# Patient Record
Sex: FEMALE | Race: WHITE | Employment: PART TIME | URBAN - NONMETROPOLITAN AREA
[De-identification: names, ages, dates, MRNs, and addresses within clinical notes are randomized per-mention and may not be internally consistent; named-entity substitution may affect disease eponyms.]

---

## 2022-09-20 ENCOUNTER — OFFICE VISIT (OUTPATIENT)
Dept: PODIATRY | Age: 62
End: 2022-09-20
Payer: COMMERCIAL

## 2022-09-20 VITALS
BODY MASS INDEX: 32.27 KG/M2 | HEART RATE: 62 BPM | HEIGHT: 64 IN | WEIGHT: 189 LBS | SYSTOLIC BLOOD PRESSURE: 122 MMHG | DIASTOLIC BLOOD PRESSURE: 80 MMHG

## 2022-09-20 DIAGNOSIS — L60.8 DEFORMITY OF NAIL BED: ICD-10-CM

## 2022-09-20 DIAGNOSIS — M79.671 FOOT PAIN, BILATERAL: ICD-10-CM

## 2022-09-20 DIAGNOSIS — L84 CORNS AND CALLOSITIES: Primary | ICD-10-CM

## 2022-09-20 DIAGNOSIS — M79.672 FOOT PAIN, BILATERAL: ICD-10-CM

## 2022-09-20 PROCEDURE — 99203 OFFICE O/P NEW LOW 30 MIN: CPT | Performed by: PODIATRIST

## 2022-09-20 PROCEDURE — 11055 PARING/CUTG B9 HYPRKER LES 1: CPT | Performed by: PODIATRIST

## 2022-09-20 RX ORDER — EZETIMIBE 10 MG/1
TABLET ORAL
COMMUNITY
Start: 2022-09-01

## 2022-09-20 RX ORDER — LOSARTAN POTASSIUM 50 MG/1
TABLET ORAL
COMMUNITY
Start: 2022-07-16

## 2022-09-20 RX ORDER — PRAVASTATIN SODIUM 80 MG/1
TABLET ORAL
COMMUNITY
Start: 2022-07-16

## 2022-09-20 RX ORDER — ESCITALOPRAM OXALATE 10 MG/1
TABLET ORAL
COMMUNITY
Start: 2019-11-12

## 2022-09-20 NOTE — PROGRESS NOTES
Subjective:  Morteza Tate is a 65 y.o. female who presents to the office today complaining of a painful callous on the Bilateral foot . Symptoms began  year(s) ago. Patient relates pain is Present. Pain is rated 4 out of 10 and is described as waxing and waning. Treatments prior to today's visit include: none recent. Pt also has changes to L big toe nail. Injured it awhile back and nail has always been thicker. Cut it back and felt a lot better. No other tx tried. .  Currently denies F/C/N/V. Allergies   Allergen Reactions    Amoxicillin Er Itching     Makes blood pressure drop       Past Medical History:   Diagnosis Date    Hyperlipidemia     Hypertension     Hypothyroidism        Prior to Admission medications    Medication Sig Start Date End Date Taking? Authorizing Provider   losartan (COZAAR) 50 MG tablet TAKE 1 TABLET BY MOUTH ONCE DAILY 7/16/22  Yes Historical Provider, MD   pravastatin (PRAVACHOL) 80 MG tablet TAKE 1 TABLET BY MOUTH ONCE DAILY 7/16/22  Yes Historical Provider, MD   ezetimibe (ZETIA) 10 MG tablet TAKE 1 TABLET BY MOUTH ONCE DAILY 9/1/22  Yes Historical Provider, MD   escitalopram (LEXAPRO) 10 MG tablet Take 1 tablet by mouth once daily 11/12/19  Yes Historical Provider, MD   ibuprofen (ADVIL;MOTRIN) 800 MG tablet Take 1 tablet by mouth every 8 hours as needed for Pain 6/29/16  Yes Jean-Paul Barbosa DO   docusate sodium (COLACE, DULCOLAX) 100 MG CAPS Take 100 mg by mouth 2 times daily 6/29/16  Yes Jean-Paul Barbosa DO   calcium carbonate (OSCAL) 500 MG TABS tablet Take 500 mg by mouth daily. Yes Historical Provider, MD   levothyroxine (SYNTHROID) 200 MCG tablet Take 200 mcg by mouth Daily. Yes Historical Provider, MD   Multiple Vitamin (MULTI-VITAMIN DAILY PO) Take  by mouth.    Yes Historical Provider, MD       Past Surgical History:   Procedure Laterality Date    DILATION AND CURETTAGE OF UTERUS      January, 1995 following miscarriage    HYSTEROSCOPY      ROBOTIC ASSISTED HYSTERECTOMY      THYROIDECTOMY      24years old       Family History   Problem Relation Age of Onset    Cancer Father         Esophageal    Diabetes Father     Glaucoma Neg Hx        Social History     Tobacco Use    Smoking status: Every Day     Packs/day: 1.00     Years: 35.00     Pack years: 35.00     Types: Cigarettes    Smokeless tobacco: Never    Tobacco comments:     LI Barrera RRT 6/28/16   Substance Use Topics    Alcohol use: No     Alcohol/week: 0.0 standard drinks       ROS: All 14 ROS systems reviewed and pertinent positives noted above, all others negative. Lower Extremity Physical Examination:     Vitals:   Vitals:    09/20/22 0952   BP: 122/80   Pulse: 62     General: AAO x 3 in NAD. Vascular: DP and PT pulses palpable 2/4, bilateral.  CFT <3 seconds, bilateral.  Hair growth present to the level of the digits, bilateral.  Edema absent, Bilateral.  Varicosities absent, bilateral. Erythema absent, bilateral. Distal Rubor absent bilateral.  Temperature within normal limits bilateral. Hyperpigmentation absent bilateral. Atrophic skin no. Neurological: Sensation intact to light touch to level of digits, bilateral.  Protective sensation intact 10/10 sites via 5.07/10g [place] Monofilament, bilateral.  negative Tinel's, bilateral.  negative Valleix sign, bilateral.  Vibratory intact bilateral.  Reflexes Decreased bilateral.  Paresthesias negative. Dysthesias negative. Sharp/dull intact bilateral.    Musculoskeletal: Muscle strength 5/5, bilateral.  Pain present upon palpation of the callous lesion Bilateral. within normal limits medial longitudinal arch, Bilateral.  Ankle ROM decreased,Bilateral.  1st MPJ ROM within normal limits, Bilateral.  Dorsally contracted digits absent digits none, Bilateral. Other foot deformities none. Integument: Warm, dry, supple, bilateral.  Hyperkeratotic tissue is present. Central core is present upon debridement. R side nly.   Callous is located sub 5th met head b/l. Arely Garrett Open lesion absent, Bilateral.  Interdigital maceration absent to web spaces , bilateral.  Nails left 1 and right none thickened, dystrophic and crumbly, discolored with subungual debris. Onychocryptosis R lateral hallux nail. Fissures absent, Bilateral.      Asessment: Patient is a 65 y.o. female with:    Diagnosis Orders   1. Corns and callosities        2. Foot pain, bilateral        3. Deformity of nail bed            Plan: Patient examined and evaluated. Current condition and treatment options discussed in detail. Advised pt about offloading techniques. Pt given option of prefabricated insoles with offloading pads. Paring of 2 benign hyperkeratotic lesions took place with #10 blade or tissue nippers. OTC treatments for a callous were discussed. Due to level of pain/deformity, it is in my medical opinion that patient will benefit from and is medically necessary for pre thom orthotics at this time. Pt was given the option of pre fabricated insoles. Pt was advised on appropriate use and how they can help their condition. Pt should use these in shoe gear 100% of the time WB. Urea 40% cream OTC   Patient examined and evaluated. Current condition and treatment options discussed in detail. Slant back nail cut took place of R lateral hallux nail. No need for nail procedure currently, any increase in pain of signs of infection then patient will be seen back for a nail procedure. Patient will soak qd in warm soapy water or epsom salts and will use OTC antibiotic ointment daily. Patient is low level medical decision making. Patient is acute uncomplicated condition. Patient is no new testing or prescriptions. Low risk morbidity current treatment course. Contact office with any questions/problems/concerns. RTC in 3month(s).

## 2023-03-06 SDOH — HEALTH STABILITY: PHYSICAL HEALTH: ON AVERAGE, HOW MANY MINUTES DO YOU ENGAGE IN EXERCISE AT THIS LEVEL?: 40 MIN

## 2023-03-06 SDOH — HEALTH STABILITY: PHYSICAL HEALTH: ON AVERAGE, HOW MANY DAYS PER WEEK DO YOU ENGAGE IN MODERATE TO STRENUOUS EXERCISE (LIKE A BRISK WALK)?: 7 DAYS

## 2023-03-07 ENCOUNTER — OFFICE VISIT (OUTPATIENT)
Dept: FAMILY MEDICINE CLINIC | Age: 63
End: 2023-03-07
Payer: COMMERCIAL

## 2023-03-07 VITALS
WEIGHT: 189 LBS | TEMPERATURE: 98.4 F | HEART RATE: 76 BPM | SYSTOLIC BLOOD PRESSURE: 136 MMHG | BODY MASS INDEX: 32.27 KG/M2 | DIASTOLIC BLOOD PRESSURE: 78 MMHG | OXYGEN SATURATION: 97 % | HEIGHT: 64 IN

## 2023-03-07 DIAGNOSIS — E03.9 ACQUIRED HYPOTHYROIDISM: Primary | ICD-10-CM

## 2023-03-07 DIAGNOSIS — Z12.31 ENCOUNTER FOR SCREENING MAMMOGRAM FOR BREAST CANCER: ICD-10-CM

## 2023-03-07 DIAGNOSIS — I10 ESSENTIAL HYPERTENSION: ICD-10-CM

## 2023-03-07 DIAGNOSIS — E78.5 HYPERLIPIDEMIA, UNSPECIFIED HYPERLIPIDEMIA TYPE: ICD-10-CM

## 2023-03-07 DIAGNOSIS — M25.561 LATERAL KNEE PAIN, RIGHT: ICD-10-CM

## 2023-03-07 DIAGNOSIS — Z13.1 SCREENING FOR DIABETES MELLITUS: ICD-10-CM

## 2023-03-07 DIAGNOSIS — M17.11 PRIMARY OSTEOARTHRITIS OF RIGHT KNEE: ICD-10-CM

## 2023-03-07 DIAGNOSIS — Z13.21 ENCOUNTER FOR VITAMIN DEFICIENCY SCREENING: ICD-10-CM

## 2023-03-07 PROBLEM — R19.5 POSITIVE COLORECTAL CANCER SCREENING USING COLOGUARD TEST: Status: ACTIVE | Noted: 2022-03-30

## 2023-03-07 PROBLEM — F41.9 ANXIETY: Status: ACTIVE | Noted: 2018-02-26

## 2023-03-07 PROBLEM — J44.9 CHRONIC OBSTRUCTIVE PULMONARY DISEASE (HCC): Status: ACTIVE | Noted: 2020-07-31

## 2023-03-07 PROCEDURE — 3078F DIAST BP <80 MM HG: CPT | Performed by: FAMILY MEDICINE

## 2023-03-07 PROCEDURE — 3017F COLORECTAL CA SCREEN DOC REV: CPT | Performed by: FAMILY MEDICINE

## 2023-03-07 PROCEDURE — 99214 OFFICE O/P EST MOD 30 MIN: CPT | Performed by: FAMILY MEDICINE

## 2023-03-07 PROCEDURE — G8427 DOCREV CUR MEDS BY ELIG CLIN: HCPCS | Performed by: FAMILY MEDICINE

## 2023-03-07 PROCEDURE — G8417 CALC BMI ABV UP PARAM F/U: HCPCS | Performed by: FAMILY MEDICINE

## 2023-03-07 PROCEDURE — G8484 FLU IMMUNIZE NO ADMIN: HCPCS | Performed by: FAMILY MEDICINE

## 2023-03-07 PROCEDURE — 4004F PT TOBACCO SCREEN RCVD TLK: CPT | Performed by: FAMILY MEDICINE

## 2023-03-07 PROCEDURE — 3074F SYST BP LT 130 MM HG: CPT | Performed by: FAMILY MEDICINE

## 2023-03-07 SDOH — ECONOMIC STABILITY: INCOME INSECURITY: HOW HARD IS IT FOR YOU TO PAY FOR THE VERY BASICS LIKE FOOD, HOUSING, MEDICAL CARE, AND HEATING?: NOT HARD AT ALL

## 2023-03-07 SDOH — ECONOMIC STABILITY: HOUSING INSECURITY
IN THE LAST 12 MONTHS, WAS THERE A TIME WHEN YOU DID NOT HAVE A STEADY PLACE TO SLEEP OR SLEPT IN A SHELTER (INCLUDING NOW)?: NO

## 2023-03-07 SDOH — ECONOMIC STABILITY: FOOD INSECURITY: WITHIN THE PAST 12 MONTHS, THE FOOD YOU BOUGHT JUST DIDN'T LAST AND YOU DIDN'T HAVE MONEY TO GET MORE.: NEVER TRUE

## 2023-03-07 SDOH — ECONOMIC STABILITY: FOOD INSECURITY: WITHIN THE PAST 12 MONTHS, YOU WORRIED THAT YOUR FOOD WOULD RUN OUT BEFORE YOU GOT MONEY TO BUY MORE.: NEVER TRUE

## 2023-03-07 ASSESSMENT — PATIENT HEALTH QUESTIONNAIRE - PHQ9
1. LITTLE INTEREST OR PLEASURE IN DOING THINGS: 0
SUM OF ALL RESPONSES TO PHQ9 QUESTIONS 1 & 2: 0
SUM OF ALL RESPONSES TO PHQ QUESTIONS 1-9: 0
2. FEELING DOWN, DEPRESSED OR HOPELESS: 0
SUM OF ALL RESPONSES TO PHQ QUESTIONS 1-9: 0

## 2023-03-07 ASSESSMENT — ENCOUNTER SYMPTOMS: BLOOD IN STOOL: 0

## 2023-03-07 NOTE — PROGRESS NOTES
[address]  [address], [address]  [phone]      Bekah Lozada is a 59 y.o. female who presents today for her medical conditions/complaints as noted below. Bekah Lozada is c/o of New Patient and Knee Pain (Right knee, felt it pop back in December 2022 while trying to sit on a toilet )      HPI:     Pt here today to establish - was most recently seeing Kelly Feng. Pt had a surgery with Dr. Brenda Larsen for mesh placement for pelvic organ prolapse in [id number]. She then had continued issues with pelvic organ prolapse and stress urinary incontinence, so she saw Dr. Michele Moctezuma and had hysterectomy with b/l oophorectomy in 9/2020. Does not have to follow with him anymore. Taking Synthroid 200 mcg daily for hypothyroidism - has been on the same dose of med since her total thyroidectomy at age 24 (due to abscess of thyroid). Last thyroid labs were 12/2021 - TSH 0.09. Taking Losartan 50 mg daily for HTN - stable. BP well-controlled today - 136/78. Checks at home infrequently. Taking Pravastatin 80 mg daily and Zetia 10 mg daily for HYPERLIPIDEMIA - stable. Last LP was almost at goal in 3/2022 after she added the Zetia. Tolerating meds well. Taking Lexapro 10 mg daily for mood - started this approx 4 years ago. Doesn't think that she needs to take it anymore, and would like to see about weaning off. Taking Calcium 500 mg daily with Vit D and MVI daily. Lives at home with boyfriend. Just retired from working at [employer].  + tobacco abuse - 1 ppd x past 45 years. Knows that she should quit, but unsure if she wants to right now. Has tried Chantix in the past, but it caused nausea. Has patches at home, but has not yet tried these. Denies recreational drug use or alcohol use. Pt is doing Weight Watchers online (has done this intermittently since 2004) - would like to get back down to 160 lbs.   Going to [employer] at least 5 days/week. Will be traveling to [facility] in May for 3 weeks. Past Medical History:   Diagnosis Date    Hyperlipidemia     Hypertension     Hypothyroidism     Obesity       Past Surgical History:   Procedure Laterality Date    DILATION AND CURETTAGE OF UTERUS      January, 1995 following miscarriage    PARTIAL HYSTERECTOMY (CERVIX NOT REMOVED)      THYROIDECTOMY      24years old - due to abscess     Family History   Problem Relation Age of Onset    Liver Disease Mother         was felt to be related to early gastic bypass surgery    Hypertension Mother     Cancer Father         Esophageal    Diabetes Father     Heart Attack Father         age 61    Heart Attack Brother         early 63's    Heart Attack Maternal Grandmother         due to blood clot - age 64    Glaucoma Neg Hx      Social History     Tobacco Use    Smoking status: Every Day     Packs/day: 1.00     Years: 35.00     Pack years: 35.00     Types: Cigarettes    Smokeless tobacco: Never    Tobacco comments:     LI Fuentes RRT 6/28/16   Substance Use Topics    Alcohol use: No      Current Outpatient Medications   Medication Sig Dispense Refill    losartan (COZAAR) 50 MG tablet TAKE 1 TABLET BY MOUTH ONCE DAILY      pravastatin (PRAVACHOL) 80 MG tablet TAKE 1 TABLET BY MOUTH ONCE DAILY      ezetimibe (ZETIA) 10 MG tablet TAKE 1 TABLET BY MOUTH ONCE DAILY      escitalopram (LEXAPRO) 10 MG tablet Take 1 tablet by mouth once daily      calcium carbonate (OSCAL) 500 MG TABS tablet Take 500 mg by mouth daily. levothyroxine (SYNTHROID) 200 MCG tablet Take 200 mcg by mouth Daily. Multiple Vitamin (MULTI-VITAMIN DAILY PO) Take  by mouth. No current facility-administered medications for this visit.      Allergies   Allergen Reactions    Amoxicillin Er Itching     Makes blood pressure drop       Health Maintenance   Topic Date Due    Pneumococcal 0-64 years Vaccine (1 - PCV) Never done    HIV screen  Never done    Hepatitis C screen  Never done DTaP/Tdap/Td vaccine (1 - Tdap) Never done    Cervical cancer screen  Never done    Diabetes screen  Never done    Breast cancer screen  Never done    Shingles vaccine (1 of 2) Never done    Low dose CT lung screening  Never done    COVID-19 Vaccine (4 - Booster for Pfizer series) 04/22/2022    Flu vaccine (1) Never done    Lipids  09/19/2023    Depression Screen  03/07/2024    Colorectal Cancer Screen  02/27/2025    Hepatitis A vaccine  Aged Out    Hib vaccine  Aged Out    Meningococcal (ACWY) vaccine  Aged Out       Subjective:      Review of Systems   Constitutional:  Negative for fever and unexpected weight change. Cardiovascular:  Negative for chest pain and palpitations. Gastrointestinal:  Negative for blood in stool. Genitourinary:  Negative for dysuria and hematuria. Musculoskeletal:  Positive for arthralgias (R knee - started 3 months ago when she went to sit on a lower toilet and it \"popped\" when she sat down; at first she could barely walk and had to use a walker. Has been getting better, but still has some pain over the outside of the knee. ). Negative for joint swelling. Neurological:  Negative for dizziness and light-headedness. Objective:     Vitals:    03/07/23 0921   BP: 136/78   Site: Right Upper Arm   Position: Sitting   Cuff Size: Large Adult   Pulse: 76   Temp: 98.4 °F (36.9 °C)   TempSrc: Temporal   SpO2: 97%   Weight: 189 lb (85.7 kg)   Height: 5' 4\" (1.626 m)     Physical Exam  Vitals and nursing note reviewed. Constitutional:       General: She is not in acute distress. Appearance: She is well-developed. HENT:      Head: Normocephalic and atraumatic. Right Ear: Tympanic membrane, ear canal and external ear normal.      Left Ear: Tympanic membrane, ear canal and external ear normal.      Nose: Nose normal.      Mouth/Throat:      Mouth: Mucous membranes are moist.      Pharynx: Oropharynx is clear. No posterior oropharyngeal erythema.    Eyes: Conjunctiva/sclera: Conjunctivae normal.   Cardiovascular:      Rate and Rhythm: Normal rate and regular rhythm. Heart sounds: Normal heart sounds. Pulmonary:      Effort: Pulmonary effort is normal. No respiratory distress. Breath sounds: Normal breath sounds. Abdominal:      General: Bowel sounds are normal. There is no distension. Palpations: Abdomen is soft. Tenderness: There is no abdominal tenderness. Musculoskeletal:      Cervical back: Neck supple. Right knee: Tenderness (lateral knee) present. Skin:     General: Skin is warm and dry. Neurological:      Mental Status: She is alert and oriented to person, place, and time. Assessment:      1. Acquired hypothyroidism  -     TSH with Reflex; Future  2. Essential hypertension  -     Comprehensive Metabolic Panel; Future  -     CBC with Auto Differential; Future  3. Hyperlipidemia, unspecified hyperlipidemia type  -     Comprehensive Metabolic Panel; Future  -     Lipid Panel; Future  4. Lateral knee pain, right  -     Bernardo Leslie MD, Orthopedic Surgery, [place]  5. Primary osteoarthritis of right knee  -     Bernardo Leslie MD, Orthopedic Surgery, [place]  6. Screening for diabetes mellitus  -     Hemoglobin A1C; Future  7. Encounter for vitamin deficiency screening  -     Vitamin D 25 Hydroxy; Future  8. Encounter for screening mammogram for breast cancer  -     DONALD BRITTNEE DIGITAL SCREEN BILATERAL; Future         Plan:      Pt will try to start weaning off her Lexapro by cutting down to 1/2 tab (5 mg) daily and then starting to take every other day, etc until she feels well enough to stop. Will report at next OV if she has stopped it completely. Declined flu vaccine, Shingrix, and Tdap booster. Declined lung CT for lung CA screening. Return in about 7 months (around 10/7/2023) for well woman exam with Pap.     Orders Placed This Encounter   Procedures    DONALD BRITTNEE DIGITAL SCREEN BILATERAL Standing Status:   Future     Standing Expiration Date:   5/7/2024     Order Specific Question:   Reason for exam:     Answer:   screening for breast cancer    Comprehensive Metabolic Panel     Standing Status:   Future     Standing Expiration Date:   3/7/2024    Hemoglobin A1C     Standing Status:   Future     Standing Expiration Date:   3/7/2024    Lipid Panel     Standing Status:   Future     Standing Expiration Date:   3/7/2024     Order Specific Question:   Is Patient Fasting?/# of Hours     Answer:   15     Order Specific Question:   Has the patient fasted? Answer:   Yes    Vitamin D 25 Hydroxy     Standing Status:   Future     Standing Expiration Date:   3/7/2024    TSH with Reflex     Standing Status:   Future     Standing Expiration Date:   3/7/2024    CBC with Auto Differential     Standing Status:   Future     Standing Expiration Date:   3/6/2024    Luís Friend MD, Orthopedic Surgery, [place]     Referral Priority:   Routine     Referral Type:   Eval and Treat     Referral Reason:   Specialty Services Required     Referred to Provider:   Corey Nino MD     Requested Specialty:   Orthopedic Surgery     Number of Visits Requested:   1         Patient given educational materials - see patient instructions. Discussed use, benefit, and side effects of prescribed medications. All patient questions answered. Pt voiced understanding. Reviewed health maintenance.             Electronically signed by [employer] BodyDO DO on 3/7/2023 at 10:27 AM

## 2023-03-22 ENCOUNTER — HOSPITAL ENCOUNTER (OUTPATIENT)
Age: 63
Discharge: HOME | End: 2023-03-22
Payer: COMMERCIAL

## 2023-03-22 DIAGNOSIS — Z13.1 SCREENING FOR DIABETES MELLITUS: ICD-10-CM

## 2023-03-22 DIAGNOSIS — E78.5 HYPERLIPIDEMIA, UNSPECIFIED HYPERLIPIDEMIA TYPE: ICD-10-CM

## 2023-03-22 DIAGNOSIS — I10 ESSENTIAL HYPERTENSION: ICD-10-CM

## 2023-03-22 DIAGNOSIS — E03.9 ACQUIRED HYPOTHYROIDISM: ICD-10-CM

## 2023-03-22 DIAGNOSIS — Z13.21 ENCOUNTER FOR VITAMIN DEFICIENCY SCREENING: ICD-10-CM

## 2023-03-22 LAB
25(OH)D3 SERPL-MCNC: 34.5 NG/ML
ABSOLUTE EOS #: 0.16 K/UL (ref 0–0.44)
ABSOLUTE IMMATURE GRANULOCYTE: <0.03 K/UL (ref 0–0.3)
ABSOLUTE LYMPH #: 2.11 K/UL (ref 1.1–3.7)
ABSOLUTE MONO #: 0.65 K/UL (ref 0.1–1.2)
ALBUMIN SERPL-MCNC: 4.6 G/DL (ref 3.5–5.2)
ALBUMIN/GLOBULIN RATIO: 1.3 (ref 1–2.5)
ALP SERPL-CCNC: 111 U/L (ref 35–104)
ALT SERPL-CCNC: 17 U/L (ref 5–33)
ANION GAP SERPL CALCULATED.3IONS-SCNC: 10 MMOL/L (ref 9–17)
AST SERPL-CCNC: 13 U/L
BASOPHILS # BLD: 1 % (ref 0–2)
BASOPHILS ABSOLUTE: 0.04 K/UL (ref 0–0.2)
BILIRUB SERPL-MCNC: 0.3 MG/DL (ref 0.3–1.2)
BUN SERPL-MCNC: 16 MG/DL (ref 8–23)
BUN/CREAT BLD: 28 (ref 9–20)
CALCIUM SERPL-MCNC: 9.8 MG/DL (ref 8.6–10.4)
CHLORIDE SERPL-SCNC: 104 MMOL/L (ref 98–107)
CHOLEST SERPL-MCNC: 172 MG/DL
CHOLESTEROL/HDL RATIO: 4.2
CO2 SERPL-SCNC: 28 MMOL/L (ref 20–31)
CREAT SERPL-MCNC: 0.57 MG/DL (ref 0.5–0.9)
EOSINOPHILS RELATIVE PERCENT: 3 % (ref 1–4)
EST. AVERAGE GLUCOSE BLD GHB EST-MCNC: 105 MG/DL
GFR SERPL CREATININE-BSD FRML MDRD: >60 ML/MIN/1.73M2
GLUCOSE SERPL-MCNC: 96 MG/DL (ref 70–99)
HBA1C MFR BLD: 5.3 % (ref 4–6)
HCT VFR BLD AUTO: 43.1 % (ref 36.3–47.1)
HDLC SERPL-MCNC: 41 MG/DL
HGB BLD-MCNC: 13.7 G/DL (ref 11.9–15.1)
IMMATURE GRANULOCYTES: 0 %
LDLC SERPL CALC-MCNC: 102 MG/DL (ref 0–130)
LYMPHOCYTES # BLD: 34 % (ref 24–43)
MCH RBC QN AUTO: 28.2 PG (ref 25.2–33.5)
MCHC RBC AUTO-ENTMCNC: 31.8 G/DL (ref 25.2–33.5)
MCV RBC AUTO: 88.9 FL (ref 82.6–102.9)
MONOCYTES # BLD: 11 % (ref 3–12)
NRBC AUTOMATED: 0 PER 100 WBC
PDW BLD-RTO: 12.3 % (ref 11.8–14.4)
PLATELET # BLD AUTO: 247 K/UL (ref 138–453)
PMV BLD AUTO: 10.3 FL (ref 8.1–13.5)
POTASSIUM SERPL-SCNC: 4.7 MMOL/L (ref 3.7–5.3)
PROT SERPL-MCNC: 8.1 G/DL (ref 6.4–8.3)
RBC # BLD: 4.85 M/UL (ref 3.95–5.11)
SEG NEUTROPHILS: 51 % (ref 36–65)
SEGMENTED NEUTROPHILS ABSOLUTE COUNT: 3.19 K/UL (ref 1.5–8.1)
SODIUM SERPL-SCNC: 142 MMOL/L (ref 135–144)
T4 FREE SERPL-MCNC: 1.7 NG/DL (ref 0.93–1.7)
TRIGL SERPL-MCNC: 145 MG/DL
TSH SERPL-ACNC: 0.07 UIU/ML (ref 0.3–5)
WBC # BLD AUTO: 6.2 K/UL (ref 3.5–11.3)

## 2023-03-22 PROCEDURE — 80053 COMPREHEN METABOLIC PANEL: CPT

## 2023-03-22 PROCEDURE — 82306 VITAMIN D 25 HYDROXY: CPT

## 2023-03-22 PROCEDURE — 83036 HEMOGLOBIN GLYCOSYLATED A1C: CPT

## 2023-03-22 PROCEDURE — 84443 ASSAY THYROID STIM HORMONE: CPT

## 2023-03-22 PROCEDURE — 85025 COMPLETE CBC W/AUTO DIFF WBC: CPT

## 2023-03-22 PROCEDURE — 80061 LIPID PANEL: CPT

## 2023-03-22 PROCEDURE — 84439 ASSAY OF FREE THYROXINE: CPT

## 2023-03-22 PROCEDURE — 36415 COLL VENOUS BLD VENIPUNCTURE: CPT

## 2023-04-26 DIAGNOSIS — E03.9 ACQUIRED HYPOTHYROIDISM: Primary | ICD-10-CM

## 2023-04-26 RX ORDER — LEVOTHYROXINE SODIUM 0.2 MG/1
TABLET ORAL
Qty: 90 TABLET | Refills: 0 | Status: SHIPPED | OUTPATIENT
Start: 2023-04-26

## 2023-04-26 NOTE — TELEPHONE ENCOUNTER
Pt's last TSH was too low at 0.07; she has been chronically low on other labwork in [address] [place] [address]. Please confirm that she is taking dose as directed; if so, will need to lower her dose. We will lower just slightly to 200 mcg daily, except 1/2 tab on Sat and Sunday's, and then re-check TSH again in 12 weeks. Will send new Rx now with updated dosing.

## 2023-04-26 NOTE — TELEPHONE ENCOUNTER
Arie Eason called requesting a refill of the below medication which has been pended for you:     Requested Prescriptions     Pending Prescriptions Disp Refills    levothyroxine (SYNTHROID) 200 MCG tablet 90 tablet 1     Sig: Take 1 tablet by mouth Daily       Last Appointment Date: 3/7/2023  Next Appointment Date: 10/9/2023    Allergies   Allergen Reactions    Amoxicillin Er Itching     Makes blood pressure drop

## 2023-04-26 NOTE — TELEPHONE ENCOUNTER
Patient only has 7 days worth of medication left. States she is leaving for [place] in 10 days and needs medication prior.

## 2023-06-22 DIAGNOSIS — E03.9 ACQUIRED HYPOTHYROIDISM: ICD-10-CM

## 2023-06-22 RX ORDER — LEVOTHYROXINE SODIUM 0.2 MG/1
TABLET ORAL
Qty: 90 TABLET | Refills: 0 | OUTPATIENT
Start: 2023-06-22

## 2023-07-12 ENCOUNTER — OFFICE VISIT (OUTPATIENT)
Dept: PRIMARY CARE CLINIC | Age: 63
End: 2023-07-12
Payer: COMMERCIAL

## 2023-07-12 VITALS
WEIGHT: 189.25 LBS | RESPIRATION RATE: 18 BRPM | OXYGEN SATURATION: 98 % | HEART RATE: 57 BPM | SYSTOLIC BLOOD PRESSURE: 128 MMHG | BODY MASS INDEX: 32.31 KG/M2 | DIASTOLIC BLOOD PRESSURE: 76 MMHG | HEIGHT: 64 IN | TEMPERATURE: 97.8 F

## 2023-07-12 DIAGNOSIS — B02.9 HERPES ZOSTER WITHOUT COMPLICATION: Primary | ICD-10-CM

## 2023-07-12 PROCEDURE — 3017F COLORECTAL CA SCREEN DOC REV: CPT | Performed by: NURSE PRACTITIONER

## 2023-07-12 PROCEDURE — G8427 DOCREV CUR MEDS BY ELIG CLIN: HCPCS | Performed by: NURSE PRACTITIONER

## 2023-07-12 PROCEDURE — 4004F PT TOBACCO SCREEN RCVD TLK: CPT | Performed by: NURSE PRACTITIONER

## 2023-07-12 PROCEDURE — 99213 OFFICE O/P EST LOW 20 MIN: CPT | Performed by: NURSE PRACTITIONER

## 2023-07-12 PROCEDURE — 3074F SYST BP LT 130 MM HG: CPT | Performed by: NURSE PRACTITIONER

## 2023-07-12 PROCEDURE — G8417 CALC BMI ABV UP PARAM F/U: HCPCS | Performed by: NURSE PRACTITIONER

## 2023-07-12 PROCEDURE — 3078F DIAST BP <80 MM HG: CPT | Performed by: NURSE PRACTITIONER

## 2023-07-12 RX ORDER — VALACYCLOVIR HYDROCHLORIDE 1 G/1
1000 TABLET, FILM COATED ORAL 3 TIMES DAILY
Qty: 21 TABLET | Refills: 0 | Status: SHIPPED | OUTPATIENT
Start: 2023-07-12 | End: 2023-07-19

## 2023-07-12 ASSESSMENT — ENCOUNTER SYMPTOMS
RESPIRATORY NEGATIVE: 1
SHORTNESS OF BREATH: 0
SORE THROAT: 0
COUGH: 0

## 2023-07-12 NOTE — PROGRESS NOTES
[facility]             [address], [address] [facility]                        Telephone [phone]             Fax [phone]  1960  W:[id number]   Date of visit:  7/12/2023    Subjective:    Shaq Thompson is a 59 y.o.  female who presents to [facility] today (7/12/2023) for evaluation of:    Chief Complaint   Patient presents with    Skin Problem     Patient believes she has shingles. Started a few days ago. Right side of abdomen. Itches, throbbing pains intermittently. Skin Problem  This is a new problem. The current episode started in the past 7 days (07/07/23). The affected locations include the abdomen and back. The rash is characterized by burning, itchiness and redness. Pertinent negatives include no congestion, cough, fever, shortness of breath or sore throat. Treatments tried: Caladryl. The treatment provided mild relief. She has the following problem list:  Patient Active Problem List   Diagnosis    Peroneus brevis tendonitis    Hyperlipidemia    Hypothyroidism    Cystocele    Rectocele    Anxiety    Chronic obstructive pulmonary disease (HCC)    Positive colorectal cancer screening using Cologuard test    Essential hypertension        Current medications are:  Current Outpatient Medications   Medication Sig Dispense Refill    valACYclovir (VALTREX) 1 g tablet Take 1 tablet by mouth 3 times daily for 7 days 21 tablet 0    levothyroxine (SYNTHROID) 200 MCG tablet Take 1 tab by mouth daily, except 1/2 tab on Sat/Sunday's.  90 tablet 0    losartan (COZAAR) 50 MG tablet TAKE 1 TABLET BY MOUTH ONCE DAILY      pravastatin (PRAVACHOL) 80 MG tablet TAKE 1 TABLET BY MOUTH ONCE DAILY      ezetimibe (ZETIA) 10 MG tablet TAKE 1 TABLET BY MOUTH ONCE DAILY      escitalopram (LEXAPRO) 10 MG tablet Take 1 tablet by mouth once daily      calcium carbonate (OSCAL) 500 MG TABS

## 2023-08-26 ENCOUNTER — OFFICE VISIT (OUTPATIENT)
Dept: PRIMARY CARE CLINIC | Age: 63
End: 2023-08-26
Payer: COMMERCIAL

## 2023-08-26 VITALS
HEART RATE: 62 BPM | SYSTOLIC BLOOD PRESSURE: 120 MMHG | RESPIRATION RATE: 16 BRPM | OXYGEN SATURATION: 98 % | HEIGHT: 64 IN | DIASTOLIC BLOOD PRESSURE: 72 MMHG | BODY MASS INDEX: 32.1 KG/M2 | TEMPERATURE: 98 F | WEIGHT: 188 LBS

## 2023-08-26 DIAGNOSIS — L23.5 ALLERGIC DERMATITIS DUE TO OTHER CHEMICAL PRODUCT: Primary | ICD-10-CM

## 2023-08-26 PROCEDURE — G8417 CALC BMI ABV UP PARAM F/U: HCPCS | Performed by: NURSE PRACTITIONER

## 2023-08-26 PROCEDURE — 3078F DIAST BP <80 MM HG: CPT | Performed by: NURSE PRACTITIONER

## 2023-08-26 PROCEDURE — 3074F SYST BP LT 130 MM HG: CPT | Performed by: NURSE PRACTITIONER

## 2023-08-26 PROCEDURE — 4004F PT TOBACCO SCREEN RCVD TLK: CPT | Performed by: NURSE PRACTITIONER

## 2023-08-26 PROCEDURE — G8427 DOCREV CUR MEDS BY ELIG CLIN: HCPCS | Performed by: NURSE PRACTITIONER

## 2023-08-26 PROCEDURE — 3017F COLORECTAL CA SCREEN DOC REV: CPT | Performed by: NURSE PRACTITIONER

## 2023-08-26 PROCEDURE — 99213 OFFICE O/P EST LOW 20 MIN: CPT | Performed by: NURSE PRACTITIONER

## 2023-08-26 ASSESSMENT — ENCOUNTER SYMPTOMS
ABDOMINAL PAIN: 0
COUGH: 0
VOMITING: 0
RHINORRHEA: 0
CHEST TIGHTNESS: 0
NAUSEA: 0
VOICE CHANGE: 0
SORE THROAT: 0
SINUS PRESSURE: 0
WHEEZING: 0
SHORTNESS OF BREATH: 0
DIARRHEA: 0

## 2023-08-26 NOTE — PROGRESS NOTES
[facility]             [address], [address] [facility]                        Telephone [phone]             Fax [phone]  1960  [facility]:[phone]   Date of visit:  8/26/2023    Subjective:    Jacquelin Figueroa is a 59 y.o.  female who presents to [facility] today (8/26/2023) for evaluation of:    Chief Complaint   Patient presents with    Rash     Pt c/o redness and rash around the corners of her mouth x 1 week. She recently used a new chapstick that has sunscreen in it. Pt states she is sensitive to sunscreens. Rash  This is a new problem. The current episode started in the past 7 days (X 1 week). The problem has been gradually worsening since onset. Location: began on corner of left corner of lips and now on both corners of lips. The rash is characterized by redness and itchiness (and \"tightness\"). Pertinent negatives include no congestion, cough, diarrhea, fatigue, fever, rhinorrhea, shortness of breath, sore throat or vomiting. (Began using a new chapstick with SPF about 10-14 days ago. Using it more often after rash began.) Treatments tried: neosporin last night. The treatment provided no relief. She has the following problem list:  Patient Active Problem List   Diagnosis    Peroneus brevis tendonitis    Hyperlipidemia    Hypothyroidism    Cystocele    Rectocele    Anxiety    Chronic obstructive pulmonary disease (HCC)    Positive colorectal cancer screening using Cologuard test    Essential hypertension        Current medications are:  Current Outpatient Medications   Medication Sig Dispense Refill    levothyroxine (SYNTHROID) 200 MCG tablet Take 1 tab by mouth daily, except 1/2 tab on Sat/Sunday's.  90 tablet 0    losartan (COZAAR) 50 MG tablet TAKE 1 TABLET BY MOUTH ONCE DAILY      pravastatin (PRAVACHOL) 80 MG tablet TAKE 1 TABLET BY MOUTH ONCE DAILY      ezetimibe vitamin D supplement

## 2023-09-25 DIAGNOSIS — E78.5 HYPERLIPIDEMIA, UNSPECIFIED HYPERLIPIDEMIA TYPE: Primary | ICD-10-CM

## 2023-09-25 DIAGNOSIS — F41.9 ANXIETY: ICD-10-CM

## 2023-09-25 NOTE — TELEPHONE ENCOUNTER
Michelle Molina called requesting a refill of the below medication which has been pended for you:     Requested Prescriptions     Pending Prescriptions Disp Refills    pravastatin (PRAVACHOL) 80 MG tablet 90 tablet 0     Sig: Take 1 tablet by mouth daily    escitalopram (LEXAPRO) 10 MG tablet 90 tablet 0     Sig: Take 1 tablet by mouth daily       Last Appointment Date: 3/7/2023  Next Appointment Date: 10/9/2023    Allergies   Allergen Reactions    Amoxicillin Er Itching     Makes blood pressure drop

## 2023-09-26 ENCOUNTER — HOSPITAL ENCOUNTER (OUTPATIENT)
Age: 63
Discharge: HOME | End: 2023-09-26
Payer: COMMERCIAL

## 2023-09-26 DIAGNOSIS — E03.9 ACQUIRED HYPOTHYROIDISM: ICD-10-CM

## 2023-09-26 LAB — TSH SERPL DL<=0.05 MIU/L-ACNC: 0.49 UIU/ML (ref 0.3–5)

## 2023-09-26 PROCEDURE — 36415 COLL VENOUS BLD VENIPUNCTURE: CPT

## 2023-09-26 PROCEDURE — 84443 ASSAY THYROID STIM HORMONE: CPT

## 2023-09-30 RX ORDER — PRAVASTATIN SODIUM 80 MG/1
80 TABLET ORAL DAILY
Qty: 90 TABLET | Refills: 1 | Status: SHIPPED | OUTPATIENT
Start: 2023-09-30

## 2023-09-30 RX ORDER — ESCITALOPRAM OXALATE 10 MG/1
10 TABLET ORAL DAILY
Qty: 90 TABLET | Refills: 1 | Status: SHIPPED | OUTPATIENT
Start: 2023-09-30

## 2023-10-09 ENCOUNTER — OFFICE VISIT (OUTPATIENT)
Dept: FAMILY MEDICINE CLINIC | Age: 63
End: 2023-10-09
Payer: COMMERCIAL

## 2023-10-09 ENCOUNTER — HOSPITAL ENCOUNTER (OUTPATIENT)
Age: 63
Setting detail: SPECIMEN
Discharge: HOME | End: 2023-10-09
Payer: COMMERCIAL

## 2023-10-09 ENCOUNTER — HOSPITAL ENCOUNTER (OUTPATIENT)
Dept: MAMMOGRAPHY | Age: 63
Discharge: HOME | End: 2023-10-11
Payer: COMMERCIAL

## 2023-10-09 VITALS
SYSTOLIC BLOOD PRESSURE: 150 MMHG | TEMPERATURE: 97.7 F | WEIGHT: 183.3 LBS | DIASTOLIC BLOOD PRESSURE: 58 MMHG | BODY MASS INDEX: 31.29 KG/M2 | HEIGHT: 64 IN | OXYGEN SATURATION: 97 % | HEART RATE: 66 BPM | RESPIRATION RATE: 16 BRPM

## 2023-10-09 DIAGNOSIS — E78.5 HYPERLIPIDEMIA, UNSPECIFIED HYPERLIPIDEMIA TYPE: ICD-10-CM

## 2023-10-09 DIAGNOSIS — Z12.4 CERVICAL CANCER SCREENING: ICD-10-CM

## 2023-10-09 DIAGNOSIS — E03.9 ACQUIRED HYPOTHYROIDISM: ICD-10-CM

## 2023-10-09 DIAGNOSIS — Z01.419 ENCOUNTER FOR WELL WOMAN EXAM WITH ROUTINE GYNECOLOGICAL EXAM: Primary | ICD-10-CM

## 2023-10-09 DIAGNOSIS — Z72.0 TOBACCO ABUSE: ICD-10-CM

## 2023-10-09 DIAGNOSIS — Z12.31 ENCOUNTER FOR SCREENING MAMMOGRAM FOR BREAST CANCER: ICD-10-CM

## 2023-10-09 PROCEDURE — 87624 HPV HI-RISK TYP POOLED RSLT: CPT

## 2023-10-09 PROCEDURE — G0145 SCR C/V CYTO,THINLAYER,RESCR: HCPCS

## 2023-10-09 PROCEDURE — G0123 SCREEN CERV/VAG THIN LAYER: HCPCS

## 2023-10-09 PROCEDURE — 77063 BREAST TOMOSYNTHESIS BI: CPT

## 2023-10-09 PROCEDURE — 3074F SYST BP LT 130 MM HG: CPT | Performed by: FAMILY MEDICINE

## 2023-10-09 PROCEDURE — 99406 BEHAV CHNG SMOKING 3-10 MIN: CPT | Performed by: FAMILY MEDICINE

## 2023-10-09 PROCEDURE — 3078F DIAST BP <80 MM HG: CPT | Performed by: FAMILY MEDICINE

## 2023-10-09 PROCEDURE — 99396 PREV VISIT EST AGE 40-64: CPT | Performed by: FAMILY MEDICINE

## 2023-10-09 PROCEDURE — G8484 FLU IMMUNIZE NO ADMIN: HCPCS | Performed by: FAMILY MEDICINE

## 2023-10-09 RX ORDER — SCOLOPAMINE TRANSDERMAL SYSTEM 1 MG/1
1 PATCH, EXTENDED RELEASE TRANSDERMAL
Qty: 10 PATCH | Refills: 1 | Status: SHIPPED | OUTPATIENT
Start: 2023-10-09

## 2023-10-09 RX ORDER — LEVOTHYROXINE SODIUM 0.2 MG/1
TABLET ORAL
Qty: 90 TABLET | Refills: 1 | Status: SHIPPED | OUTPATIENT
Start: 2023-10-09

## 2023-10-09 ASSESSMENT — ENCOUNTER SYMPTOMS: BLOOD IN STOOL: 0

## 2023-10-09 NOTE — PROGRESS NOTES
[address] [facility]  [address] [place] [address]  [phone]      Olga Maldonado is a 59 y.o. female who presents today for her medical conditions/complaints as noted below. Olga Maldonado is c/o of Gynecologic Exam      HPI:     Pt here today for well woman exam.    Last Pap smear  with Vivek Benson through [employer] [facility] - normal.  No h/o abnormal Pap smears. LMP around the age of 49 - went through menopause naturally; no bleeding/spotting since. Had D&C in  after miscarriage and then had supracervical hysterectomy (cervix remains) in  for pelvic organ prolapse.  - had one first trimester miscarriage and one blighted ovum. No h/o STD's. Currently sexually active, but not recently; has only one male partner. Denies dyspareunia or post-coital bleeding. Denies hot flashes (rare now) or vaginal dryness. No h/o hormone therapy after hysterectomy. Denies vaginal discharge, itching/burning, sores, or rashes. Denies family h/o ovarian, cervical, or endometrial CA. Denies breast lumps, pain, or skin changes. Last mammogram 23. Only one h/o abnormal mammogram, but f/u imaging was normal; no h/o biopsy. Sister diagnosed with breast CA around age 61. Denies family h/o colon CA. Pt has mammogram scheduled today at 11:30. Reviewed results with pt during OV today - TSH improved to 0.49. Will continue current dose of Synthroid 200 mcg daily, except 1/2 tab on Sat/'s. Will re-heck TSH again in 6 months. Had positive Cologuard on 22; had f/u colonoscopy in 2022, which showed only benign polyps. Was recommended to have another in 5 years. BP slightly elevated today - 158/60. Pt has not yet taken her Losartan 50 mg daily yet this AM.  Last BP at last OV was 136/78.         Past Medical History:   Diagnosis Date    Hyperlipidemia     Hypertension     Hypothyroidism     Obesity       Past Surgical History:   Procedure Laterality

## 2023-10-11 LAB
HPV I/H RISK 4 DNA CVX QL NAA+PROBE: NOT DETECTED
HPV SAMPLE: NORMAL
HPV, INTERPRETATION: NORMAL
HPV16 DNA CVX QL NAA+PROBE: NOT DETECTED
HPV18 DNA CVX QL NAA+PROBE: NOT DETECTED
SPECIMEN DESCRIPTION: NORMAL

## 2023-10-18 LAB — CYTOLOGY REPORT: NORMAL

## 2023-10-26 DIAGNOSIS — I10 ESSENTIAL HYPERTENSION: Primary | ICD-10-CM

## 2023-10-27 RX ORDER — LOSARTAN POTASSIUM 50 MG/1
50 TABLET ORAL DAILY
Qty: 90 TABLET | Refills: 1 | Status: SHIPPED | OUTPATIENT
Start: 2023-10-27

## 2023-10-27 NOTE — TELEPHONE ENCOUNTER
Aspen Ramirez called requesting a refill of the below medication which has been pended for you:     Requested Prescriptions     Pending Prescriptions Disp Refills    losartan (COZAAR) 50 MG tablet 90 tablet 0     Sig: Take 1 tablet by mouth daily       Last Appointment Date: 10/9/2023  Next Appointment Date: 4/17/2024    Allergies   Allergen Reactions    Amoxicillin Er Itching     Makes blood pressure drop

## 2023-11-03 DIAGNOSIS — Z12.31 SCREENING MAMMOGRAM, ENCOUNTER FOR: Primary | ICD-10-CM

## 2023-12-07 NOTE — TELEPHONE ENCOUNTER
Patient states she has not missed a dose and is away of the new dosing instructions. Lab order placed as well. Pt called and she wanted to confirm her infusion appt for Monday 12/11/23 and I did confirm that for her and she also wanted to make sure that she will be able to see doctor brandee on the day too while she is doing her infusion, because she stated that marcelino was going to coordinate that for her. I informed her that she was not on the schedule for the doctor that day and that he schedule was full and I told her that I will talk to the doctor and give her a call back. She also wanted to know if a fax came in for her for another doctor. I informed her that I will look into that for her too.

## 2023-12-14 ENCOUNTER — OFFICE VISIT (OUTPATIENT)
Dept: PRIMARY CARE CLINIC | Age: 63
End: 2023-12-14
Payer: COMMERCIAL

## 2023-12-14 ENCOUNTER — HOSPITAL ENCOUNTER (OUTPATIENT)
Dept: GENERAL RADIOLOGY | Age: 63
Discharge: HOME | End: 2023-12-16
Payer: COMMERCIAL

## 2023-12-14 VITALS
OXYGEN SATURATION: 97 % | DIASTOLIC BLOOD PRESSURE: 72 MMHG | WEIGHT: 189 LBS | RESPIRATION RATE: 16 BRPM | BODY MASS INDEX: 32.27 KG/M2 | HEIGHT: 64 IN | TEMPERATURE: 98.2 F | SYSTOLIC BLOOD PRESSURE: 146 MMHG | HEART RATE: 66 BPM

## 2023-12-14 DIAGNOSIS — M25.562 PAIN AND SWELLING OF LEFT KNEE: Primary | ICD-10-CM

## 2023-12-14 DIAGNOSIS — M25.462 PAIN AND SWELLING OF LEFT KNEE: ICD-10-CM

## 2023-12-14 DIAGNOSIS — M25.462 PAIN AND SWELLING OF LEFT KNEE: Primary | ICD-10-CM

## 2023-12-14 DIAGNOSIS — M25.562 PAIN AND SWELLING OF LEFT KNEE: ICD-10-CM

## 2023-12-14 PROCEDURE — 73562 X-RAY EXAM OF KNEE 3: CPT

## 2023-12-14 PROCEDURE — 99213 OFFICE O/P EST LOW 20 MIN: CPT | Performed by: NURSE PRACTITIONER

## 2023-12-14 PROCEDURE — 4004F PT TOBACCO SCREEN RCVD TLK: CPT | Performed by: NURSE PRACTITIONER

## 2023-12-14 PROCEDURE — 3078F DIAST BP <80 MM HG: CPT | Performed by: NURSE PRACTITIONER

## 2023-12-14 PROCEDURE — G8417 CALC BMI ABV UP PARAM F/U: HCPCS | Performed by: NURSE PRACTITIONER

## 2023-12-14 PROCEDURE — 3017F COLORECTAL CA SCREEN DOC REV: CPT | Performed by: NURSE PRACTITIONER

## 2023-12-14 PROCEDURE — 3077F SYST BP >= 140 MM HG: CPT | Performed by: NURSE PRACTITIONER

## 2023-12-14 PROCEDURE — G8427 DOCREV CUR MEDS BY ELIG CLIN: HCPCS | Performed by: NURSE PRACTITIONER

## 2023-12-14 PROCEDURE — G8484 FLU IMMUNIZE NO ADMIN: HCPCS | Performed by: NURSE PRACTITIONER

## 2024-01-09 ENCOUNTER — OFFICE VISIT (OUTPATIENT)
Dept: FAMILY MEDICINE CLINIC | Age: 64
End: 2024-01-09
Payer: COMMERCIAL

## 2024-01-09 VITALS
OXYGEN SATURATION: 96 % | DIASTOLIC BLOOD PRESSURE: 58 MMHG | WEIGHT: 190.3 LBS | BODY MASS INDEX: 32.49 KG/M2 | TEMPERATURE: 98 F | RESPIRATION RATE: 16 BRPM | SYSTOLIC BLOOD PRESSURE: 122 MMHG | HEIGHT: 64 IN | HEART RATE: 75 BPM

## 2024-01-09 DIAGNOSIS — M79.661 PAIN IN RIGHT LOWER LEG: Primary | ICD-10-CM

## 2024-01-09 PROCEDURE — G8484 FLU IMMUNIZE NO ADMIN: HCPCS | Performed by: FAMILY MEDICINE

## 2024-01-09 PROCEDURE — 99214 OFFICE O/P EST MOD 30 MIN: CPT | Performed by: FAMILY MEDICINE

## 2024-01-09 PROCEDURE — 3017F COLORECTAL CA SCREEN DOC REV: CPT | Performed by: FAMILY MEDICINE

## 2024-01-09 PROCEDURE — G8427 DOCREV CUR MEDS BY ELIG CLIN: HCPCS | Performed by: FAMILY MEDICINE

## 2024-01-09 PROCEDURE — 3078F DIAST BP <80 MM HG: CPT | Performed by: FAMILY MEDICINE

## 2024-01-09 PROCEDURE — G8417 CALC BMI ABV UP PARAM F/U: HCPCS | Performed by: FAMILY MEDICINE

## 2024-01-09 PROCEDURE — 3074F SYST BP LT 130 MM HG: CPT | Performed by: FAMILY MEDICINE

## 2024-01-09 PROCEDURE — 4004F PT TOBACCO SCREEN RCVD TLK: CPT | Performed by: FAMILY MEDICINE

## 2024-01-09 NOTE — PROGRESS NOTES
4/17/24.    Patient given educational materials - see patient instructions.  DAll patient questions answered.  Pt voiced understanding.          Electronically signed by Mary Schneider DO, DO on 1/22/2024 at 11:41 PM

## 2024-01-10 DIAGNOSIS — M79.661 PAIN IN RIGHT LOWER LEG: Primary | ICD-10-CM

## 2024-01-11 ENCOUNTER — HOSPITAL ENCOUNTER (OUTPATIENT)
Dept: INTERVENTIONAL RADIOLOGY/VASCULAR | Age: 64
Discharge: HOME | End: 2024-01-13
Attending: FAMILY MEDICINE
Payer: COMMERCIAL

## 2024-01-11 DIAGNOSIS — M79.661 PAIN IN RIGHT LOWER LEG: ICD-10-CM

## 2024-01-11 PROCEDURE — 93971 EXTREMITY STUDY: CPT

## 2024-01-26 RX ORDER — EZETIMIBE 10 MG/1
10 TABLET ORAL DAILY
Qty: 30 TABLET | OUTPATIENT
Start: 2024-01-26

## 2024-01-31 DIAGNOSIS — E78.5 HYPERLIPIDEMIA, UNSPECIFIED HYPERLIPIDEMIA TYPE: Primary | ICD-10-CM

## 2024-01-31 NOTE — TELEPHONE ENCOUNTER
Therese called requesting a refill of the below medication which has been pended for you:     Requested Prescriptions     Pending Prescriptions Disp Refills    ezetimibe (ZETIA) 10 MG tablet 30 tablet      Sig: Take 1 tablet by mouth daily       Last Appointment Date: 1/9/2024  Next Appointment Date: 4/17/2024    Allergies   Allergen Reactions    Amoxicillin Er Itching     Makes blood pressure drop

## 2024-02-01 ENCOUNTER — PATIENT MESSAGE (OUTPATIENT)
Dept: FAMILY MEDICINE CLINIC | Age: 64
End: 2024-02-01

## 2024-02-01 RX ORDER — EZETIMIBE 10 MG/1
10 TABLET ORAL DAILY
Qty: 90 TABLET | Refills: 0 | Status: SHIPPED | OUTPATIENT
Start: 2024-02-01

## 2024-02-02 NOTE — TELEPHONE ENCOUNTER
From: Therese Mckeon  To: Dr. Mary Schneider  Sent: 2024 10:06 AM EST  Subject: Ezetimibe    Hi. I need this prescription called in asap please. The one on file at [facility] shows refills but it  on . Thank you!!

## 2024-03-18 ENCOUNTER — HOSPITAL ENCOUNTER (OUTPATIENT)
Age: 64
Discharge: HOME | End: 2024-03-18
Payer: COMMERCIAL

## 2024-03-18 DIAGNOSIS — E78.5 HYPERLIPIDEMIA, UNSPECIFIED HYPERLIPIDEMIA TYPE: ICD-10-CM

## 2024-03-18 DIAGNOSIS — E03.9 ACQUIRED HYPOTHYROIDISM: ICD-10-CM

## 2024-03-18 LAB
ALBUMIN SERPL-MCNC: 4.5 G/DL (ref 3.5–5.2)
ALBUMIN/GLOB SERPL: 1.5 {RATIO} (ref 1–2.5)
ALP SERPL-CCNC: 104 U/L (ref 35–104)
ALT SERPL-CCNC: 18 U/L (ref 5–33)
ANION GAP SERPL CALCULATED.3IONS-SCNC: 13 MMOL/L (ref 9–17)
AST SERPL-CCNC: 15 U/L
BILIRUB SERPL-MCNC: 0.3 MG/DL (ref 0.3–1.2)
BUN SERPL-MCNC: 19 MG/DL (ref 8–23)
BUN/CREAT SERPL: 38 (ref 9–20)
CALCIUM SERPL-MCNC: 9.1 MG/DL (ref 8.6–10.4)
CHLORIDE SERPL-SCNC: 100 MMOL/L (ref 98–107)
CHOLEST SERPL-MCNC: 185 MG/DL (ref 0–199)
CHOLESTEROL/HDL RATIO: 4
CO2 SERPL-SCNC: 25 MMOL/L (ref 20–31)
CREAT SERPL-MCNC: 0.5 MG/DL (ref 0.5–0.9)
GFR SERPL CREATININE-BSD FRML MDRD: >60 ML/MIN/1.73M2
GLUCOSE SERPL-MCNC: 87 MG/DL (ref 70–99)
HDLC SERPL-MCNC: 47 MG/DL
LDLC SERPL CALC-MCNC: 105 MG/DL (ref 0–100)
POTASSIUM SERPL-SCNC: 4.3 MMOL/L (ref 3.7–5.3)
PROT SERPL-MCNC: 7.5 G/DL (ref 6.4–8.3)
SODIUM SERPL-SCNC: 138 MMOL/L (ref 135–144)
TRIGL SERPL-MCNC: 164 MG/DL
TSH SERPL DL<=0.05 MIU/L-ACNC: 3.18 UIU/ML (ref 0.3–5)
VLDLC SERPL CALC-MCNC: 33 MG/DL

## 2024-03-18 PROCEDURE — 36415 COLL VENOUS BLD VENIPUNCTURE: CPT

## 2024-03-18 PROCEDURE — 84443 ASSAY THYROID STIM HORMONE: CPT

## 2024-03-18 PROCEDURE — 80053 COMPREHEN METABOLIC PANEL: CPT

## 2024-03-18 PROCEDURE — 80061 LIPID PANEL: CPT

## 2024-03-22 DIAGNOSIS — E78.5 HYPERLIPIDEMIA, UNSPECIFIED HYPERLIPIDEMIA TYPE: ICD-10-CM

## 2024-03-22 DIAGNOSIS — F41.9 ANXIETY: ICD-10-CM

## 2024-03-25 NOTE — TELEPHONE ENCOUNTER
No refills remaining at pharmacy, confirmed with staff. Pt has enough for 3 weeks, OK to send when KB returns.     Therese called requesting a refill of the below medication which has been pended for you:     Requested Prescriptions     Pending Prescriptions Disp Refills    pravastatin (PRAVACHOL) 80 MG tablet [Pharmacy Med Name: Pravastatin Sodium 80 MG Oral Tablet] 90 tablet 0     Sig: Take 1 tablet by mouth once daily    escitalopram (LEXAPRO) 10 MG tablet [Pharmacy Med Name: Escitalopram Oxalate 10 MG Oral Tablet] 90 tablet 0     Sig: Take 1 tablet by mouth once daily       Last Appointment Date: 1/9/2024  Next Appointment Date: 4/17/2024    Allergies   Allergen Reactions    Amoxicillin Er Itching     Makes blood pressure drop

## 2024-03-27 RX ORDER — ESCITALOPRAM OXALATE 10 MG/1
10 TABLET ORAL DAILY
Qty: 90 TABLET | Refills: 3 | Status: SHIPPED | OUTPATIENT
Start: 2024-03-27

## 2024-03-27 RX ORDER — PRAVASTATIN SODIUM 80 MG/1
80 TABLET ORAL DAILY
Qty: 90 TABLET | Refills: 3 | Status: SHIPPED | OUTPATIENT
Start: 2024-03-27

## 2024-04-18 ENCOUNTER — OFFICE VISIT (OUTPATIENT)
Dept: PRIMARY CARE CLINIC | Age: 64
End: 2024-04-18
Payer: MEDICAID

## 2024-04-18 VITALS
RESPIRATION RATE: 16 BRPM | BODY MASS INDEX: 34.15 KG/M2 | SYSTOLIC BLOOD PRESSURE: 140 MMHG | HEART RATE: 79 BPM | OXYGEN SATURATION: 95 % | HEIGHT: 64 IN | WEIGHT: 200 LBS | DIASTOLIC BLOOD PRESSURE: 80 MMHG | TEMPERATURE: 98.1 F

## 2024-04-18 DIAGNOSIS — J40 BRONCHITIS: Primary | ICD-10-CM

## 2024-04-18 PROCEDURE — 99213 OFFICE O/P EST LOW 20 MIN: CPT

## 2024-04-18 PROCEDURE — 3079F DIAST BP 80-89 MM HG: CPT

## 2024-04-18 PROCEDURE — 3077F SYST BP >= 140 MM HG: CPT

## 2024-04-18 RX ORDER — BENZONATATE 100 MG/1
100 CAPSULE ORAL 3 TIMES DAILY PRN
Qty: 21 CAPSULE | Refills: 0 | Status: SHIPPED | OUTPATIENT
Start: 2024-04-18 | End: 2024-05-02

## 2024-04-18 RX ORDER — PREDNISONE 20 MG/1
20 TABLET ORAL 2 TIMES DAILY
Qty: 10 TABLET | Refills: 0 | Status: SHIPPED | OUTPATIENT
Start: 2024-04-18 | End: 2024-04-23

## 2024-04-18 RX ORDER — AZITHROMYCIN 250 MG/1
TABLET, FILM COATED ORAL
Qty: 6 TABLET | Refills: 0 | Status: SHIPPED | OUTPATIENT
Start: 2024-04-18 | End: 2024-04-28

## 2024-04-18 ASSESSMENT — PATIENT HEALTH QUESTIONNAIRE - PHQ9
2. FEELING DOWN, DEPRESSED OR HOPELESS: NOT AT ALL
SUM OF ALL RESPONSES TO PHQ QUESTIONS 1-9: 0
SUM OF ALL RESPONSES TO PHQ QUESTIONS 1-9: 0
1. LITTLE INTEREST OR PLEASURE IN DOING THINGS: NOT AT ALL
SUM OF ALL RESPONSES TO PHQ9 QUESTIONS 1 & 2: 0
SUM OF ALL RESPONSES TO PHQ QUESTIONS 1-9: 0
SUM OF ALL RESPONSES TO PHQ QUESTIONS 1-9: 0

## 2024-04-18 ASSESSMENT — ENCOUNTER SYMPTOMS
HOARSE VOICE: 1
RHINORRHEA: 1
DIARRHEA: 0
SORE THROAT: 0
COUGH: 1
SHORTNESS OF BREATH: 0
SINUS PRESSURE: 0
SINUS COMPLAINT: 1

## 2024-04-18 NOTE — PATIENT INSTRUCTIONS
Tessalon three times a day for cough  Azithromycin x 5 days  Prednisone x 5 days  Return or follow up with PCP for continued or worsening symptoms in 3-4 days  Go to ER for fever > 102.5 not reduced with tylenol/motrin, chest pain, shortness of breath

## 2024-04-18 NOTE — PROGRESS NOTES
[facility]  [address] [facility] [address]  Phone: [phone]  Fax: [phone]      Therese Mckeon is a 64 y.o. female who presents to the [facility] today for her medical conditions/complaints as noted below. Therese Mckeon is c/o of Sinus Problem (Sinus pressure, headache, and chest congestion. )          HPI:     Sinus Problem  This is a new problem. The current episode started 1 to 4 weeks ago (1.5 weeks). The problem has been gradually worsening since onset. There has been no fever. Her pain is at a severity of 0/10. She is experiencing no pain. Associated symptoms include congestion, coughing and a hoarse voice. Pertinent negatives include no shortness of breath, sinus pressure or sore throat. Treatments tried: OTC allergy medication, nyquil. The treatment provided mild relief.       Past Medical History:   Diagnosis Date    Hyperlipidemia     Hypertension     Hypothyroidism     Obesity         Allergies   Allergen Reactions    Amoxicillin Er Itching     Makes blood pressure drop       Wt Readings from Last 3 Encounters:   04/18/24 90.7 kg (200 lb)   01/09/24 86.3 kg (190 lb 4.8 oz)   12/19/23 84.8 kg (187 lb)     BP Readings from Last 3 Encounters:   04/18/24 (!) 140/80   01/09/24 (!) 122/58   12/19/23 (!) 160/78      Temp Readings from Last 3 Encounters:   04/18/24 98.1 °F (36.7 °C) (Tympanic)   01/09/24 98 °F (36.7 °C) (Temporal)   12/14/23 98.2 °F (36.8 °C) (Tympanic)     Pulse Readings from Last 3 Encounters:   04/18/24 79   01/09/24 75   12/19/23 71     SpO2 Readings from Last 3 Encounters:   04/18/24 95%   01/09/24 96%   12/19/23 99%       Subjective:      Review of Systems   Constitutional:  Negative for appetite change and fever.   HENT:  Positive for congestion, hoarse voice, postnasal drip and rhinorrhea. Negative for sinus pressure and sore throat.    Respiratory:  Positive for cough. Negative for

## 2024-04-27 DIAGNOSIS — Z13.21 ENCOUNTER FOR VITAMIN DEFICIENCY SCREENING: ICD-10-CM

## 2024-04-27 DIAGNOSIS — E03.9 ACQUIRED HYPOTHYROIDISM: ICD-10-CM

## 2024-04-27 DIAGNOSIS — E78.5 HYPERLIPIDEMIA, UNSPECIFIED HYPERLIPIDEMIA TYPE: ICD-10-CM

## 2024-04-27 DIAGNOSIS — I10 ESSENTIAL HYPERTENSION: Primary | ICD-10-CM

## 2024-04-29 NOTE — TELEPHONE ENCOUNTER
Therese called requesting a refill of the below medication which has been pended for you:     Requested Prescriptions     Pending Prescriptions Disp Refills    ezetimibe (ZETIA) 10 MG tablet [Pharmacy Med Name: Ezetimibe 10 MG Oral Tablet] 90 tablet 0     Sig: Take 1 tablet by mouth once daily       Last Appointment Date: 1/9/2024  Next Appointment Date: Visit date not found    Allergies   Allergen Reactions    Amoxicillin Er Itching     Makes blood pressure drop

## 2024-05-02 RX ORDER — EZETIMIBE 10 MG/1
10 TABLET ORAL DAILY
Qty: 90 TABLET | Refills: 3 | Status: SHIPPED | OUTPATIENT
Start: 2024-05-02

## 2024-05-02 NOTE — TELEPHONE ENCOUNTER
Please inform pt that her recent labs looked good - LP at goal, other than TG's slightly elevated at 164.  Recommend she improve diet to lower these.  Will continue Zetia 10 mg daily - refilled now. CMP normal, other than elevated BUN/Cr ratio at 38 - recommend she increase water intake to at least 64 oz daily.    TSH normal at 3.18 - will continue current Synthroid dosing of 200 mcg daily, except 1/2 tab on Sat/Sunday's.  Does she need refill of this now?    Pt cancelled recent appt on 4/17 - needs to reschedule appt please.

## 2024-05-05 DIAGNOSIS — I10 ESSENTIAL HYPERTENSION: ICD-10-CM

## 2024-05-06 NOTE — TELEPHONE ENCOUNTER
Therese called requesting a refill of the below medication which has been pended for you:     Requested Prescriptions     Pending Prescriptions Disp Refills    losartan (COZAAR) 50 MG tablet [Pharmacy Med Name: Losartan Potassium 50 MG Oral Tablet] 90 tablet 0     Sig: Take 1 tablet by mouth once daily       Last Appointment Date: 1/9/2024  Next Appointment Date: Visit date not found    Allergies   Allergen Reactions    Amoxicillin Er Itching     Makes blood pressure drop

## 2024-05-07 RX ORDER — LEVOTHYROXINE SODIUM 0.2 MG/1
TABLET ORAL
Qty: 90 TABLET | Refills: 1 | Status: SHIPPED | OUTPATIENT
Start: 2024-05-07

## 2024-05-07 NOTE — TELEPHONE ENCOUNTER
Pt updated, does need levothyroxine, was only given 30 day supply at last fill and has 0 refills left.  Will schedule per [employer] scheduling ticket.

## 2024-05-08 RX ORDER — LOSARTAN POTASSIUM 50 MG/1
50 TABLET ORAL DAILY
Qty: 90 TABLET | Refills: 0 | Status: SHIPPED | OUTPATIENT
Start: 2024-05-08

## 2024-08-02 DIAGNOSIS — I10 ESSENTIAL HYPERTENSION: ICD-10-CM

## 2024-08-02 NOTE — TELEPHONE ENCOUNTER
Sent message to schedule 6m f/u thyroid, meds, labs.     Therese called requesting a refill of the below medication which has been pended for you:     Requested Prescriptions     Pending Prescriptions Disp Refills    losartan (COZAAR) 50 MG tablet [Pharmacy Med Name: Losartan Potassium 50 MG Oral Tablet] 90 tablet 0     Sig: Take 1 tablet by mouth once daily       Last Appointment Date: 1/9/2024  Next Appointment Date: Visit date not found    Allergies   Allergen Reactions    Amoxicillin Er Itching     Makes blood pressure drop

## 2024-08-07 RX ORDER — LOSARTAN POTASSIUM 50 MG/1
50 TABLET ORAL DAILY
Qty: 90 TABLET | Refills: 0 | Status: SHIPPED | OUTPATIENT
Start: 2024-08-07

## 2024-09-24 ENCOUNTER — HOSPITAL ENCOUNTER (OUTPATIENT)
Age: 64
Discharge: HOME | End: 2024-09-24
Payer: MEDICAID

## 2024-09-24 DIAGNOSIS — E03.9 ACQUIRED HYPOTHYROIDISM: ICD-10-CM

## 2024-09-24 LAB
T4 FREE SERPL-MCNC: 1.1 NG/DL (ref 0.92–1.68)
TSH SERPL DL<=0.05 MIU/L-ACNC: 6.69 UIU/ML (ref 0.3–5)

## 2024-09-24 PROCEDURE — 84443 ASSAY THYROID STIM HORMONE: CPT

## 2024-09-24 PROCEDURE — 84439 ASSAY OF FREE THYROXINE: CPT

## 2024-09-24 PROCEDURE — 36415 COLL VENOUS BLD VENIPUNCTURE: CPT

## 2024-09-25 ENCOUNTER — OFFICE VISIT (OUTPATIENT)
Dept: FAMILY MEDICINE CLINIC | Age: 64
End: 2024-09-25
Payer: MEDICAID

## 2024-09-25 VITALS
OXYGEN SATURATION: 95 % | TEMPERATURE: 98 F | WEIGHT: 211.4 LBS | DIASTOLIC BLOOD PRESSURE: 62 MMHG | HEART RATE: 84 BPM | HEIGHT: 64 IN | RESPIRATION RATE: 16 BRPM | SYSTOLIC BLOOD PRESSURE: 144 MMHG | BODY MASS INDEX: 36.09 KG/M2

## 2024-09-25 DIAGNOSIS — E78.5 HYPERLIPIDEMIA, UNSPECIFIED HYPERLIPIDEMIA TYPE: ICD-10-CM

## 2024-09-25 DIAGNOSIS — F41.9 ANXIETY: ICD-10-CM

## 2024-09-25 DIAGNOSIS — Z23 NEED FOR PROPHYLACTIC VACCINATION AGAINST DIPHTHERIA-TETANUS-PERTUSSIS (DTP): ICD-10-CM

## 2024-09-25 DIAGNOSIS — E03.9 ACQUIRED HYPOTHYROIDISM: ICD-10-CM

## 2024-09-25 DIAGNOSIS — Z23 NEED FOR PROPHYLACTIC VACCINATION AND INOCULATION AGAINST VARICELLA: ICD-10-CM

## 2024-09-25 DIAGNOSIS — I10 ESSENTIAL HYPERTENSION: Primary | ICD-10-CM

## 2024-09-25 PROCEDURE — 99214 OFFICE O/P EST MOD 30 MIN: CPT | Performed by: FAMILY MEDICINE

## 2024-09-25 PROCEDURE — 90715 TDAP VACCINE 7 YRS/> IM: CPT | Performed by: FAMILY MEDICINE

## 2024-09-25 PROCEDURE — 3078F DIAST BP <80 MM HG: CPT | Performed by: FAMILY MEDICINE

## 2024-09-25 PROCEDURE — PBSHW TDAP, BOOSTRIX, (AGE 10 YRS+), IM: Performed by: FAMILY MEDICINE

## 2024-09-25 PROCEDURE — 3077F SYST BP >= 140 MM HG: CPT | Performed by: FAMILY MEDICINE

## 2024-09-25 RX ORDER — LOSARTAN POTASSIUM 50 MG/1
50 TABLET ORAL DAILY
Qty: 90 TABLET | Refills: 3 | Status: SHIPPED | OUTPATIENT
Start: 2024-09-25

## 2024-09-25 SDOH — ECONOMIC STABILITY: FOOD INSECURITY: WITHIN THE PAST 12 MONTHS, THE FOOD YOU BOUGHT JUST DIDN'T LAST AND YOU DIDN'T HAVE MONEY TO GET MORE.: NEVER TRUE

## 2024-09-25 SDOH — ECONOMIC STABILITY: INCOME INSECURITY: HOW HARD IS IT FOR YOU TO PAY FOR THE VERY BASICS LIKE FOOD, HOUSING, MEDICAL CARE, AND HEATING?: NOT HARD AT ALL

## 2024-09-25 SDOH — ECONOMIC STABILITY: FOOD INSECURITY: WITHIN THE PAST 12 MONTHS, YOU WORRIED THAT YOUR FOOD WOULD RUN OUT BEFORE YOU GOT MONEY TO BUY MORE.: NEVER TRUE

## 2024-09-25 NOTE — PROGRESS NOTES
[facility]  [address] [id number]  [phone]      Therese Mckeon is a 64 y.o. female who presents today for her medical conditions/complaints as noted below.  Therese Mckeon is c/o of Hypothyroidism and Tingling (Bilat lower legs/feet)      HPI:     Pt here today for follow-up of meds and leg aches.    Taking Synthroid 200 mcg six days/week and holds one day.  TSH elevated to 6.69.    Taking Losartan 50 mg daily for HTN - stable.  BP slightly elevated today - 162/72, but pt has not yet taken her AM dose today.      Taking Zetia 10 mg and Pravastatin 80 mg daily for HYPERLIPIDEMIA - stable.    Taking Lexapro 10 mg daily for anxiety - stable on this dose.      Pt has \"twinges\" of pain in her legs; also having more numbness/tingling in her feet intermittently, R>L.  Usually only happens when she is standing.  Leg pain happens more at night, usually when she is trying to fall asleep.  Almost feels like creepy crawly sensation.  Doesn't seem to matter how active she is.      Weight was down to 150 lbs after joining [employer] in 2004; then gained approx 20 lbs in menopause about 10 years ago.  She has then gradually gained weight in the past few years, and hasn't been able to get it back off.  Will go back to her [employer] diet.  Knows she needs to get back to [employer] to work out.    Scheduled for mammogram on 10/14.    Taking Calcium once daily and MVI daily.        Past Medical History:   Diagnosis Date    Hyperlipidemia     Hypertension     Hypothyroidism     Obesity       Past Surgical History:   Procedure Laterality Date    DILATION AND CURETTAGE OF UTERUS      January, 1995 following miscarriage    PARTIAL HYSTERECTOMY (CERVIX NOT REMOVED)  09/25/2020    [facility] - Dr. Kimball    THYROIDECTOMY      20 years old - due to abscess     Family History   Problem Relation Age of Onset    Liver Disease Mother         was felt to be

## 2024-10-14 ENCOUNTER — HOSPITAL ENCOUNTER (OUTPATIENT)
Dept: MAMMOGRAPHY | Age: 64
Discharge: HOME | End: 2024-10-16
Attending: FAMILY MEDICINE
Payer: MEDICAID

## 2024-10-14 VITALS — WEIGHT: 205 LBS | BODY MASS INDEX: 35 KG/M2 | HEIGHT: 64 IN

## 2024-10-14 DIAGNOSIS — Z12.31 SCREENING MAMMOGRAM, ENCOUNTER FOR: ICD-10-CM

## 2024-10-14 PROCEDURE — 77063 BREAST TOMOSYNTHESIS BI: CPT

## 2024-10-29 ENCOUNTER — HOSPITAL ENCOUNTER (OUTPATIENT)
Age: 64
Discharge: HOME | End: 2024-10-29
Payer: MEDICAID

## 2024-10-29 ENCOUNTER — OFFICE VISIT (OUTPATIENT)
Dept: PRIMARY CARE CLINIC | Age: 64
End: 2024-10-29
Payer: MEDICAID

## 2024-10-29 ENCOUNTER — HOSPITAL ENCOUNTER (OUTPATIENT)
Dept: INTERVENTIONAL RADIOLOGY/VASCULAR | Age: 64
Discharge: HOME | End: 2024-10-31
Payer: MEDICAID

## 2024-10-29 VITALS
BODY MASS INDEX: 35.87 KG/M2 | HEART RATE: 74 BPM | DIASTOLIC BLOOD PRESSURE: 78 MMHG | SYSTOLIC BLOOD PRESSURE: 118 MMHG | TEMPERATURE: 98 F | WEIGHT: 209 LBS | OXYGEN SATURATION: 98 %

## 2024-10-29 DIAGNOSIS — M79.604 BILATERAL LEG PAIN: Primary | ICD-10-CM

## 2024-10-29 DIAGNOSIS — M79.605 BILATERAL LEG PAIN: ICD-10-CM

## 2024-10-29 DIAGNOSIS — M79.604 BILATERAL LEG PAIN: ICD-10-CM

## 2024-10-29 DIAGNOSIS — M79.605 BILATERAL LEG PAIN: Primary | ICD-10-CM

## 2024-10-29 LAB
ALBUMIN SERPL-MCNC: 4.9 G/DL (ref 3.5–5.2)
ALBUMIN/GLOB SERPL: 1.3 {RATIO} (ref 1–2.5)
ALP SERPL-CCNC: 118 U/L (ref 35–104)
ALT SERPL-CCNC: 20 U/L (ref 5–33)
ANION GAP SERPL CALCULATED.3IONS-SCNC: 10 MMOL/L (ref 9–17)
AST SERPL-CCNC: 17 U/L
BASOPHILS # BLD: 0.06 K/UL (ref 0–0.2)
BASOPHILS NFR BLD: 1 % (ref 0–2)
BILIRUB SERPL-MCNC: 0.3 MG/DL (ref 0.3–1.2)
BUN SERPL-MCNC: 10 MG/DL (ref 8–23)
BUN/CREAT SERPL: 17 (ref 9–20)
CALCIUM SERPL-MCNC: 10 MG/DL (ref 8.6–10.4)
CHLORIDE SERPL-SCNC: 100 MMOL/L (ref 98–107)
CK SERPL-CCNC: 48 U/L (ref 26–192)
CO2 SERPL-SCNC: 28 MMOL/L (ref 20–31)
CREAT SERPL-MCNC: 0.6 MG/DL (ref 0.5–0.9)
EOSINOPHIL # BLD: 0.11 K/UL (ref 0–0.44)
EOSINOPHILS RELATIVE PERCENT: 1 % (ref 1–4)
ERYTHROCYTE [DISTWIDTH] IN BLOOD BY AUTOMATED COUNT: 13.2 % (ref 11.8–14.4)
ERYTHROCYTE [SEDIMENTATION RATE] IN BLOOD BY PHOTOMETRIC METHOD: 18 MM/HR (ref 0–30)
GFR, ESTIMATED: >90 ML/MIN/1.73M2
GLUCOSE SERPL-MCNC: 101 MG/DL (ref 70–99)
HCT VFR BLD AUTO: 43.8 % (ref 36.3–47.1)
HGB BLD-MCNC: 14.4 G/DL (ref 11.9–15.1)
IMM GRANULOCYTES # BLD AUTO: 0.03 K/UL (ref 0–0.3)
IMM GRANULOCYTES NFR BLD: 0 %
LYMPHOCYTES NFR BLD: 1.97 K/UL (ref 1.1–3.7)
LYMPHOCYTES RELATIVE PERCENT: 25 % (ref 24–43)
MCH RBC QN AUTO: 29 PG (ref 25.2–33.5)
MCHC RBC AUTO-ENTMCNC: 32.9 G/DL (ref 25.2–33.5)
MCV RBC AUTO: 88.3 FL (ref 82.6–102.9)
MONOCYTES NFR BLD: 0.45 K/UL (ref 0.1–1.2)
MONOCYTES NFR BLD: 6 % (ref 3–12)
NEUTROPHILS NFR BLD: 67 % (ref 36–65)
NEUTS SEG NFR BLD: 5.28 K/UL (ref 1.5–8.1)
NRBC BLD-RTO: 0 PER 100 WBC
PLATELET # BLD AUTO: 299 K/UL (ref 138–453)
PMV BLD AUTO: 9.7 FL (ref 8.1–13.5)
POTASSIUM SERPL-SCNC: 4.8 MMOL/L (ref 3.7–5.3)
PROT SERPL-MCNC: 8.8 G/DL (ref 6.4–8.3)
RBC # BLD AUTO: 4.96 M/UL (ref 3.95–5.11)
SODIUM SERPL-SCNC: 138 MMOL/L (ref 135–144)
WBC OTHER # BLD: 7.9 K/UL (ref 3.5–11.3)

## 2024-10-29 PROCEDURE — 85652 RBC SED RATE AUTOMATED: CPT

## 2024-10-29 PROCEDURE — 3074F SYST BP LT 130 MM HG: CPT | Performed by: FAMILY MEDICINE

## 2024-10-29 PROCEDURE — 99212 OFFICE O/P EST SF 10 MIN: CPT | Performed by: FAMILY MEDICINE

## 2024-10-29 PROCEDURE — 3078F DIAST BP <80 MM HG: CPT | Performed by: FAMILY MEDICINE

## 2024-10-29 PROCEDURE — 82550 ASSAY OF CK (CPK): CPT

## 2024-10-29 PROCEDURE — 80053 COMPREHEN METABOLIC PANEL: CPT

## 2024-10-29 PROCEDURE — 99214 OFFICE O/P EST MOD 30 MIN: CPT | Performed by: FAMILY MEDICINE

## 2024-10-29 PROCEDURE — 36415 COLL VENOUS BLD VENIPUNCTURE: CPT

## 2024-10-29 PROCEDURE — 85025 COMPLETE CBC W/AUTO DIFF WBC: CPT

## 2024-10-29 PROCEDURE — 93970 EXTREMITY STUDY: CPT

## 2024-10-29 RX ORDER — METHYLPREDNISOLONE 4 MG/1
TABLET ORAL
Qty: 1 KIT | Refills: 0 | Status: SHIPPED | OUTPATIENT
Start: 2024-10-29 | End: 2024-11-04

## 2024-10-29 RX ORDER — TRAMADOL HYDROCHLORIDE 50 MG/1
50 TABLET ORAL EVERY 6 HOURS PRN
Qty: 12 TABLET | Refills: 0 | Status: SHIPPED | OUTPATIENT
Start: 2024-10-29 | End: 2024-11-01

## 2024-10-29 ASSESSMENT — PATIENT HEALTH QUESTIONNAIRE - PHQ9
SUM OF ALL RESPONSES TO PHQ QUESTIONS 1-9: 0
2. FEELING DOWN, DEPRESSED OR HOPELESS: NOT AT ALL
SUM OF ALL RESPONSES TO PHQ9 QUESTIONS 1 & 2: 0
SUM OF ALL RESPONSES TO PHQ QUESTIONS 1-9: 0
1. LITTLE INTEREST OR PLEASURE IN DOING THINGS: NOT AT ALL

## 2024-10-29 NOTE — PROGRESS NOTES
Medicare Wellness Visit  Plan for Preventive Care    A good way for you to stay healthy is to use preventive care.  Medicare covers many services that can help you stay healthy.* The goal of these services is to find any health problems as quickly as possible. Finding problems early can help make them easier to treat.  Your personal plan below lists the services you may need and when they are due.     Health Maintenance Summary     Shingles Vaccine (1 of 2)  Overdue - never done    COVID-19 Vaccine (2 - Moderna 3-dose series)  Overdue since 4/8/2021    DTaP/Tdap/Td Vaccine (1 - Tdap)  Overdue since 6/2/2021    DM/CKD Microalbumin (Yearly)  Next due on 11/1/2022    DM/CKD GFR (Yearly)  Next due on 11/1/2022    Medicare Wellness Visit (Yearly)  Next due on 11/1/2022    Depression Screening (Yearly)  Next due on 11/1/2022    Pneumococcal Vaccine 65+   Completed    Influenza Vaccine   Completed    Hepatitis B Vaccine   Aged Out    Meningococcal Vaccine   Aged Out    HPV Vaccine   Aged Out           Preventive Care for Women and Men    Heart Screenings (Cardiovascular):  · Blood tests are used to check your cholesterol, lipid and triglyceride levels. High levels can increase your risk for heart disease and stroke. High levels can be treated with medications, diet and exercise. Lowering your levels can help keep your heart and blood vessels healthy.  Your provider will order these tests if they are needed.    · An ultrasound is done to see if you have an abdominal aortic aneurysm (AAA).  This is an enlargement of one of the main blood vessels that delivers blood to the body.   In the [place], 9,000 deaths are caused by AAA.  You may not even know you have this problem and as many as 1 in 3 people will have a serious problem if it is not treated.  Early diagnosis allows for more effective treatment and cure.  If you have a family history of AAA or are a male age 65-75 who has smoked, you are at higher risk of an 
Tanisha with vascular calls and reports prelim results of BLE doppler are negative for DVT.   
AAA.  Your provider can order this test, if needed.    Colorectal Screening:  · There are many tests that are used to check for cancer of your colon and rectum. You and your provider should discuss what test is best for you and when to have it done.  Options include:  · Screening Colonoscopy: exam of the entire colon, seen through a flexible lighted tube.  · Flexible Sigmoidoscopy: exam of the last third (sigmoid portion) of the colon and rectum, seen through a flexible lighted tube.  · Cologuard DNA stool test: a sample of your stool is used to screen for cancer and unseen blood in your stool.  · Fecal Occult Blood Test: a sample of your stool is studied to find any unseen blood    Flu Shot:  · An immunization that helps to prevent influenza (the flu). You should get this every year. The best time to get the shot is in the fall.    Pneumococcal Shot:  • Vaccines are available that can help prevent pneumococcal disease, which is any type of infection caused by Streptococcus pneumoniae bacteria.   Their use can prevent some cases of pneumonia, meningitis, and sepsis. There are two types of pneumococcal vaccines:   o Conjugate vaccines (PCV-13 or Prevnar 13®) - helps protect against the 13 types of pneumococcal bacteria that are the most common causes of serious infections in children and adults.    o Polysaccharide vaccine (PPSV23 or Nkuorvxyb42®) - helps protect against 23 types of pneumococcal bacteria for patients who are recommended to get it.  These vaccines should be given at least 12 months apart.  A booster is usually not needed.     Hepatitis B Shot:  · An immunization that helps to protect people from getting Hepatitis B. Hepatitis B is a virus that spreads through contact with infected blood or body fluids. Many people with the virus do not have symptoms.  The virus can lead to serious problems, such as liver disease. Some people are at higher risk than others. Your doctor will tell you if you need this 
shot.     Diabetes Screening:  · A test to measure sugar (glucose) in your blood is called a fasting blood sugar. Fasting means you cannot have food or drink for at least 8 hours before the test. This test can detect diabetes long before you may notice symptoms.    Glaucoma Screening:  · Glaucoma screening is performed by your eye doctor. The test measures the fluid pressure inside your eyes to determine if you have glaucoma.     Hepatitis C Screening:  · A blood test to see if you have the hepatitis C virus.  Hepatitis C attacks the liver and is a major cause of chronic liver disease.  Medicare will cover a single screening for all adults born between 1945 & 1965, or high risk patients (people who have injected illegal drugs or people who have had blood transfusions).  High risk patients who continue to inject illegal drugs can be screened for Hepatitis C every year.    Smoking and Tobacco-Use Cessation Counseling:  · Tobacco is the single greatest cause of disease and early death in our country today. Medication and counseling together can increase a person’s chance of quitting for good.   · Medicare covers two quitting attempts per year, with four counseling sessions per attempt (eight sessions in a 12 month period)    Preventive Screening tests for Women    Screening Mammograms and Breast Exams:  · An x-ray of your breasts to check for breast cancer before you or your doctor may be able to feel it.  If breast cancer is found early it can usually be treated with success.    Pelvic Exams and Pap Tests:  · An exam to check for cervical and vaginal cancer. A Pap test is a lab test in which cells are taken from your cervix and sent to the lab to look for signs of cervical cancer. If cancer of the cervix is found early, chances for a cure are good. Testing can generally end at age 65, or if a woman has a hysterectomy for a benign condition. Your provider may recommend more frequent testing if certain abnormal results 
are found.    Bone Mass Measurements:  · A painless x-ray of your bone density to see if you are at risk for a broken bone. Bone density refers to the thickness of bones or how tightly the bone tissue is packed.    Preventive Screening tests for Men    Prostate Screening:  · Should you have a prostate cancer test (PSA)?  It is up to you to decide if you want a prostate cancer test. Talk to your clinician to find out if the test is right for you.  Things for you to consider and talk about should include:  · Benefits and harms of the test  · Your family history  · How your race/ethnicity may influence the test  · If the test may impact other medical conditions you have  · Your values on screenings and treatments    *Medicare pays for many preventive services to keep you healthy. For some of these services, you might have to pay a deductible, coinsurance, and / or copayment.  The amounts vary depending on the type of services you need and the kind of Medicare health plan you have.    For further details on screenings offered by Medicare please visit: https://www.medicare.gov/coverage/preventive-screening-services   
Platelets 10/29/2024 299  138 - 453 k/uL Final    MPV 10/29/2024 9.7  8.1 - 13.5 fL Final    NRBC Automated 10/29/2024 0.0  0.0 per 100 WBC Final    Neutrophils % 10/29/2024 67 (H)  36 - 65 % Final    Lymphocytes % 10/29/2024 25  24 - 43 % Final    Monocytes % 10/29/2024 6  3 - 12 % Final    Eosinophils % 10/29/2024 1  1 - 4 % Final    Basophils % 10/29/2024 1  0 - 2 % Final    Immature Granulocytes % 10/29/2024 0  0 % Final    Neutrophils Absolute 10/29/2024 5.28  1.50 - 8.10 k/uL Final    Lymphocytes Absolute 10/29/2024 1.97  1.10 - 3.70 k/uL Final    Monocytes Absolute 10/29/2024 0.45  0.10 - 1.20 k/uL Final    Eosinophils Absolute 10/29/2024 0.11  0.00 - 0.44 k/uL Final    Basophils Absolute 10/29/2024 0.06  0.00 - 0.20 k/uL Final    Immature Granulocytes Absolute 10/29/2024 0.03  0.00 - 0.30 k/uL Final       The bilateral lower extremity Dopplers were reported as negative for DVT.           The patient (or guardian, if applicable) and other individuals in attendance with the patient were advised that Artificial Intelligence will be utilized during this visit to record, process the conversation to generate a clinical note, and support improvement of the AI technology. The patient (or guardian, if applicable) and other individuals in attendance at the appointment consented to the use of AI, including the recording.                     (Please note that portions of this note were completed with a voice-recognition program. Efforts were made to edit the dictation but occasionally words are mis-transcribed.)

## 2024-12-09 ENCOUNTER — HOSPITAL ENCOUNTER (OUTPATIENT)
Dept: GENERAL RADIOLOGY | Age: 64
Discharge: HOME | End: 2024-12-11
Payer: MEDICAID

## 2024-12-09 ENCOUNTER — OFFICE VISIT (OUTPATIENT)
Dept: FAMILY MEDICINE CLINIC | Age: 64
End: 2024-12-09
Payer: MEDICAID

## 2024-12-09 VITALS
WEIGHT: 207.6 LBS | DIASTOLIC BLOOD PRESSURE: 62 MMHG | SYSTOLIC BLOOD PRESSURE: 138 MMHG | OXYGEN SATURATION: 98 % | BODY MASS INDEX: 35.44 KG/M2 | TEMPERATURE: 98.4 F | HEIGHT: 64 IN | RESPIRATION RATE: 16 BRPM | HEART RATE: 75 BPM

## 2024-12-09 DIAGNOSIS — M79.604 BILATERAL LEG PAIN: Primary | ICD-10-CM

## 2024-12-09 DIAGNOSIS — M54.50 BILATERAL LOW BACK PAIN, UNSPECIFIED CHRONICITY, UNSPECIFIED WHETHER SCIATICA PRESENT: ICD-10-CM

## 2024-12-09 DIAGNOSIS — M79.605 BILATERAL LEG PAIN: Primary | ICD-10-CM

## 2024-12-09 DIAGNOSIS — R20.2 TINGLING IN EXTREMITIES: ICD-10-CM

## 2024-12-09 PROCEDURE — 3075F SYST BP GE 130 - 139MM HG: CPT | Performed by: FAMILY MEDICINE

## 2024-12-09 PROCEDURE — 3078F DIAST BP <80 MM HG: CPT | Performed by: FAMILY MEDICINE

## 2024-12-09 PROCEDURE — 99214 OFFICE O/P EST MOD 30 MIN: CPT

## 2024-12-09 PROCEDURE — 99214 OFFICE O/P EST MOD 30 MIN: CPT | Performed by: FAMILY MEDICINE

## 2024-12-09 PROCEDURE — 72100 X-RAY EXAM L-S SPINE 2/3 VWS: CPT

## 2024-12-09 RX ORDER — METHYLPREDNISOLONE 4 MG/1
TABLET ORAL
Qty: 1 KIT | Refills: 0 | Status: SHIPPED | OUTPATIENT
Start: 2024-12-09 | End: 2024-12-15

## 2024-12-09 NOTE — PROGRESS NOTES
[facility]  [address] [place] [address]  [phone]      Therese Mckeon is a 64 y.o. female who presents today for her medical conditions/complaints as noted below.  Therese Mckeon is c/o of Back Pain (Lower ) and Leg Pain (Bilateral, knee down)      HPI:     Pt here today for follow-up of back/leg pain.    Still having issues with the same leg/back pain  Prednisone helped for approx 3 weeks  Tramadol did not help; also heat/ice and elevation are not effective  Taking Ibuprofen 800 mg BID-TID    Pain in legs and low back  Worse than at last visit - more often  Tingling in feet is worse, still R>L; does extend up each leg to about the level of the knee - intermittent    Pain in legs is \"throbbing\", worse in lower legs but can be up in upper legs as well  Leg pain can occur without back pain, but will always have leg pain if she is also having back pain  Back pain is achy, sometimes one-sided or all the way across  Pain occurs with activity but not worsened by activity  Wakes her out of sleep  Had a bad night last night with pain - was unable to fall asleep for hours    Tried Magnesium x 3 weeks - not effective; has stopped this    Leaves in 2 days for [place] - will be gone for 2 days.        Past Medical History:   Diagnosis Date    Hyperlipidemia     Hypertension     Hypothyroidism     Obesity       Past Surgical History:   Procedure Laterality Date    DILATION AND CURETTAGE OF UTERUS      January, 1995 following miscarriage    PARTIAL HYSTERECTOMY (CERVIX NOT REMOVED)  09/25/2020    [facility] - Dr. Kimball; uterus & both ovaries/fallopian tubes removed    THYROIDECTOMY      20 years old - due to abscess     Family History   Problem Relation Age of Onset    Liver Disease Mother         was felt to be related to early gastic bypass surgery    Hypertension Mother     Cancer Father         Esophageal    Diabetes Father     Heart Attack Father         age 59

## 2024-12-19 ENCOUNTER — HOSPITAL ENCOUNTER (OUTPATIENT)
Age: 64
Discharge: HOME | End: 2024-12-19
Payer: MEDICAID

## 2024-12-19 DIAGNOSIS — M79.605 BILATERAL LEG PAIN: ICD-10-CM

## 2024-12-19 DIAGNOSIS — R20.2 TINGLING IN EXTREMITIES: ICD-10-CM

## 2024-12-19 DIAGNOSIS — M79.604 BILATERAL LEG PAIN: ICD-10-CM

## 2024-12-19 DIAGNOSIS — E03.9 ACQUIRED HYPOTHYROIDISM: ICD-10-CM

## 2024-12-19 LAB
FOLATE SERPL-MCNC: >40 NG/ML (ref 4.8–24.2)
T4 FREE SERPL-MCNC: 1.3 NG/DL (ref 0.92–1.68)
TSH SERPL DL<=0.05 MIU/L-ACNC: 5.97 UIU/ML (ref 0.3–5)
VIT B12 SERPL-MCNC: 388 PG/ML (ref 232–1245)

## 2024-12-19 PROCEDURE — 84439 ASSAY OF FREE THYROXINE: CPT

## 2024-12-19 PROCEDURE — 36415 COLL VENOUS BLD VENIPUNCTURE: CPT

## 2024-12-19 PROCEDURE — 84443 ASSAY THYROID STIM HORMONE: CPT

## 2024-12-19 PROCEDURE — 82746 ASSAY OF FOLIC ACID SERUM: CPT

## 2024-12-19 PROCEDURE — 82607 VITAMIN B-12: CPT

## 2024-12-24 ENCOUNTER — PATIENT MESSAGE (OUTPATIENT)
Dept: FAMILY MEDICINE CLINIC | Age: 64
End: 2024-12-24

## 2024-12-26 ENCOUNTER — OFFICE VISIT (OUTPATIENT)
Dept: PRIMARY CARE CLINIC | Age: 64
End: 2024-12-26
Payer: MEDICAID

## 2024-12-26 VITALS
OXYGEN SATURATION: 98 % | BODY MASS INDEX: 35.53 KG/M2 | TEMPERATURE: 98 F | WEIGHT: 207 LBS | HEART RATE: 74 BPM | SYSTOLIC BLOOD PRESSURE: 120 MMHG | DIASTOLIC BLOOD PRESSURE: 74 MMHG

## 2024-12-26 DIAGNOSIS — M79.604 BILATERAL LEG PAIN: Primary | ICD-10-CM

## 2024-12-26 DIAGNOSIS — M51.362 DEGENERATION OF INTERVERTEBRAL DISC OF LUMBAR REGION WITH DISCOGENIC BACK PAIN AND LOWER EXTREMITY PAIN: ICD-10-CM

## 2024-12-26 DIAGNOSIS — M79.605 BILATERAL LEG PAIN: Primary | ICD-10-CM

## 2024-12-26 PROCEDURE — 99214 OFFICE O/P EST MOD 30 MIN: CPT | Performed by: NURSE PRACTITIONER

## 2024-12-26 PROCEDURE — 99212 OFFICE O/P EST SF 10 MIN: CPT | Performed by: NURSE PRACTITIONER

## 2024-12-26 PROCEDURE — 3074F SYST BP LT 130 MM HG: CPT | Performed by: NURSE PRACTITIONER

## 2024-12-26 PROCEDURE — 3078F DIAST BP <80 MM HG: CPT | Performed by: NURSE PRACTITIONER

## 2024-12-26 RX ORDER — HYDROCODONE BITARTRATE AND ACETAMINOPHEN 5; 325 MG/1; MG/1
1 TABLET ORAL EVERY 6 HOURS PRN
Qty: 28 TABLET | Refills: 0 | Status: SHIPPED | OUTPATIENT
Start: 2024-12-26 | End: 2025-01-02

## 2024-12-26 ASSESSMENT — PATIENT HEALTH QUESTIONNAIRE - PHQ9
1. LITTLE INTEREST OR PLEASURE IN DOING THINGS: NOT AT ALL
SUM OF ALL RESPONSES TO PHQ QUESTIONS 1-9: 0
SUM OF ALL RESPONSES TO PHQ9 QUESTIONS 1 & 2: 0
SUM OF ALL RESPONSES TO PHQ QUESTIONS 1-9: 0
2. FEELING DOWN, DEPRESSED OR HOPELESS: NOT AT ALL
SUM OF ALL RESPONSES TO PHQ QUESTIONS 1-9: 0
SUM OF ALL RESPONSES TO PHQ QUESTIONS 1-9: 0

## 2024-12-26 NOTE — PROGRESS NOTES
lumbar region with discogenic back pain and lower extremity pain           Plan:   - xray showed DDD of lumbar. Pain seems to be nerve pain, plan to order MRI of lumbar spine   -OARRs reviewed and appropriate. Norco given for pain  -keep f/u appt with PCP    Orders Placed This Encounter   Procedures    MRI LUMBAR SPINE WO CONTRAST     Standing Status:   Future     Standing Expiration Date:   12/26/2025      Outpatient Encounter Medications as of 12/26/2024   Medication Sig Dispense Refill    HYDROcodone-acetaminophen (NORCO) 5-325 MG per tablet Take 1 tablet by mouth every 6 hours as needed for Pain for up to 7 days. Intended supply: 3 days. Take lowest dose possible to manage pain Max Daily Amount: 4 tablets 28 tablet 0    losartan (COZAAR) 50 MG tablet Take 1 tablet by mouth daily 90 tablet 3    levothyroxine (SYNTHROID) 200 MCG tablet Take 1 tab by mouth daily, except 1/2 tab on Sat/Sunday's. 90 tablet 1    ezetimibe (ZETIA) 10 MG tablet Take 1 tablet by mouth daily 90 tablet 3    pravastatin (PRAVACHOL) 80 MG tablet Take 1 tablet by mouth daily 90 tablet 3    escitalopram (LEXAPRO) 10 MG tablet Take 1 tablet by mouth daily 90 tablet 3    calcium carbonate (OSCAL) 500 MG TABS tablet Take 1 tablet by mouth daily      Multiple Vitamin (MULTI-VITAMIN DAILY PO) Take  by mouth.       Facility-Administered Encounter Medications as of 12/26/2024   Medication Dose Route Frequency Provider Last Rate Last Admin    triamcinolone acetonide (KENALOG-40) injection 80 mg  80 mg Intra-artICUlar Once Venus Fierro, APRN - CNP        BUPivacaine (MARCAINE) 0.5 % injection 25 mg  5 mL Intra-artICUlar Once Venus Fierro, APRN - CNP                Avi Alex, APRN - CNP

## 2024-12-27 ENCOUNTER — OFFICE VISIT (OUTPATIENT)
Dept: FAMILY MEDICINE CLINIC | Age: 64
End: 2024-12-27
Payer: MEDICAID

## 2024-12-27 VITALS
BODY MASS INDEX: 35.07 KG/M2 | WEIGHT: 205.4 LBS | HEIGHT: 64 IN | OXYGEN SATURATION: 97 % | HEART RATE: 74 BPM | SYSTOLIC BLOOD PRESSURE: 138 MMHG | DIASTOLIC BLOOD PRESSURE: 76 MMHG

## 2024-12-27 DIAGNOSIS — R20.2 TINGLING IN EXTREMITIES: ICD-10-CM

## 2024-12-27 DIAGNOSIS — E03.9 ACQUIRED HYPOTHYROIDISM: ICD-10-CM

## 2024-12-27 DIAGNOSIS — M79.605 BILATERAL LEG PAIN: Primary | ICD-10-CM

## 2024-12-27 DIAGNOSIS — M54.50 BILATERAL LOW BACK PAIN, UNSPECIFIED CHRONICITY, UNSPECIFIED WHETHER SCIATICA PRESENT: ICD-10-CM

## 2024-12-27 DIAGNOSIS — M79.604 BILATERAL LEG PAIN: Primary | ICD-10-CM

## 2024-12-27 PROBLEM — R19.5 POSITIVE COLORECTAL CANCER SCREENING USING COLOGUARD TEST: Status: RESOLVED | Noted: 2022-03-30 | Resolved: 2024-12-27

## 2024-12-27 PROCEDURE — 3078F DIAST BP <80 MM HG: CPT | Performed by: FAMILY MEDICINE

## 2024-12-27 PROCEDURE — 3075F SYST BP GE 130 - 139MM HG: CPT | Performed by: FAMILY MEDICINE

## 2024-12-27 PROCEDURE — 99214 OFFICE O/P EST MOD 30 MIN: CPT | Performed by: FAMILY MEDICINE

## 2024-12-27 PROCEDURE — 99214 OFFICE O/P EST MOD 30 MIN: CPT

## 2024-12-27 RX ORDER — LEVOTHYROXINE SODIUM 200 UG/1
TABLET ORAL
Qty: 90 TABLET | Refills: 0 | Status: SHIPPED | OUTPATIENT
Start: 2024-12-27

## 2024-12-27 RX ORDER — GABAPENTIN 100 MG/1
CAPSULE ORAL
Qty: 30 CAPSULE | Refills: 0 | Status: SHIPPED | OUTPATIENT
Start: 2024-12-27 | End: 2025-01-03 | Stop reason: SDUPTHER

## 2024-12-27 NOTE — PROGRESS NOTES
distress.      Breath sounds: Normal breath sounds.   Musculoskeletal:         General: Tenderness present.      Right lower leg: No edema.      Left lower leg: No edema.   Skin:     General: Skin is warm and dry.   Neurological:      General: No focal deficit present.      Mental Status: She is alert and oriented to person, place, and time.   Psychiatric:         Mood and Affect: Mood normal.         Assessment:      1. Bilateral leg pain  2. Bilateral low back pain, unspecified chronicity, unspecified whether sciatica present  3. Tingling in extremities  4. Acquired hypothyroidism  -     TSH reflex to FT4; Future  -     levothyroxine (SYNTHROID) 200 MCG tablet; Take 1 tab by mouth daily, except 1/2 tab on Sat/Sunday's., Disp-90 tablet, R-0Normal         Plan:      Await work-up, as already ordered in  for b/l leg and back pain.    Thyroid labs reviewed and Synthroid refilled today.    Return for already scheduled OV on 3/25.    Orders Placed This Encounter   Procedures    TSH reflex to FT4     Standing Status:   Future     Standing Expiration Date:   12/27/2025     Orders Placed This Encounter   Medications    levothyroxine (SYNTHROID) 200 MCG tablet     Sig: Take 1 tab by mouth daily, except 1/2 tab on Sat/Sunday's.     Dispense:  90 tablet     Refill:  0    DISCONTD: gabapentin (NEURONTIN) 100 MG capsule     Sig: Take 1 capsule by mouth BID as needed for pain; may increase to 2 capsules BID after 4-5 days, if needed.     Dispense:  30 capsule     Refill:  0           Patient given educational materials - see patient instructions.  Discussed use, benefit, and side effects of prescribed medications.  All patient questions answered.  Pt voiced understanding.  Reviewed health maintenance.            Electronically signed by Mary Schneider DO, DO on 1/13/2025 at 11:59 PM

## 2025-01-02 ENCOUNTER — HOSPITAL ENCOUNTER (OUTPATIENT)
Dept: MRI IMAGING | Age: 65
Discharge: HOME | End: 2025-01-04
Payer: MEDICAID

## 2025-01-02 ENCOUNTER — TELEPHONE (OUTPATIENT)
Dept: PRIMARY CARE CLINIC | Age: 65
End: 2025-01-02

## 2025-01-02 DIAGNOSIS — M51.362 DEGENERATION OF INTERVERTEBRAL DISC OF LUMBAR REGION WITH DISCOGENIC BACK PAIN AND LOWER EXTREMITY PAIN: ICD-10-CM

## 2025-01-02 DIAGNOSIS — M79.604 BILATERAL LEG PAIN: ICD-10-CM

## 2025-01-02 DIAGNOSIS — M79.605 BILATERAL LEG PAIN: ICD-10-CM

## 2025-01-02 PROCEDURE — 72148 MRI LUMBAR SPINE W/O DYE: CPT

## 2025-01-02 NOTE — TELEPHONE ENCOUNTER
On Call Received MRI report from [facility] regarding patients STAT MRI lumbar spine.  Does have lytic lesion with large soft tissue component extending into the epidural space.  Does have severe compression of the cauda equina nerve roots.  Concerning for metastatic disease.  Did discuss this with patient.  She is not having any symptoms of loss of bowel or bladder function or weakness in lower extremities.  Reported pain has been present for a prolonged period of time and maybe had slight improvement with gabapentin.  Did discuss that if she has any progression of symptoms she would need to go to the ER this evening, but otherwise either her PCP or Arsh Alex NP who had evaluated her and ordered MRI will be making further recommendations and referrals.  Will need to see neurosurgeon which will need to be coordinated ASAP.  Patient expressed understanding and I did let her know that this could be indicative of metastatic disease.  As it is after hours and she is stable with regards to her symptoms will defer to her PCP and/or Arsh Alex NP who had ordered MRI to make further arrangements for further evaluation and management during regular business hours tomorrow.

## 2025-01-03 ENCOUNTER — OFFICE VISIT (OUTPATIENT)
Dept: FAMILY MEDICINE CLINIC | Age: 65
End: 2025-01-03
Payer: MEDICAID

## 2025-01-03 VITALS
DIASTOLIC BLOOD PRESSURE: 84 MMHG | TEMPERATURE: 97.4 F | OXYGEN SATURATION: 99 % | HEIGHT: 64 IN | HEART RATE: 83 BPM | WEIGHT: 204 LBS | BODY MASS INDEX: 34.83 KG/M2 | RESPIRATION RATE: 18 BRPM | SYSTOLIC BLOOD PRESSURE: 146 MMHG

## 2025-01-03 DIAGNOSIS — R68.89 SUSPECTED MALIGNANT NEOPLASM: ICD-10-CM

## 2025-01-03 DIAGNOSIS — M54.50 BILATERAL LOW BACK PAIN, UNSPECIFIED CHRONICITY, UNSPECIFIED WHETHER SCIATICA PRESENT: ICD-10-CM

## 2025-01-03 DIAGNOSIS — M79.605 BILATERAL LEG PAIN: ICD-10-CM

## 2025-01-03 DIAGNOSIS — F41.9 ANXIETY DUE TO INVASIVE PROCEDURE: ICD-10-CM

## 2025-01-03 DIAGNOSIS — M79.604 BILATERAL LEG PAIN: ICD-10-CM

## 2025-01-03 DIAGNOSIS — M89.9 LESION OF VERTEBRA: Primary | ICD-10-CM

## 2025-01-03 DIAGNOSIS — M54.16 LUMBAR NERVE ROOT COMPRESSION: ICD-10-CM

## 2025-01-03 PROCEDURE — 99214 OFFICE O/P EST MOD 30 MIN: CPT

## 2025-01-03 PROCEDURE — 99214 OFFICE O/P EST MOD 30 MIN: CPT | Performed by: FAMILY MEDICINE

## 2025-01-03 PROCEDURE — 3077F SYST BP >= 140 MM HG: CPT | Performed by: FAMILY MEDICINE

## 2025-01-03 PROCEDURE — 3079F DIAST BP 80-89 MM HG: CPT | Performed by: FAMILY MEDICINE

## 2025-01-03 NOTE — PROGRESS NOTES
[facility]  [address] [place] [address]  [phone]      Therese Mckeon is a 65 y.o. female who presents today for her medical conditions/complaints as noted below.  Therese Mckeon is c/o of review MRI results      HPI:     Pt here today for follow-up of MRI results.    Pt had MRI lumbar spine yesterday, which showed \"Lytic lesion involving the L4 vertebral body with a large soft tissue  component extending into the epidural space. There is severe compression of  the cauda equina nerve roots and extension of soft tissue into the foraminal  and para foraminal region. Findings are concerning for metastatic disease.  Causing cauda equina compression.  Neuro surgical consult and further  evaluation with contrast-enhanced MRI is recommended.\"    Taking Gabapentin 200 mg BID  Not having as much throbbing; still achy but not as painful  Denies sx's of cauda equina syndrome.        Past Medical History:   Diagnosis Date    Hyperlipidemia     Hypertension     Hypothyroidism     Obesity     Smoker     Spinal cord lesion (HCC)       Past Surgical History:   Procedure Laterality Date    BONE BIOPSY N/A 01/17/2025    COLONOSCOPY      CT BIOPSY DEEP BONE PERCUTANEOUS  1/17/2025    CT BIOPSY DEEP BONE PERCUTANEOUS 1/17/2025 [facility] CT SCAN    CYSTOCELE REPAIR  09/25/2020    Midurethral sling via the retropubic route of the Clarity Advantage Fit polypropylene side. REF is [id number], LOT number is [id number]; urethrolysis and excision of suburethral band    DILATION AND CURETTAGE OF UTERUS  1995    following miscarriage    PARTIAL HYSTERECTOMY (CERVIX NOT REMOVED)  09/25/2020    [facility] - Dr. Kimball; uterus & both ovaries/fallopian tubes removed    THYROIDECTOMY  1980    due to abscess    UTERUS SURGERY  09/25/2020    Robot-assisted laparoscopic sacrocolpopexy with polypropylene Y-shaped mesh of the [employer] Clarity type (REF [id number], LOT [id number])

## 2025-01-06 ENCOUNTER — TELEPHONE (OUTPATIENT)
Dept: FAMILY MEDICINE CLINIC | Age: 65
End: 2025-01-06

## 2025-01-06 RX ORDER — GABAPENTIN 100 MG/1
200 CAPSULE ORAL 2 TIMES DAILY
Qty: 120 CAPSULE | Refills: 0 | Status: SHIPPED | OUTPATIENT
Start: 2025-01-06 | End: 2025-01-22 | Stop reason: SDUPTHER

## 2025-01-06 RX ORDER — LORAZEPAM 1 MG/1
TABLET ORAL
Qty: 2 TABLET | Refills: 1 | Status: SHIPPED | OUTPATIENT
Start: 2025-01-06 | End: 2025-01-17

## 2025-01-06 RX ORDER — DEXAMETHASONE 4 MG/1
4 TABLET ORAL 2 TIMES DAILY WITH MEALS
Qty: 40 TABLET | Refills: 0 | Status: SHIPPED | OUTPATIENT
Start: 2025-01-06 | End: 2025-01-22 | Stop reason: SDUPTHER

## 2025-01-06 NOTE — TELEPHONE ENCOUNTER
Pt calling as she was seen on Friday and meds were to be sent to pharmacy and there is nothing there, please send in new meds and Gabapentin.

## 2025-01-08 ENCOUNTER — OFFICE VISIT (OUTPATIENT)
Dept: NEUROSURGERY | Age: 65
End: 2025-01-08
Payer: MEDICAID

## 2025-01-08 ENCOUNTER — TELEPHONE (OUTPATIENT)
Dept: ONCOLOGY | Age: 65
End: 2025-01-08

## 2025-01-08 ENCOUNTER — PATIENT MESSAGE (OUTPATIENT)
Dept: FAMILY MEDICINE CLINIC | Age: 65
End: 2025-01-08

## 2025-01-08 VITALS
DIASTOLIC BLOOD PRESSURE: 74 MMHG | HEART RATE: 111 BPM | SYSTOLIC BLOOD PRESSURE: 162 MMHG | WEIGHT: 204 LBS | BODY MASS INDEX: 34.83 KG/M2 | HEIGHT: 64 IN

## 2025-01-08 DIAGNOSIS — Z01.812 PRE-PROCEDURE LAB EXAM: Primary | ICD-10-CM

## 2025-01-08 DIAGNOSIS — M48.062 LUMBAR STENOSIS WITH NEUROGENIC CLAUDICATION: ICD-10-CM

## 2025-01-08 DIAGNOSIS — C80.1 MALIGNANT NEOPLASM METASTATIC TO LUMBAR SPINE WITH UNKNOWN PRIMARY SITE (HCC): Primary | ICD-10-CM

## 2025-01-08 DIAGNOSIS — C79.51 MALIGNANT NEOPLASM METASTATIC TO LUMBAR SPINE WITH UNKNOWN PRIMARY SITE (HCC): Primary | ICD-10-CM

## 2025-01-08 PROCEDURE — 3078F DIAST BP <80 MM HG: CPT | Performed by: NEUROLOGICAL SURGERY

## 2025-01-08 PROCEDURE — 3077F SYST BP >= 140 MM HG: CPT | Performed by: NEUROLOGICAL SURGERY

## 2025-01-08 PROCEDURE — 99204 OFFICE O/P NEW MOD 45 MIN: CPT | Performed by: NEUROLOGICAL SURGERY

## 2025-01-08 NOTE — TELEPHONE ENCOUNTER
Name: Therese Mckeon  : 1960  MRN: [id number]    Oncology Navigation- Initial Note:    Intake-  Contact Type: Telephone    Notes: Dr. Dobbins alerted writer to pt's case & requested oncology navigation.  Attempted to contact pt, no answer, VM left requested contact writer @ [phone].  Will continue to follow.    Electronically signed by Tanisha Bhatia RN on 2025 at 3:18 PM

## 2025-01-08 NOTE — PROGRESS NOTES
500 MG TABS tablet Take 1 tablet by mouth daily      Multiple Vitamin (MULTI-VITAMIN DAILY PO) Take  by mouth.      LORazepam (ATIVAN) 1 MG tablet Take 1 tab by mouth 45 mins prior to procedure; may take additional 1/2 tab 15 mins prior to procedure. (Patient not taking: Reported on 1/8/2025) 2 tablet 1     Current Facility-Administered Medications on File Prior to Visit   Medication Dose Route Frequency Provider Last Rate Last Admin    triamcinolone acetonide (KENALOG-40) injection 80 mg  80 mg Intra-artICUlar Once Venus Fierro APRN - CNP        BUPivacaine (MARCAINE) 0.5 % injection 25 mg  5 mL Intra-artICUlar Once Venus Fierro APRN - CNP         Social History     Tobacco Use    Smoking status: Every Day     Current packs/day: 1.00     Average packs/day: 1 pack/day for 35.0 years (35.0 ttl pk-yrs)     Types: Cigarettes    Smokeless tobacco: Never    Tobacco comments:     LI Laboy RRT 6/28/16   Vaping Use    Vaping status: Never Used   Substance Use Topics    Alcohol use: No    Drug use: No       Allergies   Allergen Reactions    Amoxicillin Er Itching     Makes blood pressure drop       Review of Systems  ROS: Back pain.  Lower extremity ache.  Abnormal sensation.    Physical Exam:      BP (!) 162/74   Pulse (!) 111   Ht 1.626 m (5' 4\")   Wt 92.5 kg (204 lb)   LMP 09/21/2014   BMI 35.02 kg/m²   Estimated body mass index is 35.02 kg/m² as calculated from the following:    Height as of this encounter: 1.626 m (5' 4\").    Weight as of this encounter: 92.5 kg (204 lb).    General:  Therese Mckeon is a 64 y.o. year old female who appears her stated age.   HEENT: Normocephalic atraumatic. Neck supple.  Chest: regular rate; pulses equal. Equal chest rise and fall  Abdomen: Soft nondistended.   Ext: DP equal with good capillary refill  Neuro    Mentation  Appropriate affect   oriented    Cranial Nerves:   Pupils equal and reactive to light  Extraocular motion intact  Face symmetric  No

## 2025-01-09 ENCOUNTER — HOSPITAL ENCOUNTER (OUTPATIENT)
Age: 65
Discharge: HOME | End: 2025-01-09
Payer: MEDICAID

## 2025-01-09 ENCOUNTER — TELEPHONE (OUTPATIENT)
Dept: INTERVENTIONAL RADIOLOGY/VASCULAR | Age: 65
End: 2025-01-09

## 2025-01-09 DIAGNOSIS — C79.51 MALIGNANT NEOPLASM METASTATIC TO LUMBAR SPINE WITH UNKNOWN PRIMARY SITE (HCC): Primary | ICD-10-CM

## 2025-01-09 DIAGNOSIS — Z01.812 PRE-PROCEDURE LAB EXAM: ICD-10-CM

## 2025-01-09 DIAGNOSIS — C80.1 MALIGNANT NEOPLASM METASTATIC TO LUMBAR SPINE WITH UNKNOWN PRIMARY SITE (HCC): Primary | ICD-10-CM

## 2025-01-09 LAB
CREAT SERPL-MCNC: 0.7 MG/DL (ref 0.5–0.9)
GFR, ESTIMATED: >90 ML/MIN/1.73M2

## 2025-01-09 PROCEDURE — 36415 COLL VENOUS BLD VENIPUNCTURE: CPT

## 2025-01-09 PROCEDURE — 82565 ASSAY OF CREATININE: CPT

## 2025-01-09 NOTE — TELEPHONE ENCOUNTER
IR received a request for a L4 biopsy.  Dr Street reviewed and said CT chest/abd/pelvis was ordered, will reassess after testing is complete, to see if there is an alternate site to biopsy.

## 2025-01-10 ENCOUNTER — TELEPHONE (OUTPATIENT)
Dept: ONCOLOGY | Age: 65
End: 2025-01-10

## 2025-01-10 NOTE — TELEPHONE ENCOUNTER
Dr. Livingston would like to wait until bx is scheduled before bringing the patient in to see him.  I don't know if Dr. Mirza can see patient in [place] sooner as an option and follow up in [place] once rad onc appt is scheduled?

## 2025-01-10 NOTE — TELEPHONE ENCOUNTER
Covering for DUNG Miller     Referrals placed for Dr Mirza and Dr Livingston. Writer will update [facility] offices to confirm referrals were received and request pt be scheduled.

## 2025-01-10 NOTE — TELEPHONE ENCOUNTER
Name: Therese Mckeon  : 1960  MRN: [id number]    Oncology Navigation Follow-Up Note    Notes: Per Dr Mirza, pt to be seen in [place] clinic on  at 4pm. [facility] schedule would not allow pt to be seen sooner. Writer spoke with Kristy in [place] clinic, updated. She allen lcontact pt to offer her earlier times as there are multiple openings.     Dr Livingston would like pt to be scheduled after her biopsy. Daniela, , will contact pt once biopsy is scheduled. (Biopsy is pending IR review of imaging)     Pt called and updated on above. No barriers were voiced.       Electronically signed by Jojo Farrar RN on 1/10/2025 at 1:18 PM

## 2025-01-13 ENCOUNTER — HOSPITAL ENCOUNTER (OUTPATIENT)
Dept: MRI IMAGING | Age: 65
Discharge: HOME | End: 2025-01-15
Attending: FAMILY MEDICINE
Payer: MEDICAID

## 2025-01-13 ENCOUNTER — HOSPITAL ENCOUNTER (OUTPATIENT)
Dept: CT IMAGING | Age: 65
Discharge: HOME | End: 2025-01-15
Attending: FAMILY MEDICINE
Payer: MEDICAID

## 2025-01-13 ENCOUNTER — TELEPHONE (OUTPATIENT)
Dept: ONCOLOGY | Age: 65
End: 2025-01-13

## 2025-01-13 DIAGNOSIS — R68.89 SUSPECTED MALIGNANT NEOPLASM: ICD-10-CM

## 2025-01-13 DIAGNOSIS — M89.9 LESION OF VERTEBRA: ICD-10-CM

## 2025-01-13 PROCEDURE — 72157 MRI CHEST SPINE W/O & W/DYE: CPT

## 2025-01-13 PROCEDURE — 6360000004 HC RX CONTRAST MEDICATION: Performed by: FAMILY MEDICINE

## 2025-01-13 PROCEDURE — 70553 MRI BRAIN STEM W/O & W/DYE: CPT

## 2025-01-13 PROCEDURE — 72156 MRI NECK SPINE W/O & W/DYE: CPT

## 2025-01-13 PROCEDURE — 71260 CT THORAX DX C+: CPT

## 2025-01-13 PROCEDURE — A9579 GAD-BASE MR CONTRAST NOS,1ML: HCPCS | Performed by: FAMILY MEDICINE

## 2025-01-13 PROCEDURE — 74177 CT ABD & PELVIS W/CONTRAST: CPT

## 2025-01-13 RX ORDER — IOPAMIDOL 755 MG/ML
100 INJECTION, SOLUTION INTRAVASCULAR
Status: COMPLETED | OUTPATIENT
Start: 2025-01-13 | End: 2025-01-13

## 2025-01-13 RX ADMIN — GADOTERIDOL 15 ML: 279.3 INJECTION, SOLUTION INTRAVENOUS at 12:04

## 2025-01-13 RX ADMIN — IOPAMIDOL 100 ML: 755 INJECTION, SOLUTION INTRAVENOUS at 08:11

## 2025-01-13 NOTE — TELEPHONE ENCOUNTER
Name: Therese Mckeon  : 1960  MRN: [id number]    Oncology Navigation- Initial Note:    Intake-  Contact Type: Telephone    Continuum of Care: Diagnosis/Active Treatment    Smoking hx: Current 1PPD x40 years.  Discussed MH smoking cessation referral, pt declined @ this time.  Smoking cessation assistance and resources provided.     Notes: Introductory phone call made to patient.  Instructed patient writer will be navigating oncology care & may call writer w/questions/concerns @ [phone] prn.  Discussed potential barriers to care, pt denied any.  Inquired on alcohol/drug use, pt denied any.  Pt stated completed all imaging today & scheduled for Dr. Mirza consult tomorrow @ [facility].  Instructed pt writer will f/u w/ IR  in am to inquire on expediting bx.  Pt verbalized understanding & denied questions/concerns.  Encouraged to contact writer prn.  [facility] written materials along w/writer's business card mailed to patient.  Will continue to follow.     Electronically signed by Tanisha Bhatia RN on 2025 at 3:52 PM

## 2025-01-14 ENCOUNTER — OFFICE VISIT (OUTPATIENT)
Dept: ONCOLOGY | Age: 65
End: 2025-01-14
Payer: MEDICAID

## 2025-01-14 ENCOUNTER — HOSPITAL ENCOUNTER (OUTPATIENT)
Age: 65
Discharge: HOME | End: 2025-01-14
Payer: MEDICAID

## 2025-01-14 ENCOUNTER — TELEPHONE (OUTPATIENT)
Dept: ONCOLOGY | Age: 65
End: 2025-01-14

## 2025-01-14 VITALS
RESPIRATION RATE: 20 BRPM | BODY MASS INDEX: 34.89 KG/M2 | HEIGHT: 64 IN | OXYGEN SATURATION: 96 % | SYSTOLIC BLOOD PRESSURE: 168 MMHG | WEIGHT: 204.4 LBS | HEART RATE: 77 BPM | TEMPERATURE: 97.2 F | DIASTOLIC BLOOD PRESSURE: 70 MMHG

## 2025-01-14 DIAGNOSIS — M89.9 BONE LESION: Primary | ICD-10-CM

## 2025-01-14 DIAGNOSIS — M89.9 BONE LESION: ICD-10-CM

## 2025-01-14 LAB
AFP SERPL-MCNC: <1.8 UG/L
ALBUMIN SERPL-MCNC: 4.4 G/DL (ref 3.5–5.2)
ALBUMIN/GLOB SERPL: 1.5 {RATIO} (ref 1–2.5)
ALP SERPL-CCNC: 89 U/L (ref 35–104)
ALT SERPL-CCNC: 85 U/L (ref 5–33)
ANION GAP SERPL CALCULATED.3IONS-SCNC: 15 MMOL/L (ref 9–17)
AST SERPL-CCNC: 38 U/L
BASOPHILS # BLD: <0.03 K/UL (ref 0–0.2)
BASOPHILS NFR BLD: 0 % (ref 0–2)
BILIRUB SERPL-MCNC: 0.2 MG/DL (ref 0.3–1.2)
BUN SERPL-MCNC: 31 MG/DL (ref 8–23)
BUN/CREAT SERPL: 44 (ref 9–20)
CALCIUM SERPL-MCNC: 9.4 MG/DL (ref 8.6–10.4)
CANCER AG125 SERPL-ACNC: 34 U/ML (ref 0–38)
CANCER AG19-9 SERPL IA-ACNC: 8 U/ML (ref 0–35)
CEA SERPL-MCNC: 3.4 NG/ML (ref 0–3.8)
CHLORIDE SERPL-SCNC: 99 MMOL/L (ref 98–107)
CO2 SERPL-SCNC: 23 MMOL/L (ref 20–31)
CREAT SERPL-MCNC: 0.7 MG/DL (ref 0.5–0.9)
EOSINOPHIL # BLD: <0.03 K/UL (ref 0–0.44)
EOSINOPHILS RELATIVE PERCENT: 0 % (ref 1–4)
ERYTHROCYTE [DISTWIDTH] IN BLOOD BY AUTOMATED COUNT: 14 % (ref 11.8–14.4)
GFR, ESTIMATED: >90 ML/MIN/1.73M2
GLUCOSE SERPL-MCNC: 104 MG/DL (ref 70–99)
HCT VFR BLD AUTO: 44.8 % (ref 36.3–47.1)
HGB BLD-MCNC: 14.7 G/DL (ref 11.9–15.1)
IMM GRANULOCYTES # BLD AUTO: 0.14 K/UL (ref 0–0.3)
IMM GRANULOCYTES NFR BLD: 1 %
LYMPHOCYTES NFR BLD: 1.21 K/UL (ref 1.1–3.7)
LYMPHOCYTES RELATIVE PERCENT: 7 % (ref 24–43)
MCH RBC QN AUTO: 29 PG (ref 25.2–33.5)
MCHC RBC AUTO-ENTMCNC: 32.8 G/DL (ref 25.2–33.5)
MCV RBC AUTO: 88.4 FL (ref 82.6–102.9)
MONOCYTES NFR BLD: 0.87 K/UL (ref 0.1–1.2)
MONOCYTES NFR BLD: 5 % (ref 3–12)
NEUTROPHILS NFR BLD: 87 % (ref 36–65)
NEUTS SEG NFR BLD: 15.07 K/UL (ref 1.5–8.1)
NRBC BLD-RTO: 0 PER 100 WBC
PLATELET # BLD AUTO: 304 K/UL (ref 138–453)
PMV BLD AUTO: 10.2 FL (ref 8.1–13.5)
POTASSIUM SERPL-SCNC: 4.5 MMOL/L (ref 3.7–5.3)
PROT SERPL-MCNC: 7.4 G/DL (ref 6.4–8.3)
RBC # BLD AUTO: 5.07 M/UL (ref 3.95–5.11)
SODIUM SERPL-SCNC: 137 MMOL/L (ref 135–144)
WBC OTHER # BLD: 17.3 K/UL (ref 3.5–11.3)

## 2025-01-14 PROCEDURE — 86304 IMMUNOASSAY TUMOR CA 125: CPT

## 2025-01-14 PROCEDURE — 99203 OFFICE O/P NEW LOW 30 MIN: CPT | Performed by: INTERNAL MEDICINE

## 2025-01-14 PROCEDURE — 86301 IMMUNOASSAY TUMOR CA 19-9: CPT

## 2025-01-14 PROCEDURE — 3077F SYST BP >= 140 MM HG: CPT | Performed by: INTERNAL MEDICINE

## 2025-01-14 PROCEDURE — 99205 OFFICE O/P NEW HI 60 MIN: CPT | Performed by: INTERNAL MEDICINE

## 2025-01-14 PROCEDURE — 85025 COMPLETE CBC W/AUTO DIFF WBC: CPT

## 2025-01-14 PROCEDURE — 3078F DIAST BP <80 MM HG: CPT | Performed by: INTERNAL MEDICINE

## 2025-01-14 PROCEDURE — 80053 COMPREHEN METABOLIC PANEL: CPT

## 2025-01-14 PROCEDURE — 82378 CARCINOEMBRYONIC ANTIGEN: CPT

## 2025-01-14 PROCEDURE — 83521 IG LIGHT CHAINS FREE EACH: CPT

## 2025-01-14 PROCEDURE — 84155 ASSAY OF PROTEIN SERUM: CPT

## 2025-01-14 PROCEDURE — 82105 ALPHA-FETOPROTEIN SERUM: CPT

## 2025-01-14 PROCEDURE — 36415 COLL VENOUS BLD VENIPUNCTURE: CPT

## 2025-01-14 PROCEDURE — 86334 IMMUNOFIX E-PHORESIS SERUM: CPT

## 2025-01-14 PROCEDURE — 84165 PROTEIN E-PHORESIS SERUM: CPT

## 2025-01-14 NOTE — PROGRESS NOTES
Therese Mckeon                                                                                                                  1/14/2025  MRN:   [id number]  YOB: 1960  PCP:                           Mary Schneider DO  Referring Physician: Mu  Treating Physician Name: MITUL RICE MD      Reason for consultation:  Chief Complaint   Patient presents with    New Patient     Bone mets     Lytic bony lesion    Current problems:  Lytic spine lesion concerning for metastatic disease  Tobacco dependence  Back pain  Medical comorbidities: Dyslipidemia hypertension hypothyroidism    Active and recent treatments:  Biopsy  Concerning CT PET  NGS testing on biopsy specimen    Summary of Case/History:  Therese Mckeon a 65 y.o.female is a patient with lytic lesions of the spine concerning for metastatic disease presents to the clinic to establish care and for further workup and evaluation.  Patient was initially referred to neurosurgery because of significant back pain.  Patient subsequently had MRI of the lumbar spine which showed lytic process at L4 with soft tissue component extending into the spinal canal.  Patient has been started on Decadron.  Her back pain has improved.  Patient yesterday had a CT chest and pelvis.  No obvious finding suggestive of a primary tumor noted on the CT chest and pelvis.  MRI cervical and thoracic spine show addition bony lesion concerning for metastatic disease.  Patient has a biopsy scheduled for Friday.    Past Medical History:   Past Medical History:   Diagnosis Date    Hyperlipidemia     Hypertension     Hypothyroidism     Obesity      Past Surgical History:  Past Surgical History:   Procedure Laterality Date    CYSTOCELE REPAIR  09/25/2020    Midurethral sling via the retropubic route of the [employer] Advantage Fit polypropylene side. REF is [id number], LOT number is [id number]; urethrolysis and excision of suburethral band    DILATION AND

## 2025-01-14 NOTE — TELEPHONE ENCOUNTER
Name: Therese Mckeon  : 1960  MRN: [id number]    Oncology Navigation Follow-Up Note    Contact Type:  Telephone    Notes: Upon review of chart noted MRI imaging completed & resulted.  Noted CT imaging completed & preliminary results noted.  Spoke w/[facility], updated on pt, notified Dr. Mirza appt @ 1600, & requested final read on CT imaging.  Spoke w/Linda  IR , notified  imaging completed & inquired on expediting bx.  Lidna stated IR MD updated & await okay to schedule.  Will continue to follow.      Electronically signed by Tanisha Bhatia RN on 2025 at 10:45 AM

## 2025-01-15 ENCOUNTER — TELEPHONE (OUTPATIENT)
Dept: ONCOLOGY | Age: 65
End: 2025-01-15

## 2025-01-15 LAB
ALBUMIN PERCENT: 56 % (ref 45–65)
ALBUMIN SERPL-MCNC: 4.1 G/DL (ref 3.2–5.2)
ALPHA 2 PERCENT: 17 % (ref 6–13)
ALPHA1 GLOB SERPL ELPH-MCNC: 0.3 G/DL (ref 0.1–0.4)
ALPHA1 GLOB SERPL ELPH-MCNC: 5 % (ref 3–6)
ALPHA2 GLOB SERPL ELPH-MCNC: 1.2 G/DL (ref 0.5–0.9)
B-GLOBULIN SERPL ELPH-MCNC: 0.8 G/DL (ref 0.5–1.1)
B-GLOBULIN SERPL ELPH-MCNC: 11 % (ref 11–19)
FREE KAPPA/LAMBDA RATIO: 0.91 (ref 0.22–1.74)
GAMMA GLOB SERPL ELPH-MCNC: 0.8 G/DL (ref 0.5–1.5)
GAMMA GLOBULIN %: 11 % (ref 9–20)
ITYP INTERPRETATION: ABNORMAL
KAPPA LC FREE SER-MCNC: 9.2 MG/L
LAMBDA LC FREE SERPL-MCNC: 10.1 MG/L (ref 4.2–27.7)
PATH REV: ABNORMAL
PATHOLOGIST: ABNORMAL
PROT PATTERN SERPL ELPH-IMP: ABNORMAL
PROT SERPL-MCNC: 7.2 G/DL (ref 6.6–8.7)
TOTAL PROT. SUM,%: 100 % (ref 98–102)
TOTAL PROT. SUM: 7.2 G/DL (ref 6.3–8.2)

## 2025-01-15 NOTE — TELEPHONE ENCOUNTER
Name: Therese Mckeon  : 1960  MRN: [id number]    Oncology Navigation Follow-Up Note    Contact Type:  Telephone    Notes: Dr. Mirza notified bx scheduled  & inquired on f/u.  Dr. Mirza requested dual appt w/Dr. Livingston .  Daniela, [facility] RO , & Mateus LOPEZ, [facility] MO, updated on pt & requested dual appt.  Pt scheduled  for Dr. Livingston consult @ 1400 followed by Dr. Mirza f/u @ 1515.  Pt updated on  dual appt details.  Pt verbalized understanding.  Pt stated scheduled for vacation  & return .  Pt stated spoke w/Dr. Mirza regarding vacation & stated \"I really need this vacation\".  Instructed pt writer will f/u after  appt.  Will continue to follow.      Electronically signed by Tanisha Bhatia RN on 1/15/2025 at 9:51 AM

## 2025-01-15 NOTE — TELEPHONE ENCOUNTER
Name: Therese Mckeon  : 1960  MRN: [id number]    Oncology Navigation Follow-Up Note    Contact Type:  Telephone    Notes: Upon review of chart noted bx scheduled .  Will continue to follow.      Electronically signed by Tanisha Bhatia RN on 1/15/2025 at 8:24 AM

## 2025-01-17 ENCOUNTER — HOSPITAL ENCOUNTER (OUTPATIENT)
Dept: CT IMAGING | Age: 65
Discharge: HOME | End: 2025-01-19
Payer: MEDICAID

## 2025-01-17 VITALS
DIASTOLIC BLOOD PRESSURE: 44 MMHG | RESPIRATION RATE: 16 BRPM | OXYGEN SATURATION: 95 % | TEMPERATURE: 97.2 F | SYSTOLIC BLOOD PRESSURE: 141 MMHG | BODY MASS INDEX: 34.83 KG/M2 | HEIGHT: 64 IN | WEIGHT: 204 LBS | HEART RATE: 60 BPM

## 2025-01-17 DIAGNOSIS — C80.1 MALIGNANT NEOPLASM METASTATIC TO LUMBAR SPINE WITH UNKNOWN PRIMARY SITE (HCC): ICD-10-CM

## 2025-01-17 DIAGNOSIS — C79.51 MALIGNANT NEOPLASM METASTATIC TO LUMBAR SPINE WITH UNKNOWN PRIMARY SITE (HCC): ICD-10-CM

## 2025-01-17 LAB
INR PPP: 0.9
PARTIAL THROMBOPLASTIN TIME: <20 SEC (ref 23–36.5)
PLATELET # BLD AUTO: 233 K/UL (ref 138–453)
PROTHROMBIN TIME: 12.4 SEC (ref 11.7–14.9)

## 2025-01-17 PROCEDURE — 85730 THROMBOPLASTIN TIME PARTIAL: CPT

## 2025-01-17 PROCEDURE — 88307 TISSUE EXAM BY PATHOLOGIST: CPT

## 2025-01-17 PROCEDURE — 88341 IMHCHEM/IMCYTCHM EA ADD ANTB: CPT

## 2025-01-17 PROCEDURE — 88237 TISSUE CULTURE BONE MARROW: CPT

## 2025-01-17 PROCEDURE — 2709999900 CT BIOPSY DEEP BONE PERCUTANEOUS

## 2025-01-17 PROCEDURE — 88342 IMHCHEM/IMCYTCHM 1ST ANTB: CPT

## 2025-01-17 PROCEDURE — 7100000041 HC SPAR PHASE II RECOVERY - ADDTL 15 MIN: Performed by: RADIOLOGY

## 2025-01-17 PROCEDURE — 88311 DECALCIFY TISSUE: CPT

## 2025-01-17 PROCEDURE — 85049 AUTOMATED PLATELET COUNT: CPT

## 2025-01-17 PROCEDURE — 88360 TUMOR IMMUNOHISTOCHEM/MANUAL: CPT

## 2025-01-17 PROCEDURE — 7100000040 HC SPAR PHASE II RECOVERY - FIRST 15 MIN: Performed by: RADIOLOGY

## 2025-01-17 PROCEDURE — 6360000002 HC RX W HCPCS: Performed by: RADIOLOGY

## 2025-01-17 PROCEDURE — 88271 CYTOGENETICS DNA PROBE: CPT

## 2025-01-17 PROCEDURE — 88275 CYTOGENETICS 100-300: CPT

## 2025-01-17 PROCEDURE — 85610 PROTHROMBIN TIME: CPT

## 2025-01-17 RX ORDER — MIDAZOLAM HYDROCHLORIDE 2 MG/2ML
INJECTION, SOLUTION INTRAMUSCULAR; INTRAVENOUS PRN
Status: COMPLETED | OUTPATIENT
Start: 2025-01-17 | End: 2025-01-17

## 2025-01-17 RX ORDER — FENTANYL CITRATE 50 UG/ML
INJECTION, SOLUTION INTRAMUSCULAR; INTRAVENOUS PRN
Status: COMPLETED | OUTPATIENT
Start: 2025-01-17 | End: 2025-01-17

## 2025-01-17 RX ORDER — SODIUM CHLORIDE 9 MG/ML
INJECTION, SOLUTION INTRAVENOUS CONTINUOUS
Status: DISCONTINUED | OUTPATIENT
Start: 2025-01-17 | End: 2025-01-20 | Stop reason: HOSPADM

## 2025-01-17 RX ORDER — ACETAMINOPHEN 325 MG/1
650 TABLET ORAL EVERY 4 HOURS PRN
Status: DISCONTINUED | OUTPATIENT
Start: 2025-01-17 | End: 2025-01-20 | Stop reason: HOSPADM

## 2025-01-17 RX ADMIN — MIDAZOLAM HYDROCHLORIDE 0.5 MG: 1 INJECTION, SOLUTION INTRAMUSCULAR; INTRAVENOUS at 13:31

## 2025-01-17 RX ADMIN — MIDAZOLAM HYDROCHLORIDE 1 MG: 1 INJECTION, SOLUTION INTRAMUSCULAR; INTRAVENOUS at 13:22

## 2025-01-17 RX ADMIN — FENTANYL CITRATE 50 MCG: 50 INJECTION, SOLUTION INTRAMUSCULAR; INTRAVENOUS at 13:31

## 2025-01-17 ASSESSMENT — PAIN - FUNCTIONAL ASSESSMENT
PAIN_FUNCTIONAL_ASSESSMENT: NONE - DENIES PAIN
PAIN_FUNCTIONAL_ASSESSMENT: NONE - DENIES PAIN

## 2025-01-17 NOTE — H&P
History and Physical    Pt Name: Therese Mckeon  MRN: [id number]  YOB: 1960  Date of evaluation: 1/17/2025  Primary Care Physician: Mary Schneider DO    SUBJECTIVE:   History of Chief Complaint:    Therese Mckeon is a 65 y.o. female who is scheduled today for bone biopsy. Patient states she has been having bilateral leg pain and tingling, ongoing since September of last year. She denies any back pain or injuries preceding her symptoms. Patient has a history of smoking.   Allergies  is allergic to amoxicillin er.  Medications  Prior to Admission medications    Medication Sig Start Date End Date Taking? Authorizing Provider   dexAMETHasone (DECADRON) 4 MG tablet Take 1 tablet by mouth 2 times daily (with meals) 1/6/25  Yes Mary Schneider DO   gabapentin (NEURONTIN) 100 MG capsule Take 2 capsules by mouth in the morning and at bedtime for 30 days. May increase to 3 capsules BID after 7 days, if needed. 1/6/25 2/5/25 Yes Mary Schneider DO   levothyroxine (SYNTHROID) 200 MCG tablet Take 1 tab by mouth daily, except 1/2 tab on Sat/Sunday's. 12/27/24  Yes Mary Schneider DO   losartan (COZAAR) 50 MG tablet Take 1 tablet by mouth daily 9/25/24  Yes Mary Schneider DO   ezetimibe (ZETIA) 10 MG tablet Take 1 tablet by mouth daily 5/2/24  Yes Mary Schneider DO   pravastatin (PRAVACHOL) 80 MG tablet Take 1 tablet by mouth daily 3/27/24  Yes Mary Schneider DO   escitalopram (LEXAPRO) 10 MG tablet Take 1 tablet by mouth daily 3/27/24  Yes Mary Schneider DO   calcium carbonate (OSCAL) 500 MG TABS tablet Take 1 tablet by mouth daily   Yes Provider, MD Amaury   Multiple Vitamin (MULTI-VITAMIN DAILY PO) Take  by mouth.   Yes Provider, MD Amaury     Past Medical History    has a past medical history of Hyperlipidemia, Hypertension, Hypothyroidism, Obesity, Smoker, and Spinal cord lesion (HCC).  Past Surgical History   has a past surgical history that includes Thyroidectomy (1980); Dilation and curettage  of uterus (); Partial hysterectomy (2020); Uterus surgery (2020); Cystocele repair (2020); and Colonoscopy.  Social History   reports that she has been smoking cigarettes. She has a 35 pack-year smoking history. She has never used smokeless tobacco.   reports no history of alcohol use.   reports no history of drug use.  Marital Status single  Occupation retired   Family History  Family Status   Relation Name Status    Mother      Father [place]     Sister  Alive    Brother  Alive    MGM      MAunt  (Not Specified)    MAunt  (Not Specified)    PAunt  (Not Specified)    PAunt  (Not Specified)    PAunt  (Not Specified)    Neg Hx  (Not Specified)   No partnership data on file     family history includes Breast Cancer in her sister; Cancer in her father; Diabetes in her father; Heart Attack in her brother, father, and maternal grandmother; Hypertension in her mother; Liver Disease in her mother; No Known Problems in her maternal aunt, maternal aunt, paternal aunt, paternal aunt, and paternal aunt.    Review of Systems:  CONSTITUTIONAL:   negative for fevers, chills, fatigue and malaise    EYES:   negative for double vision, blurred vision and photophobia    HEENT:   negative for tinnitus, epistaxis and sore throat     RESPIRATORY:   negative for cough, shortness of breath, wheezing     CARDIOVASCULAR:   negative for chest pain, palpitations, syncope, edema     GASTROINTESTINAL:   negative for nausea, vomiting     GENITOURINARY:   negative for incontinence     MUSCULOSKELETAL:   negative for neck or back pain bilateral leg pain   NEUROLOGICAL:   Negative for weakness bilateral leg tingling   negative for headaches and dizziness     PSYCHIATRIC:   negative for anxiety       OBJECTIVE:   VITALS:  height is 1.626 m (5' 4\") and weight is 92.5 kg (204 lb). Her temporal temperature is 96.8 °F (36 °C). Her blood pressure is 159/52 (abnormal) and her pulse is 58. Her respiration is 18

## 2025-01-17 NOTE — PRE SEDATION
Sedation Pre-Procedure Note    Patient Name: Therese Mckeon   YOB: 1960  Room/Bed: Room/bed info not found  Medical Record Number: [id number]  Date: 1/17/2025   Time: 1:01 PM       Indication:  Bone bx    Consent: I have discussed with the patient and/or the patient representative the indication, alternatives, and the possible risks and/or complications of the planned procedure and the anesthesia methods. The patient and/or patient representative appear to understand and agree to proceed.    Vital Signs:   Vitals:    01/17/25 1212   BP: (!) 159/52   Pulse: 58   Resp: 18   Temp: 96.8 °F (36 °C)   SpO2: 95%       Past Medical History:   has a past medical history of Hyperlipidemia, Hypertension, Hypothyroidism, Obesity, Smoker, and Spinal cord lesion (HCC).    Past Surgical History:   has a past surgical history that includes Thyroidectomy (1980); Dilation and curettage of uterus (1995); Partial hysterectomy (09/25/2020); Uterus surgery (09/25/2020); Cystocele repair (09/25/2020); and Colonoscopy.    Medications:   Scheduled Meds:    triamcinolone acetonide  80 mg Intra-artICUlar Once    BUPivacaine  5 mL Intra-artICUlar Once     Continuous Infusions:    sodium chloride       PRN Meds:   Home Meds:   Prior to Admission medications    Medication Sig Start Date End Date Taking? Authorizing Provider   dexAMETHasone (DECADRON) 4 MG tablet Take 1 tablet by mouth 2 times daily (with meals) 1/6/25  Yes Mary Schneider DO   gabapentin (NEURONTIN) 100 MG capsule Take 2 capsules by mouth in the morning and at bedtime for 30 days. May increase to 3 capsules BID after 7 days, if needed. 1/6/25 2/5/25 Yes Mary Schneider DO   levothyroxine (SYNTHROID) 200 MCG tablet Take 1 tab by mouth daily, except 1/2 tab on Sat/Sunday's. 12/27/24  Yes Mary Schneider DO   losartan (COZAAR) 50 MG tablet Take 1 tablet by mouth daily 9/25/24  Yes Mary Schneider DO   ezetimibe (ZETIA) 10 MG tablet Take 1 tablet by mouth daily 5/2/24

## 2025-01-17 NOTE — POST SEDATION
Sedation Post Procedure Note    Patient Name: Therese Mckeon   YOB: 1960  Room/Bed: Room/bed info not found  Medical Record Number: [id number]  Date: 1/17/2025   Time: 1:48 PM         Physicians/Assistants: AIME Montero    Procedure Performed:  L4 bone bx    Post-Sedation Vital Signs:  Vitals:    01/17/25 1344   BP: (!) 171/69   Pulse: 59   Resp: 15   Temp:    SpO2:       Vital signs were reviewed and were stable after the procedure (see flow sheet for vitals)            Post-Sedation Exam: Pt remains stable           Complications: none    Electronically signed by AIME Montero on 1/17/2025 at 1:48 PM

## 2025-01-17 NOTE — BRIEF OP NOTE
Brief Postoperative Note    Therese Mckeon  YOB: 1960  [id number]    Pre-operative Diagnosis: L4 bone lesion    Post-operative Diagnosis: Same    Procedure: bone bx    Anesthesia: Local and Moderate Sedation    Surgeons/Assistants: MD Elías    Estimated Blood Loss: Minimal    Complications: None    Specimens: Was Obtained:     Findings: Successful bx of L4 vertebral body.  1 core sample obtained and placed in formalin.    Electronically signed by AIME Montero on 1/17/2025 at 1:47 PM

## 2025-01-20 ENCOUNTER — TELEPHONE (OUTPATIENT)
Dept: ONCOLOGY | Age: 65
End: 2025-01-20

## 2025-01-20 DIAGNOSIS — M79.604 BILATERAL LEG PAIN: ICD-10-CM

## 2025-01-20 DIAGNOSIS — M89.9 LESION OF VERTEBRA: ICD-10-CM

## 2025-01-20 DIAGNOSIS — M54.16 LUMBAR NERVE ROOT COMPRESSION: ICD-10-CM

## 2025-01-20 DIAGNOSIS — M79.605 BILATERAL LEG PAIN: ICD-10-CM

## 2025-01-20 DIAGNOSIS — M54.50 BILATERAL LOW BACK PAIN, UNSPECIFIED CHRONICITY, UNSPECIFIED WHETHER SCIATICA PRESENT: ICD-10-CM

## 2025-01-20 NOTE — TELEPHONE ENCOUNTER
Name: Therese Mckeon  : 1960  MRN: [id number]    Oncology Navigation Follow-Up Note    Contact Type:  Telephone    Notes: Spoke w/[facility], updated on pt's  bx & requested results expedited r/t  oncology appts.  Will continue to follow.      Electronically signed by Tanisha Bhatia RN on 2025 at 8:21 AM

## 2025-01-20 NOTE — TELEPHONE ENCOUNTER
Per [facility], last fill 1/6/25, quantity 120 for 30 days.     Therese called requesting a refill of the below medication which has been pended for you:     Requested Prescriptions     Pending Prescriptions Disp Refills    dexAMETHasone (DECADRON) 4 MG tablet 40 tablet 0     Sig: Take 1 tablet by mouth 2 times daily (with meals)    gabapentin (NEURONTIN) 100 MG capsule 120 capsule 0     Sig: Take 2 capsules by mouth in the morning and at bedtime for 30 days. May increase to 3 capsules BID after 7 days, if needed.       Last Appointment Date: 1/3/2025  Next Appointment Date: 3/25/2025    Allergies   Allergen Reactions    Amoxicillin Er Itching     Makes blood pressure drop

## 2025-01-22 ENCOUNTER — TELEPHONE (OUTPATIENT)
Dept: ONCOLOGY | Age: 65
End: 2025-01-22

## 2025-01-22 ENCOUNTER — OFFICE VISIT (OUTPATIENT)
Dept: ONCOLOGY | Age: 65
End: 2025-01-22
Payer: MEDICAID

## 2025-01-22 ENCOUNTER — HOSPITAL ENCOUNTER (OUTPATIENT)
Dept: RADIATION ONCOLOGY | Age: 65
Discharge: HOME | End: 2025-01-22
Payer: MEDICAID

## 2025-01-22 VITALS
HEART RATE: 72 BPM | BODY MASS INDEX: 35.19 KG/M2 | WEIGHT: 205 LBS | RESPIRATION RATE: 16 BRPM | DIASTOLIC BLOOD PRESSURE: 81 MMHG | TEMPERATURE: 98 F | SYSTOLIC BLOOD PRESSURE: 150 MMHG

## 2025-01-22 DIAGNOSIS — M79.605 BILATERAL LEG PAIN: ICD-10-CM

## 2025-01-22 DIAGNOSIS — C83.398 DIFFUSE LARGE B-CELL LYMPHOMA OF EXTRANODAL SITE EXCLUDING SPLEEN AND OTHER SOLID ORGANS: Primary | ICD-10-CM

## 2025-01-22 DIAGNOSIS — M89.9 LESION OF VERTEBRA: ICD-10-CM

## 2025-01-22 DIAGNOSIS — M54.16 LUMBAR NERVE ROOT COMPRESSION: ICD-10-CM

## 2025-01-22 DIAGNOSIS — C83.398 DIFFUSE LARGE B-CELL LYMPHOMA OF SOLID ORGAN EXCLUDING SPLEEN: Primary | ICD-10-CM

## 2025-01-22 DIAGNOSIS — M54.50 BILATERAL LOW BACK PAIN, UNSPECIFIED CHRONICITY, UNSPECIFIED WHETHER SCIATICA PRESENT: ICD-10-CM

## 2025-01-22 DIAGNOSIS — M79.604 BILATERAL LEG PAIN: ICD-10-CM

## 2025-01-22 DIAGNOSIS — M89.9 BONE LESION: ICD-10-CM

## 2025-01-22 LAB — SURGICAL PATHOLOGY REPORT: NORMAL

## 2025-01-22 PROCEDURE — 99213 OFFICE O/P EST LOW 20 MIN: CPT | Performed by: RADIOLOGY

## 2025-01-22 PROCEDURE — 99215 OFFICE O/P EST HI 40 MIN: CPT | Performed by: INTERNAL MEDICINE

## 2025-01-22 PROCEDURE — 99211 OFF/OP EST MAY X REQ PHY/QHP: CPT | Performed by: INTERNAL MEDICINE

## 2025-01-22 RX ORDER — HYDROCODONE BITARTRATE AND ACETAMINOPHEN 5; 325 MG/1; MG/1
1 TABLET ORAL EVERY 6 HOURS PRN
COMMUNITY
End: 2025-01-22 | Stop reason: SDUPTHER

## 2025-01-22 RX ORDER — GABAPENTIN 100 MG/1
200 CAPSULE ORAL 2 TIMES DAILY
Qty: 120 CAPSULE | Refills: 0 | Status: SHIPPED | OUTPATIENT
Start: 2025-01-22 | End: 2025-02-21

## 2025-01-22 RX ORDER — HYDROCODONE BITARTRATE AND ACETAMINOPHEN 5; 325 MG/1; MG/1
1 TABLET ORAL EVERY 6 HOURS PRN
Qty: 120 TABLET | Refills: 0 | Status: SHIPPED | OUTPATIENT
Start: 2025-01-22 | End: 2025-02-21

## 2025-01-22 RX ORDER — DEXAMETHASONE 4 MG/1
4 TABLET ORAL 2 TIMES DAILY WITH MEALS
Qty: 60 TABLET | Refills: 0 | Status: SHIPPED | OUTPATIENT
Start: 2025-01-22

## 2025-01-22 ASSESSMENT — PAIN SCALES - GENERAL: PAINLEVEL_OUTOF10: 0

## 2025-01-22 NOTE — ACP (ADVANCE CARE PLANNING)
Advance Care Planning     Hospice Services: Patient is not currently receiving hospice services/has not received hospice care within the performance year.    Advance Care Planning was discussed with patient, and she does not currently have ACP documents.  Boyfriend, Manuel, is listed as Decision Maker.

## 2025-01-22 NOTE — PROGRESS NOTES
Referring Physician:  Dr. Hermes Mathews:    01/22/25 1400   BP: (!) 150/81   Pulse: 72   Resp: 16   Temp: 98 °F (36.7 °C)    :     Pain Assessment: 0-10  Pain Level: 0       Wt Readings from Last 1 Encounters:   01/22/25 93 kg (205 lb)        Body mass index is 35.19 kg/m².              Smoking Status:    [x] Smoker - PPD: 1 pack per day   [] Nonsmoker - Quit Date:               [] Never a smoker      No chief complaint on file.       Cancer Staging   No matching staging information was found for the patient.      Prior Radiation Therapy? No   If yes, site treated:   Facility:                             Date:    Concurrent Chemo/radiation? No   If yes, start date:    Pacemaker/Defibrillator/ICD:  No    Do you have any musculoskeletal diseases, Lupus or arthritic conditions?  No   If yes, are you currently taking Methotrexate?  []  Yes  [x]  No                     Current Outpatient Medications   Medication Sig Dispense Refill    HYDROcodone-acetaminophen (NORCO) 5-325 MG per tablet Take 1 tablet by mouth every 6 hours as needed for Pain. Max Daily Amount: 4 tablets      dexAMETHasone (DECADRON) 4 MG tablet Take 1 tablet by mouth 2 times daily (with meals) 40 tablet 0    gabapentin (NEURONTIN) 100 MG capsule Take 2 capsules by mouth in the morning and at bedtime for 30 days. May increase to 3 capsules BID after 7 days, if needed. 120 capsule 0    levothyroxine (SYNTHROID) 200 MCG tablet Take 1 tab by mouth daily, except 1/2 tab on Sat/Sunday's. 90 tablet 0    losartan (COZAAR) 50 MG tablet Take 1 tablet by mouth daily 90 tablet 3    ezetimibe (ZETIA) 10 MG tablet Take 1 tablet by mouth daily 90 tablet 3    pravastatin (PRAVACHOL) 80 MG tablet Take 1 tablet by mouth daily 90 tablet 3    escitalopram (LEXAPRO) 10 MG tablet Take 1 tablet by mouth daily 90 tablet 3    calcium carbonate (OSCAL) 500 MG TABS tablet Take 1 tablet by mouth daily      Multiple Vitamin (MULTI-VITAMIN DAILY PO) Take  by mouth.

## 2025-01-22 NOTE — PROGRESS NOTES
Therese Mckeon                                                                                                                  1/22/2025  MRN:   [id number]  YOB: 1960  PCP:                           Mary Schneider DO  Referring Physician: No ref. provider found  Treating Physician Name: MITUL RICE MD      Reason for visit:  Chief Complaint   Patient presents with    Follow-up     Review status of disease     Results     bx   Reviewed path report    Current problems:  Diffuse large B-cell lymphoma, germinal center type, stage IV  Tobacco dependence  Back pain  Medical comorbidities: Dyslipidemia hypertension hypothyroidism    Active and recent treatments:  CT.  Follow-up on patient testing to rule out double hit diffuse large B-cell lymphoma    Interval history:  Patient presents to the clinic accompanied by her  to discuss results of biopsy report.  The biopsy confirms diffuse large B-cell lymphoma however FISH testing pending to rule out double hit/triple hit lymphoma.  According the patient back pain is controlled.  Neuropathy is manageable.  Patient planning to go on vacation this week denies fever or chills    During this visit patient's allergy, social, medical, surgical history and medications were reviewed and updated.    Summary of Case/History:  Therese Mckeon a 65 y.o.female is a patient with lytic lesions of the spine concerning for metastatic disease presents to the clinic to establish care and for further workup and evaluation.  Patient was initially referred to neurosurgery because of significant back pain.  Patient subsequently had MRI of the lumbar spine which showed lytic process at L4 with soft tissue component extending into the spinal canal.  Patient has been started on Decadron.  Her back pain has improved.  Patient yesterday had a CT chest and pelvis.  No obvious finding suggestive of a primary tumor noted on the CT chest and pelvis.  MRI cervical and

## 2025-01-22 NOTE — TELEPHONE ENCOUNTER
Instructions   from Dr. Lm Mirza MD    Port   Echo   Will set up f/u in deficance     Pt claims they would call Melonie [facility]ED[facility] tomorrow(1/23/2025) to schedule their port/echo  Faxed over pts face sheet to [facility] to have them schedule their follow up there  Faxed to: [phone]    Faxed over referral to General Surgery, to [facility].  Fax # [phone]

## 2025-01-22 NOTE — TELEPHONE ENCOUNTER
Name: Therese Mckeon  : 1960  MRN: [id number]    Oncology Navigation Follow-Up Note    Contact Type:  Medical Oncology    Notes: Pt @ [facility] for dual RO/MO appts.  Met w/pt prior to Dr. Mirza f/u.  Pt denied questions/concerns.  Encouraged to contact writer prn.  Will continue to follow.      Electronically signed by Tanisha Bhatia RN on 2025 at 4:09 PM

## 2025-01-23 ENCOUNTER — TELEPHONE (OUTPATIENT)
Dept: ONCOLOGY | Age: 65
End: 2025-01-23

## 2025-01-23 LAB — MOLECULAR CYTOGENIC FISH: NORMAL

## 2025-01-23 RX ORDER — GABAPENTIN 100 MG/1
200 CAPSULE ORAL 2 TIMES DAILY
Qty: 120 CAPSULE | Refills: 0 | OUTPATIENT
Start: 2025-01-23 | End: 2025-02-22

## 2025-01-23 RX ORDER — DEXAMETHASONE 4 MG/1
4 TABLET ORAL 2 TIMES DAILY WITH MEALS
Qty: 40 TABLET | Refills: 0 | OUTPATIENT
Start: 2025-01-23

## 2025-01-23 NOTE — CONSULTS
[facility]            Radiation Oncology          [address] [place] [address]        O: [phone]        F: [phone]       [facility]             2025    Patient: Therese Mckeon   YOB: 1960       Dear Dr Dobbins:    Thank you for referring Therese Mckeon to me for evaluation. Below are the relevant portions of my assessment and plan of care. If you have questions, please do not hesitate to call me. I look forward to following this patient along with you.     Sincerely,    Electronically signed by Saud Livingston MD on 2025 at 9:23 AM    CC: Patient Care Team:  Mary Schneider DO as PCP - General (Family Medicine)  Mary Schneider DO as PCP - Empaneled Provider  Tanisha Bhatia RN as Nurse Navigator (Oncology)  ------------------------------------------------------------------------------------------------------------------------------------------------------------------------------------------      RADIATION ONCOLOGY NEW PATIENT VISIT:    Date of Service: 2025    Location:  [facility] Radiation Oncology,   [address] [place] [address], [place] [id number] [phone]     Patient ID:   Therese Mckeon  : 1960   MRN: [id number]    CHIEF COMPLAINT: \"***\"    DIAGNOSIS:  Cancer Staging   No matching staging information was found for the patient.      HISTORY OF PRESENT ILLNESS:   Therese Mckeon is a 65 y.o. female with ***      OBGYN HISTORY: (Breast/GYN patients only)   OB History    Para Term  AB Living   4 2 2   2 4   SAB IAB Ectopic Molar Multiple Live Births   2         2      # Outcome Date GA Lbr [place]/2nd Weight Sex Type Anes PTL Lv   4 Term 00 40w0d  3.997 kg (8 lb 13 oz) M Vag-Spont   SOPHIA   3 SAB 95 12w0d          2 SAB 94 11w0d          1 Term 83 40w0d  3.714 kg (8 lb 3 oz) M Vag-Spont   SOPHIA     Patient's last menstrual period was 2014.    menarche

## 2025-01-23 NOTE — TELEPHONE ENCOUNTER
Name: Therese Mckeon  : 1960  MRN: [id number]    Oncology Navigation Follow-Up Note    Contact Type:  Telephone    Notes: Spoke w/Dr. Mirza to inquire on coordinating dual MO/RO appts @ [facility] , Dr. Mirza stated okay & requested MO f/u scheduled prior to RO.  Spoke w/Anna, [facility] , updated on pt & requested appt.  Pt scheduled  @ 1000.  Pt updated, verbalized understanding & denied questions/concerns.  Encouraged to contact writer prn.  Will continue to follow.      Electronically signed by Tanisha Bhatia RN on 2025 at 11:51 AM

## 2025-02-03 ASSESSMENT — ENCOUNTER SYMPTOMS: BACK PAIN: 1

## 2025-02-10 ENCOUNTER — OFFICE VISIT (OUTPATIENT)
Dept: SURGERY | Age: 65
End: 2025-02-10
Payer: MEDICAID

## 2025-02-10 ENCOUNTER — PREP FOR PROCEDURE (OUTPATIENT)
Dept: SURGERY | Age: 65
End: 2025-02-10

## 2025-02-10 VITALS
WEIGHT: 216 LBS | SYSTOLIC BLOOD PRESSURE: 122 MMHG | HEART RATE: 81 BPM | TEMPERATURE: 98.5 F | HEIGHT: 64 IN | BODY MASS INDEX: 36.88 KG/M2 | DIASTOLIC BLOOD PRESSURE: 80 MMHG | OXYGEN SATURATION: 95 %

## 2025-02-10 DIAGNOSIS — C83.398 DIFFUSE LARGE B-CELL LYMPHOMA OF EXTRANODAL SITE: ICD-10-CM

## 2025-02-10 DIAGNOSIS — C85.90 LYMPHOMA, UNSPECIFIED BODY REGION, UNSPECIFIED LYMPHOMA TYPE (HCC): Primary | ICD-10-CM

## 2025-02-10 PROBLEM — C83.30 DIFFUSE LARGE B CELL LYMPHOMA (HCC): Status: ACTIVE | Noted: 2025-02-10

## 2025-02-10 PROCEDURE — 3079F DIAST BP 80-89 MM HG: CPT | Performed by: SURGERY

## 2025-02-10 PROCEDURE — 3074F SYST BP LT 130 MM HG: CPT | Performed by: SURGERY

## 2025-02-10 PROCEDURE — 99205 OFFICE O/P NEW HI 60 MIN: CPT | Performed by: SURGERY

## 2025-02-10 PROCEDURE — 99203 OFFICE O/P NEW LOW 30 MIN: CPT | Performed by: SURGERY

## 2025-02-10 NOTE — PROGRESS NOTES
Patient had  symptoms of ongoing lower back pain radiating down both legs presented to PCP and eventually had an MRI of the L-spine on 2025.  MRI showed a lytic lesion in the L4 vertebral body extending into the epidural space causing cauda equina nerve root compression and was concerning for metastasis.  Patient ended up having a CT chest abdomen pelvis on 2025 which showed the L4 lesion though no other areas of metastases were seen however there was some subtle areas of sclerosis in the bone.  Patient had an MRI of the brain cervical and thoracic spine which showed another metastatic foci at C3 and T7.  Patient had a CT-guided biopsy in 2025 which revealed a diffuse large B-cell lymphoma.  Patient has been referred to neurosurgery who did not recommend any surgery interventions.     Patient is seeing Radiation/ Oncology by Dr Villavicencio and Oncologist, Dr Mirza. FISH testing is still pending. PET scan is scheduled for 25. Follow up with Dr Mirza on 25 and Radiation on 25. Patient states that she will found out on Wednesday when she will starts chemo. States that she may have to do radiation after chemo.    IMAGIN25 CT of ABD/Pelvis/Chest  Impression:  1. Again noted is an ill-defined permeative lesion centered within L4 with a soft tissue component measuring approximately 5.2 x 4.4 cm and extending into the central canal. There is also mild pathologic compression deformity on today's exam. Findings are suspicious for malignancy until proven otherwise. Consider tissue sampling. 2. Subtle 0.9 cm ovoid lucency in the left lower aspect of L5 could reflect a new lesion as well. 3. Subtle area of sclerosis in the medial left clavicle with subtle heterogeneous appearance of the manubrium, some of which may be related to prior trauma. Metastatic disease cannot be excluded given findings within the lumbar spine. 4. Trace left pleural effusion. Small 
May increase to 3 capsules BID after 7 days, if needed. 120 capsule 0    dexAMETHasone (DECADRON) 4 MG tablet Take 1 tablet by mouth 2 times daily (with meals) 60 tablet 0    HYDROcodone-acetaminophen (NORCO) 5-325 MG per tablet Take 1 tablet by mouth every 6 hours as needed for Pain for up to 30 days. Max Daily Amount: 4 tablets 120 tablet 0    levothyroxine (SYNTHROID) 200 MCG tablet Take 1 tab by mouth daily, except 1/2 tab on Sat/Sunday's. 90 tablet 0    losartan (COZAAR) 50 MG tablet Take 1 tablet by mouth daily 90 tablet 3    ezetimibe (ZETIA) 10 MG tablet Take 1 tablet by mouth daily 90 tablet 3    pravastatin (PRAVACHOL) 80 MG tablet Take 1 tablet by mouth daily 90 tablet 3    escitalopram (LEXAPRO) 10 MG tablet Take 1 tablet by mouth daily 90 tablet 3    calcium carbonate (OSCAL) 500 MG TABS tablet Take 1 tablet by mouth daily      Multiple Vitamin (MULTI-VITAMIN DAILY PO) Take  by mouth.       Current Facility-Administered Medications on File Prior to Visit   Medication Dose Route Frequency Provider Last Rate Last Admin    triamcinolone acetonide (KENALOG-40) injection 80 mg  80 mg Intra-artICUlar Once Venus Fierro APRN - CNP        BUPivacaine (MARCAINE) 0.5 % injection 25 mg  5 mL Intra-artICUlar Once Venus Fierro APRN - CNP         Allergies as of 02/10/2025 - Fully Reviewed 01/22/2025   Allergen Reaction Noted    Amoxicillin er Itching 02/20/2014         PHYSICAL EXAM:    Blood pressure 122/80, pulse 81, temperature 98.5 °F (36.9 °C), temperature source Tympanic, height 1.626 m (5' 4\"), weight 98 kg (216 lb), last menstrual period 09/21/2014, SpO2 95%, not currently breastfeeding.    Gen:  A and O x 3, NAD, well nourished  Eyes:  Sclera non icterus, PERRL  Head:  Normocephalic, non-tender  Neck:  Supple, no adenopathy, thyroid non tender and no masses,no carotid bruits  Lungs:  CTA, symmetrical  Chest:  RRR, no murmurs  Abd:  Soft, NT, ND, no HSM, no hernias, no bruits  Ext:  No

## 2025-02-11 ENCOUNTER — HOSPITAL ENCOUNTER (OUTPATIENT)
Age: 65
Discharge: HOME | End: 2025-02-13
Attending: INTERNAL MEDICINE
Payer: MEDICAID

## 2025-02-11 ENCOUNTER — HOSPITAL ENCOUNTER (OUTPATIENT)
Dept: NUCLEAR MEDICINE | Age: 65
Discharge: HOME | End: 2025-02-13
Attending: RADIOLOGY
Payer: MEDICAID

## 2025-02-11 VITALS
HEART RATE: 70 BPM | HEIGHT: 64 IN | SYSTOLIC BLOOD PRESSURE: 155 MMHG | DIASTOLIC BLOOD PRESSURE: 75 MMHG | BODY MASS INDEX: 36.88 KG/M2 | WEIGHT: 216 LBS

## 2025-02-11 DIAGNOSIS — C83.398 DIFFUSE LARGE B-CELL LYMPHOMA OF SOLID ORGAN EXCLUDING SPLEEN: ICD-10-CM

## 2025-02-11 DIAGNOSIS — C83.398 DIFFUSE LARGE B-CELL LYMPHOMA OF EXTRANODAL SITE EXCLUDING SPLEEN AND OTHER SOLID ORGANS: ICD-10-CM

## 2025-02-11 LAB
ECHO AO ROOT DIAM: 2.5 CM
ECHO AO ROOT INDEX: 1.24 CM/M2
ECHO AV AREA PEAK VELOCITY: 1.8 CM2
ECHO AV AREA VTI: 2 CM2
ECHO AV AREA/BSA PEAK VELOCITY: 0.9 CM2/M2
ECHO AV AREA/BSA VTI: 1 CM2/M2
ECHO AV MEAN GRADIENT: 6 MMHG
ECHO AV MEAN VELOCITY: 1.2 M/S
ECHO AV PEAK GRADIENT: 13 MMHG
ECHO AV PEAK VELOCITY: 1.8 M/S
ECHO AV VELOCITY RATIO: 0.72
ECHO AV VTI: 38.5 CM
ECHO BSA: 2.1 M2
ECHO EST RA PRESSURE: 3 MMHG
ECHO LA AREA 4C: 19.8 CM2
ECHO LA DIAMETER INDEX: 1.93 CM/M2
ECHO LA DIAMETER: 3.9 CM
ECHO LA MAJOR AXIS: 5.7 CM
ECHO LA TO AORTIC ROOT RATIO: 1.56
ECHO LA VOL MOD A4C: 56 ML (ref 22–52)
ECHO LA VOLUME INDEX MOD A4C: 28 ML/M2 (ref 16–34)
ECHO LV E' LATERAL VELOCITY: 7.4 CM/S
ECHO LV E' SEPTAL VELOCITY: 7.62 CM/S
ECHO LV EDV A2C: 66 ML
ECHO LV EDV A4C: 61 ML
ECHO LV EDV INDEX A4C: 30 ML/M2
ECHO LV EDV NDEX A2C: 33 ML/M2
ECHO LV EJECTION FRACTION A2C: 67 %
ECHO LV EJECTION FRACTION A4C: 64 %
ECHO LV EJECTION FRACTION BIPLANE: 65 % (ref 55–100)
ECHO LV ESV A2C: 21 ML
ECHO LV ESV A4C: 22 ML
ECHO LV ESV INDEX A2C: 10 ML/M2
ECHO LV ESV INDEX A4C: 11 ML/M2
ECHO LV FRACTIONAL SHORTENING: 43 % (ref 28–44)
ECHO LV GLOBAL LONGITUDINAL STRAIN (GLS): -21 %
ECHO LV INTERNAL DIMENSION DIASTOLE INDEX: 2.52 CM/M2
ECHO LV INTERNAL DIMENSION DIASTOLIC: 5.1 CM (ref 3.9–5.3)
ECHO LV INTERNAL DIMENSION SYSTOLIC INDEX: 1.44 CM/M2
ECHO LV INTERNAL DIMENSION SYSTOLIC: 2.9 CM
ECHO LV ISOVOLUMETRIC RELAXATION TIME (IVRT): 87 MS
ECHO LV IVSD: 1.1 CM (ref 0.6–0.9)
ECHO LV MASS 2D: 241.2 G (ref 67–162)
ECHO LV MASS INDEX 2D: 119.4 G/M2 (ref 43–95)
ECHO LV POSTERIOR WALL DIASTOLIC: 1.3 CM (ref 0.6–0.9)
ECHO LV RELATIVE WALL THICKNESS RATIO: 0.51
ECHO LVOT AREA: 2.5 CM2
ECHO LVOT AV VTI INDEX: 0.77
ECHO LVOT DIAM: 1.8 CM
ECHO LVOT MEAN GRADIENT: 4 MMHG
ECHO LVOT PEAK GRADIENT: 7 MMHG
ECHO LVOT PEAK VELOCITY: 1.3 M/S
ECHO LVOT STROKE VOLUME INDEX: 37.4 ML/M2
ECHO LVOT SV: 75.5 ML
ECHO LVOT VTI: 29.7 CM
ECHO MV A VELOCITY: 1.18 M/S
ECHO MV AREA VTI: 1.9 CM2
ECHO MV E DECELERATION TIME (DT): 177 MS
ECHO MV E VELOCITY: 1.22 M/S
ECHO MV E/A RATIO: 1.03
ECHO MV E/E' LATERAL: 16.49
ECHO MV E/E' RATIO (AVERAGED): 16.25
ECHO MV E/E' SEPTAL: 16.01
ECHO MV LVOT VTI INDEX: 1.34
ECHO MV MAX VELOCITY: 1.2 M/S
ECHO MV MEAN GRADIENT: 3 MMHG
ECHO MV MEAN VELOCITY: 0.8 M/S
ECHO MV PEAK GRADIENT: 6 MMHG
ECHO MV VTI: 39.8 CM
ECHO PV MAX VELOCITY: 1.1 M/S
ECHO PV PEAK GRADIENT: 4 MMHG
ECHO RIGHT VENTRICULAR SYSTOLIC PRESSURE (RVSP): 30 MMHG
ECHO RV FREE WALL PEAK S': 12.3 CM/S
ECHO RV INTERNAL DIMENSION: 3.3 CM
ECHO RV TAPSE: 2.2 CM (ref 1.7–?)
ECHO TV PEAK GRADIENT: 2 MMHG
ECHO TV REGURGITANT MAX VELOCITY: 2.59 M/S
ECHO TV REGURGITANT PEAK GRADIENT: 27 MMHG
ECHO TV REGURGITANT PEAK GRADIENT: 27 MMHG
GLUCOSE BLD-MCNC: 94 MG/DL (ref 65–105)

## 2025-02-11 PROCEDURE — A9552 F18 FDG: HCPCS | Performed by: RADIOLOGY

## 2025-02-11 PROCEDURE — A9609 HC RX DIAGNOSTIC RADIOPHARMACEUTICAL: HCPCS | Performed by: RADIOLOGY

## 2025-02-11 PROCEDURE — 2500000003 HC RX 250 WO HCPCS: Performed by: RADIOLOGY

## 2025-02-11 PROCEDURE — 82947 ASSAY GLUCOSE BLOOD QUANT: CPT

## 2025-02-11 PROCEDURE — 3430000000 HC RX DIAGNOSTIC RADIOPHARMACEUTICAL: Performed by: RADIOLOGY

## 2025-02-11 PROCEDURE — 78815 PET IMAGE W/CT SKULL-THIGH: CPT

## 2025-02-11 PROCEDURE — 93306 TTE W/DOPPLER COMPLETE: CPT

## 2025-02-11 RX ORDER — SODIUM CHLORIDE 0.9 % (FLUSH) 0.9 %
10 SYRINGE (ML) INJECTION PRN
Status: DISCONTINUED | OUTPATIENT
Start: 2025-02-11 | End: 2025-02-14 | Stop reason: HOSPADM

## 2025-02-11 RX ORDER — FLUDEOXYGLUCOSE F 18 200 MCI/ML
10 INJECTION, SOLUTION INTRAVENOUS
Status: COMPLETED | OUTPATIENT
Start: 2025-02-11 | End: 2025-02-11

## 2025-02-11 RX ADMIN — SODIUM CHLORIDE, PRESERVATIVE FREE 10 ML: 5 INJECTION INTRAVENOUS at 12:24

## 2025-02-11 RX ADMIN — FLUDEOXYGLUCOSE F 18 12.45 MILLICURIE: 200 INJECTION, SOLUTION INTRAVENOUS at 12:21

## 2025-02-11 RX ADMIN — SODIUM CHLORIDE, PRESERVATIVE FREE 10 ML: 5 INJECTION INTRAVENOUS at 12:21

## 2025-02-12 ENCOUNTER — HOSPITAL ENCOUNTER (OUTPATIENT)
Dept: RADIATION ONCOLOGY | Age: 65
Discharge: HOME | End: 2025-02-12
Payer: MEDICAID

## 2025-02-12 ENCOUNTER — OFFICE VISIT (OUTPATIENT)
Dept: ONCOLOGY | Age: 65
End: 2025-02-12
Payer: MEDICAID

## 2025-02-12 ENCOUNTER — TELEPHONE (OUTPATIENT)
Dept: ONCOLOGY | Age: 65
End: 2025-02-12

## 2025-02-12 VITALS
WEIGHT: 220.3 LBS | HEART RATE: 96 BPM | SYSTOLIC BLOOD PRESSURE: 134 MMHG | BODY MASS INDEX: 37.61 KG/M2 | HEIGHT: 64 IN | TEMPERATURE: 96 F | DIASTOLIC BLOOD PRESSURE: 76 MMHG | RESPIRATION RATE: 16 BRPM

## 2025-02-12 VITALS
DIASTOLIC BLOOD PRESSURE: 76 MMHG | BODY MASS INDEX: 37.81 KG/M2 | WEIGHT: 220.3 LBS | TEMPERATURE: 96 F | SYSTOLIC BLOOD PRESSURE: 134 MMHG | RESPIRATION RATE: 16 BRPM | HEART RATE: 96 BPM

## 2025-02-12 DIAGNOSIS — M79.2 NEUROPATHIC PAIN: ICD-10-CM

## 2025-02-12 DIAGNOSIS — C83.30 DIFFUSE LARGE B-CELL LYMPHOMA, UNSPECIFIED BODY REGION (HCC): Primary | ICD-10-CM

## 2025-02-12 PROCEDURE — 99212 OFFICE O/P EST SF 10 MIN: CPT | Performed by: RADIOLOGY

## 2025-02-12 PROCEDURE — 99211 OFF/OP EST MAY X REQ PHY/QHP: CPT | Performed by: INTERNAL MEDICINE

## 2025-02-12 RX ORDER — FUROSEMIDE 20 MG/1
20 TABLET ORAL DAILY
Qty: 60 TABLET | Refills: 3 | Status: SHIPPED | OUTPATIENT
Start: 2025-02-12

## 2025-02-12 ASSESSMENT — PAIN SCALES - GENERAL: PAINLEVEL_OUTOF10: 0

## 2025-02-12 NOTE — TELEPHONE ENCOUNTER
Name: Therese Mckeon  : 1960  MRN: [id number]    Oncology Navigation Follow-Up Note    Contact Type:  Telephone    Notes: Upon review of chart noted pt completed Dr. Mirza & Dr. Livingston f/u's.  Noted Dr. Mirza placed referral to  IR for bone marrow bx for molecular testing.  Spoke w/Linda  IR , updated on new order & requested appt expedited r/t need molecular testing.  Linda stated will contact pt to schedule.  Will continue to follow.      Electronically signed by Tanisha Bhatia RN on 2025 at 2:08 PM

## 2025-02-13 ENCOUNTER — TELEPHONE (OUTPATIENT)
Dept: ONCOLOGY | Age: 65
End: 2025-02-13

## 2025-02-13 NOTE — TELEPHONE ENCOUNTER
Name: Therese Mckeon  : 1960  MRN: [id number]    Oncology Navigation Follow-Up Note    Notes:   Writer receives hand off from Tanisha HEREDIA RN navigator. Chart reviewed.  Writer will follow, monitor for IR bone marrow bx appt and schedule MO follow up in [place] after biopsy.  ONN updated in Care Team.        Electronically signed by Yelena Galvan RN on 2025 at 9:47 AM

## 2025-02-13 NOTE — TELEPHONE ENCOUNTER
Name: Therese Mckeon  : 1960  MRN: [id number]    Oncology Navigation Follow-Up Note    Contact Type:  Telephone    Notes: Spoke w/pt for f/u call.  Notified writer spoke w/LUIS Mckeon IR , yesterday & requested BMB expedited.  Notified pt writer will transfer oncology navigation to Yelena [facility] navigator.  Notified pt Yelena will f/u w/[facility] appt & coordinating Dr. Mirza f/u @ [facility] to discuss results/tx plan.  Yelena's contact # given to pt.  Pt verbalized understanding & denied questions/concerns.        Electronically signed by Tanisha Bhatia RN on 2025 at 9:17 AM

## 2025-02-13 NOTE — PROGRESS NOTES
Higher High Risk 1.  Place patient in easily observable treatment room  2.  Patient attended at all times by family member or staff  3.  Provide assistance as indicated for ambulation activities  4.  Reorient confused/cognitively impaired patient  5.  Chair/bed in low position, stretcher/bed with siderails up except when performing patient care activities  6.  Educate patient/family/caregiver on falls prevention         PLAN: Patient is seen today in follow up.  She reports significant weight gain and swelling and states she was told to cut her Dexamethasone dose in half and has new script for Lasix. She denies back pain or numbness/tingling/neurological symptoms.    Dr. Livingston reviewing results of recent PET scan with patient.  Pt states she is getting bone marrow biopsy and eventually chemotherapy.  Per Dr. Livingston, pt can follow up as needed here.          Kristy Donnelly RN           
on  today's exam.  Findings are suspicious for malignancy until proven otherwise.  Consider tissue sampling.  2. Subtle 0.9 cm ovoid lucency in the left lower aspect of L5 could reflect a  new lesion as well.  3. Subtle area of sclerosis in the medial left clavicle with subtle  heterogeneous appearance of the manubrium, some of which may be related to  prior trauma.  Metastatic disease cannot be excluded given findings within  the lumbar spine.  4. Trace left pleural effusion.  Small subpleural/pleural based nodules  within the left lower lobe possibly areas of dependent subsegmental  atelectasis.  Attention on follow-up exams is recommended.  A malignant  pleural effusion is not entirely excluded in the appropriate clinical setting.  5. Otherwise, no other acute findings in the chest, abdomen or pelvis.  No  obvious primary neoplasm is identified.     1/2/25 MRI L spine  IMPRESSION:  1. Lytic lesion involving the L4 vertebral body with a large soft tissue  component extending into the epidural space. There is severe compression of  the cauda equina nerve roots and extension of soft tissue into the foraminal  and para foraminal region. Findings are concerning for metastatic disease.  Causing cauda equina compression.  Neuro surgical consult and further  evaluation with contrast-enhanced MRI is recommended.  2. Degenerative changes at L2-L3, L3-L4 and L5-S1 as described above.     PATHOLOGY:  1/17/25 L4 Biopsy  Final Diagnosis  L4 BONE, BIOPSY:  -AGGRESSIVE B-CELL LYMPHOMA.  SEE COMMENT    Diagnosis Comment  Overall, the features are most consistent with germinal center  phenotype diffuse large B-cell lymphoma (per Mark algorithm).  However, double hit lymphoma-FISH panel is pending to rule out the  possibility of a double- hit lymphoma.  Case is seen in consultation with RDD and CRUZ SYED, who concur with the  diagnosis.   Results          Assessment & Plan    Therese Mckeon is a 65 y.o. female with a newly diagnosed

## 2025-02-18 NOTE — H&P
Therese Mckeon is a 65 y.o. female        CC:     Lymphoma newly dx  Needs port     HISTORY OF PRESENT ILLNESS:     Pt is a 66 yo F with recent dx of lymphoma L spine.  Here for evaluation for port placement.     Patient had  symptoms of ongoing lower back pain radiating down both legs presented to PCP and eventually had an MRI of the L-spine on 2025.  MRI showed a lytic lesion in the L4 vertebral body extending into the epidural space causing cauda equina nerve root compression and was concerning for metastasis.  Patient ended up having a CT chest abdomen pelvis on 2025 which showed the L4 lesion though no other areas of metastases were seen however there was some subtle areas of sclerosis in the bone.  Patient had an MRI of the brain cervical and thoracic spine which showed another metastatic foci at C3 and T7.  Patient had a CT-guided biopsy in 2025 which revealed a diffuse large B-cell lymphoma.  Patient has been referred to neurosurgery who did not recommend any surgery interventions.      Patient is seeing Radiation/ Oncology by Dr Villavicencio and Oncologist, Dr Mirza. FISH testing is still pending. PET scan is scheduled for 25. Follow up with Dr Mirza on 25 and Radiation on 25. Patient states that she will found out on Wednesday when she will starts chemo. States that she may have to do radiation after chemo.     IMAGIN25 CT of ABD/Pelvis/Chest  Impression:  1. Again noted is an ill-defined permeative lesion centered within L4 with a soft tissue component measuring approximately 5.2 x 4.4 cm and extending into the central canal. There is also mild pathologic compression deformity on today's exam. Findings are suspicious for malignancy until proven otherwise. Consider tissue sampling. 2. Subtle 0.9 cm ovoid lucency in the left lower aspect of L5 could reflect a new lesion as well. 3. Subtle area of sclerosis in the medial left clavicle with subtle

## 2025-02-19 ENCOUNTER — ANESTHESIA EVENT (OUTPATIENT)
Dept: OPERATING ROOM | Age: 65
End: 2025-02-19
Payer: MEDICAID

## 2025-02-19 ENCOUNTER — APPOINTMENT (OUTPATIENT)
Dept: GENERAL RADIOLOGY | Age: 65
End: 2025-02-19
Attending: SURGERY
Payer: MEDICAID

## 2025-02-19 ENCOUNTER — ANESTHESIA (OUTPATIENT)
Dept: OPERATING ROOM | Age: 65
End: 2025-02-19
Payer: MEDICAID

## 2025-02-19 ENCOUNTER — HOSPITAL ENCOUNTER (OUTPATIENT)
Age: 65
Setting detail: OUTPATIENT SURGERY
Discharge: HOME | End: 2025-02-19
Attending: SURGERY | Admitting: SURGERY
Payer: MEDICAID

## 2025-02-19 VITALS
HEART RATE: 77 BPM | RESPIRATION RATE: 14 BRPM | WEIGHT: 212.4 LBS | DIASTOLIC BLOOD PRESSURE: 83 MMHG | BODY MASS INDEX: 36.26 KG/M2 | TEMPERATURE: 97 F | HEIGHT: 64 IN | OXYGEN SATURATION: 98 % | SYSTOLIC BLOOD PRESSURE: 141 MMHG

## 2025-02-19 PROBLEM — C85.90 LYMPHOMA (HCC): Status: ACTIVE | Noted: 2025-02-19

## 2025-02-19 PROCEDURE — 7100000011 HC PHASE II RECOVERY - ADDTL 15 MIN: Performed by: SURGERY

## 2025-02-19 PROCEDURE — 3700000001 HC ADD 15 MINUTES (ANESTHESIA): Performed by: SURGERY

## 2025-02-19 PROCEDURE — 2709999900 HC NON-CHARGEABLE SUPPLY: Performed by: SURGERY

## 2025-02-19 PROCEDURE — 2580000003 HC RX 258: Performed by: PHYSICIAN ASSISTANT

## 2025-02-19 PROCEDURE — 36561 INSERT TUNNELED CV CATH: CPT | Performed by: SURGERY

## 2025-02-19 PROCEDURE — 3600000012 HC SURGERY LEVEL 2 ADDTL 15MIN: Performed by: SURGERY

## 2025-02-19 PROCEDURE — 3700000000 HC ANESTHESIA ATTENDED CARE: Performed by: SURGERY

## 2025-02-19 PROCEDURE — C1778 LEAD, NEUROSTIMULATOR: HCPCS | Performed by: SURGERY

## 2025-02-19 PROCEDURE — 71045 X-RAY EXAM CHEST 1 VIEW: CPT

## 2025-02-19 PROCEDURE — 2500000003 HC RX 250 WO HCPCS: Performed by: NURSE ANESTHETIST, CERTIFIED REGISTERED

## 2025-02-19 PROCEDURE — 6360000002 HC RX W HCPCS: Performed by: PHYSICIAN ASSISTANT

## 2025-02-19 PROCEDURE — 6360000004 HC RX CONTRAST MEDICATION: Performed by: SURGERY

## 2025-02-19 PROCEDURE — C1894 INTRO/SHEATH, NON-LASER: HCPCS | Performed by: SURGERY

## 2025-02-19 PROCEDURE — 7100000010 HC PHASE II RECOVERY - FIRST 15 MIN: Performed by: SURGERY

## 2025-02-19 PROCEDURE — 6360000002 HC RX W HCPCS: Performed by: NURSE ANESTHETIST, CERTIFIED REGISTERED

## 2025-02-19 PROCEDURE — 6360000002 HC RX W HCPCS: Performed by: SURGERY

## 2025-02-19 PROCEDURE — 3600000002 HC SURGERY LEVEL 2 BASE: Performed by: SURGERY

## 2025-02-19 DEVICE — POWERPORT ISP M.R.I. IMPLANTABLE PORT WITH ATTACHABLE 9.6F OPEN-ENDED SINGLE-LUMEN VENOUS CATHETER INTERMEDIATE KIT (WITH SUTURE PLUGS)
Type: IMPLANTABLE DEVICE | Site: CHEST | Status: FUNCTIONAL
Brand: POWERPORT M.R.I.

## 2025-02-19 RX ORDER — ONDANSETRON 2 MG/ML
INJECTION INTRAMUSCULAR; INTRAVENOUS
Status: DISCONTINUED | OUTPATIENT
Start: 2025-02-19 | End: 2025-02-19 | Stop reason: SDUPTHER

## 2025-02-19 RX ORDER — SODIUM CHLORIDE 0.9 % (FLUSH) 0.9 %
5-40 SYRINGE (ML) INJECTION EVERY 12 HOURS SCHEDULED
Status: DISCONTINUED | OUTPATIENT
Start: 2025-02-19 | End: 2025-02-19 | Stop reason: HOSPADM

## 2025-02-19 RX ORDER — HEPARIN SODIUM 5000 [USP'U]/ML
INJECTION, SOLUTION INTRAVENOUS; SUBCUTANEOUS PRN
Status: DISCONTINUED | OUTPATIENT
Start: 2025-02-19 | End: 2025-02-19 | Stop reason: ALTCHOICE

## 2025-02-19 RX ORDER — SODIUM CHLORIDE, SODIUM LACTATE, POTASSIUM CHLORIDE, CALCIUM CHLORIDE 600; 310; 30; 20 MG/100ML; MG/100ML; MG/100ML; MG/100ML
INJECTION, SOLUTION INTRAVENOUS CONTINUOUS
Status: DISCONTINUED | OUTPATIENT
Start: 2025-02-19 | End: 2025-02-19 | Stop reason: HOSPADM

## 2025-02-19 RX ORDER — PHENYLEPHRINE HYDROCHLORIDE 10 MG/ML
INJECTION INTRAVENOUS
Status: DISCONTINUED | OUTPATIENT
Start: 2025-02-19 | End: 2025-02-19 | Stop reason: SDUPTHER

## 2025-02-19 RX ORDER — PROPOFOL 10 MG/ML
INJECTION, EMULSION INTRAVENOUS
Status: DISCONTINUED | OUTPATIENT
Start: 2025-02-19 | End: 2025-02-19 | Stop reason: SDUPTHER

## 2025-02-19 RX ORDER — SODIUM CHLORIDE 0.9 % (FLUSH) 0.9 %
5-40 SYRINGE (ML) INJECTION PRN
Status: DISCONTINUED | OUTPATIENT
Start: 2025-02-19 | End: 2025-02-19 | Stop reason: HOSPADM

## 2025-02-19 RX ORDER — SODIUM CHLORIDE 9 MG/ML
INJECTION, SOLUTION INTRAVENOUS PRN
Status: DISCONTINUED | OUTPATIENT
Start: 2025-02-19 | End: 2025-02-19 | Stop reason: HOSPADM

## 2025-02-19 RX ORDER — DEXMEDETOMIDINE HYDROCHLORIDE 100 UG/ML
INJECTION, SOLUTION INTRAVENOUS
Status: DISCONTINUED | OUTPATIENT
Start: 2025-02-19 | End: 2025-02-19 | Stop reason: SDUPTHER

## 2025-02-19 RX ORDER — LIDOCAINE HYDROCHLORIDE 10 MG/ML
INJECTION, SOLUTION EPIDURAL; INFILTRATION; INTRACAUDAL; PERINEURAL
Status: DISCONTINUED | OUTPATIENT
Start: 2025-02-19 | End: 2025-02-19 | Stop reason: SDUPTHER

## 2025-02-19 RX ADMIN — PHENYLEPHRINE HYDROCHLORIDE 100 MCG: 10 INJECTION INTRAVENOUS at 07:59

## 2025-02-19 RX ADMIN — PHENYLEPHRINE HYDROCHLORIDE 100 MCG: 10 INJECTION INTRAVENOUS at 07:50

## 2025-02-19 RX ADMIN — LIDOCAINE HYDROCHLORIDE 50 MG: 10 INJECTION, SOLUTION EPIDURAL; INFILTRATION; INTRACAUDAL; PERINEURAL at 07:36

## 2025-02-19 RX ADMIN — DEXMEDETOMIDINE HYDROCHLORIDE 20 MCG: 100 INJECTION, SOLUTION INTRAVENOUS at 07:36

## 2025-02-19 RX ADMIN — ONDANSETRON 4 MG: 2 INJECTION INTRAMUSCULAR; INTRAVENOUS at 08:10

## 2025-02-19 RX ADMIN — Medication 2000 MG: at 07:38

## 2025-02-19 RX ADMIN — PROPOFOL 150 MCG/KG/MIN: 10 INJECTION, EMULSION INTRAVENOUS at 07:38

## 2025-02-19 RX ADMIN — PHENYLEPHRINE HYDROCHLORIDE 100 MCG: 10 INJECTION INTRAVENOUS at 07:41

## 2025-02-19 RX ADMIN — PROPOFOL 200 MG: 10 INJECTION, EMULSION INTRAVENOUS at 07:36

## 2025-02-19 RX ADMIN — SODIUM CHLORIDE, POTASSIUM CHLORIDE, SODIUM LACTATE AND CALCIUM CHLORIDE: 600; 310; 30; 20 INJECTION, SOLUTION INTRAVENOUS at 07:08

## 2025-02-19 ASSESSMENT — PAIN DESCRIPTION - DESCRIPTORS: DESCRIPTORS: ACHING

## 2025-02-19 ASSESSMENT — PAIN - FUNCTIONAL ASSESSMENT: PAIN_FUNCTIONAL_ASSESSMENT: 0-10

## 2025-02-19 NOTE — ANESTHESIA POSTPROCEDURE EVALUATION
Department of Anesthesiology  Postprocedure Note    Patient: Therese Mckeon  MRN: [id number]  YOB: 1960  Date of evaluation: 2/19/2025    Procedure Summary       Date: 02/19/25 Room / Location: [address]    Anesthesia Start: 0730 Anesthesia Stop: 0838    Procedure: Right Port insertion (Right: Chest) Diagnosis:       Diffuse large B-cell lymphoma of extranodal site      Diffuse large B cell lymphoma (HCC)      (Diffuse large B-cell lymphoma of extranodal site [C83.398])      (Diffuse large B cell lymphoma (HCC) [C83.30])    Surgeons: Momo Urias MD Responsible Provider: Leonard Ramos APRN - CRNA    Anesthesia Type: General, TIVA ASA Status: 3            Anesthesia Type: General, TIVA    Roberta Phase I: Roberta Score: 10    Roberta Phase II:      Anesthesia Post Evaluation    Patient location during evaluation: bedside  Level of consciousness: sleepy but conscious  Airway patency: patent  Nausea & Vomiting: no nausea and no vomiting  Cardiovascular status: hemodynamically stable  Respiratory status: spontaneous ventilation  Hydration status: stable  Pain management: satisfactory to patient    No notable events documented.

## 2025-02-19 NOTE — OP NOTE
OP NOTE:  PLACEMENT OF MEDIPORT      Pre op diagnosis:  1.  lymphoma    Post op diagnosis:  Same    Operation:  Insertion of a Power \"Smart\" Port into the right subclavian                     Vein.    Surgeon:  Dr. Ashton    Anesthesia:  IV sedation with local using 1%lido with epi and .25% marcaine    EBL:  Minimal        Procedure:    Patient was taken to the OR and placed in a slight reverse trendelenburg position.  The right infraclavicular area was prepped and draped in the usual sterile fashion.  The subclavian Vein was entered and a guide wire was inserted into the subclavian vein via the sterile Seldinger technique.  The wire was verified to be in correct position via fluoroscopy.  Then a pocket was made inferior to the wire insertion site.  Then the port was measured to the \"angle of Krunal\".  Then the dilator and sheath were threaded over the guide wire.  The guide wire and the dilator were removed.  The catheter was then inserted through the sheath without difficulty.  The port had good blood return when accessed.  Then we visualized the tip of the catheter under fluoroscopy with contrast.  This showed good position at the junction of the SVC and the right atrium.  The port was then sutured to the pectoralis fascia with 2 3-0 vicryl sutures.  Then the port was flushed with a heparinized solution of 1000 units per 1 cc, with a total of 4 cc.  The incision was closed in layered fashion with 3-0 and 4-0 vicryl.  Steri strips were applied.  A sterile dressing was applied and patient was taken back to recovery in stable condition.  A STAT PCXR was ordered in post op, to rule out pneumothorax and verify good placement.            TONE ASHTON MD

## 2025-02-19 NOTE — ANESTHESIA PRE PROCEDURE
Department of Anesthesiology  Preprocedure Note       Name:  Therese Mckeon   Age:  65 y.o.  :  1960                                          MRN:  [id number]         Date:  2025      Surgeon: Surgeon(s):  Momo Urias MD    Procedure: Procedure(s):  Right Port insertion    Medications prior to admission:   Prior to Admission medications    Medication Sig Start Date End Date Taking? Authorizing Provider   furosemide (LASIX) 20 MG tablet Take 1 tablet by mouth daily 25  Yes Lm Mirza MD   gabapentin (NEURONTIN) 100 MG capsule Take 2 capsules by mouth in the morning and at bedtime for 30 days. May increase to 3 capsules BID after 7 days, if needed. 25 Yes Lm Mirza MD   dexAMETHasone (DECADRON) 4 MG tablet Take 1 tablet by mouth 2 times daily (with meals)  Patient taking differently: Take 1 tablet by mouth 2 times daily (with meals) 25-Taking 2 mg twice per day 25  Yes Lm Mirza MD   HYDROcodone-acetaminophen (NORCO) 5-325 MG per tablet Take 1 tablet by mouth every 6 hours as needed for Pain for up to 30 days. Max Daily Amount: 4 tablets 25 Yes Lm Mirza MD   levothyroxine (SYNTHROID) 200 MCG tablet Take 1 tab by mouth daily, except 1/2 tab on Sat/'s. 24  Yes Mary Schneider DO   losartan (COZAAR) 50 MG tablet Take 1 tablet by mouth daily 24  Yes aMry Schneider DO   ezetimibe (ZETIA) 10 MG tablet Take 1 tablet by mouth daily 24  Yes Mary Schneider DO   pravastatin (PRAVACHOL) 80 MG tablet Take 1 tablet by mouth daily 3/27/24  Yes Mary Schneider DO   escitalopram (LEXAPRO) 10 MG tablet Take 1 tablet by mouth daily 3/27/24  Yes Mary Schneider DO   calcium carbonate (OSCAL) 500 MG TABS tablet Take 1 tablet by mouth daily   Yes Amaury Armas MD   Multiple Vitamin (MULTI-VITAMIN DAILY PO) Take  by mouth.   Yes Amaury Armas MD       Current medications:    Current Facility-Administered Medications

## 2025-02-19 NOTE — DISCHARGE INSTRUCTIONS
May remove the dressings after 24 hours  May shower after 24 hours, no tub bath or soaking for 2 weeks  May use port after 24 hours  Follow up as needed with general surgery   Use norco sparingly as needed for pain and reduce how much you are using as pain improves. Post Operative Instructions for INFUSION PORT INSERTION    Activity - In general, you should plan to rest today.  You have received sedation - DO NOT DRIVE A CAR, OPERATE MACHINERY OR PARTICIPATE IN ANY ACTIVITY WHICH HAS A SIGNIFICANT RISK FOR INJURY for at least 24 hours following surgery, or while taking narcotic medication.    Diet - You may resume your usual diet (including any previous restrictions) unless otherwise instructed.  AVOID ALCOHOL WHILE TAKING PAIN MEDICATIONS.    Medication and Pain Management - Unless otherwise instructed, resume all of your usual medications.  In addition, you will be given a prescription for pain medication which should be taken only as labeled.  Keep in mind that narcotic medications can cause constipation - you may wish to take an OTC stool softener.  For mild discomfort you may take any OTC acetaminophen (Tylenol) or ibuprofen (Advil/Motrin) product, instead of the prescription pain medication.    Wound Care -   Leave your bandage intact for 24 hours.  After that time it may be removed, and left off if desired.  After 24 hours you may shower or sponge bathe, do not soak in a tub.  Do not scrub your incisions or apply ointments or lotions, unless otherwise instructed.  If you have paper tapes on your incision (steri-strips, butterfly bandages,etc.,) you may get them wet.  Typically they will loosen and fall off in 5-7 days.  Some bruising and discoloration near the site is common and will gradually fade over a few weeks.  You may use an ice pack to the site for 20 minutes, 3-4 times daily for 2-3 days to help with discomfort, swelling and bruising.    Follow-up - As scheduled.    Notify your physician if you have

## 2025-02-20 ENCOUNTER — HOSPITAL ENCOUNTER (OUTPATIENT)
Dept: CT IMAGING | Age: 65
Discharge: HOME | End: 2025-02-22
Payer: MEDICAID

## 2025-02-20 VITALS
DIASTOLIC BLOOD PRESSURE: 60 MMHG | HEART RATE: 69 BPM | BODY MASS INDEX: 36.19 KG/M2 | HEIGHT: 64 IN | SYSTOLIC BLOOD PRESSURE: 168 MMHG | TEMPERATURE: 96.8 F | WEIGHT: 212 LBS | RESPIRATION RATE: 18 BRPM | OXYGEN SATURATION: 97 %

## 2025-02-20 DIAGNOSIS — M79.2 PAROXYSMAL NERVE PAIN: ICD-10-CM

## 2025-02-20 DIAGNOSIS — C83.30 RETICULOSARCOMA (HCC): ICD-10-CM

## 2025-02-20 LAB
INR PPP: 0.9
PARTIAL THROMBOPLASTIN TIME: 20.2 SEC (ref 23–36.5)
PLATELET # BLD AUTO: 191 K/UL (ref 138–453)
PROTHROMBIN TIME: 12.4 SEC (ref 11.7–14.9)

## 2025-02-20 PROCEDURE — 88264 CHROMOSOME ANALYSIS 20-25: CPT

## 2025-02-20 PROCEDURE — 6360000002 HC RX W HCPCS: Performed by: RADIOLOGY

## 2025-02-20 PROCEDURE — 88237 TISSUE CULTURE BONE MARROW: CPT

## 2025-02-20 PROCEDURE — 85730 THROMBOPLASTIN TIME PARTIAL: CPT

## 2025-02-20 PROCEDURE — 2709999900 CT BIOPSY BONE MARROW

## 2025-02-20 PROCEDURE — 85049 AUTOMATED PLATELET COUNT: CPT

## 2025-02-20 PROCEDURE — 85610 PROTHROMBIN TIME: CPT

## 2025-02-20 PROCEDURE — 88280 CHROMOSOME KARYOTYPE STUDY: CPT

## 2025-02-20 RX ORDER — FENTANYL CITRATE 50 UG/ML
INJECTION, SOLUTION INTRAMUSCULAR; INTRAVENOUS PRN
Status: COMPLETED | OUTPATIENT
Start: 2025-02-20 | End: 2025-02-20

## 2025-02-20 RX ORDER — SODIUM CHLORIDE 9 MG/ML
INJECTION, SOLUTION INTRAVENOUS CONTINUOUS
Status: DISCONTINUED | OUTPATIENT
Start: 2025-02-20 | End: 2025-02-23 | Stop reason: HOSPADM

## 2025-02-20 RX ADMIN — FENTANYL CITRATE 50 MCG: 50 INJECTION, SOLUTION INTRAMUSCULAR; INTRAVENOUS at 12:27

## 2025-02-20 ASSESSMENT — PAIN - FUNCTIONAL ASSESSMENT
PAIN_FUNCTIONAL_ASSESSMENT: NONE - DENIES PAIN
PAIN_FUNCTIONAL_ASSESSMENT: NONE - DENIES PAIN

## 2025-02-20 NOTE — BRIEF OP NOTE
Brief Postoperative Note for Bone Marrow Biopsy    Therese Mckeon  YOB: 1960  [id number]    Pre-operative Diagnosis: Lymphoma     Post-operative Diagnosis: Same     Procedure: Right posterior iliac bone marrow aspiration with and without heparin & biopsy     Anesthesia: 1% lidocaine and fentanyl     Surgeons/Assistants: MD Kai; AIME Montero     Estimated Blood Loss: Minimal       Complications: None     Specimens Were Obtained: from the Right posterior iliac spine using an Arrow Oncontrol Device 11 g x 4 in. Aspirate was collected with and without heparin. A bone biopsy was also obtained. Specimens were received by hem/onc at the time of collection. Vital signs were reviewed and were stable after the procedure.     Electronically signed by AIME Montero on 2/20/2025 at 12:42 PM

## 2025-02-20 NOTE — H&P
History and Physical    Pt Name: Therese Mckeon  MRN: [id number]  YOB: 1960  Date of evaluation: 2/20/2025  Primary Care Physician: Mary Schneider DO    SUBJECTIVE:   History of Chief Complaint:    Therese Mckeon is a 65 y.o. female who is scheduled today for bone marrow biopsy. Patient reports she has been having bilateral leg pain and swelling for months. She denies any other symptoms. Patient had bone biopsy last month which she states required more information so she is back for bone marrow biopsy. Patient states she had a port placed for chemotherapy initiation yesterday.   Allergies  is allergic to amoxicillin er.  Medications  Prior to Admission medications    Medication Sig Start Date End Date Taking? Authorizing Provider   furosemide (LASIX) 20 MG tablet Take 1 tablet by mouth daily 2/12/25  Yes Lm Mirza MD   gabapentin (NEURONTIN) 100 MG capsule Take 2 capsules by mouth in the morning and at bedtime for 30 days. May increase to 3 capsules BID after 7 days, if needed. 1/22/25 2/21/25 Yes Lm Mirza MD   dexAMETHasone (DECADRON) 4 MG tablet Take 1 tablet by mouth 2 times daily (with meals)  Patient taking differently: Take 1 tablet by mouth 2 times daily (with meals) 2/12/25-Taking 2 mg twice per day 1/22/25  Yes Lm Mirza MD   HYDROcodone-acetaminophen (NORCO) 5-325 MG per tablet Take 1 tablet by mouth every 6 hours as needed for Pain for up to 30 days. Max Daily Amount: 4 tablets 1/22/25 2/21/25 Yes Lm Mirza MD   levothyroxine (SYNTHROID) 200 MCG tablet Take 1 tab by mouth daily, except 1/2 tab on Sat/Sunday's. 12/27/24  Yes Mary Schneider DO   losartan (COZAAR) 50 MG tablet Take 1 tablet by mouth daily 9/25/24  Yes Mary Schneider DO   ezetimibe (ZETIA) 10 MG tablet Take 1 tablet by mouth daily 5/2/24  Yes Mary Schneider DO   pravastatin (PRAVACHOL) 80 MG tablet Take 1 tablet by mouth daily 3/27/24  Yes Mary Schneider DO   escitalopram (LEXAPRO) 10 MG tablet Take

## 2025-02-24 ENCOUNTER — TELEPHONE (OUTPATIENT)
Dept: ONCOLOGY | Age: 65
End: 2025-02-24

## 2025-02-24 NOTE — TELEPHONE ENCOUNTER
Name: Therese Mckeon  : 1960  MRN: [id number]    Oncology Navigation Follow-Up Note    Contact Type:  Telephone    Notes:   Reviewing chart. Patient had bone marrow biopsy completed 25. Not yet resulted. Port placed 25. Treatment orders have been entered. Discussed with Luis in infusion.   Spoke with patient by phone to review role as navigator and contact information/availability. She states she had some discomfort after the biopsy but is starting to feel better today. She denies any barriers to care. Informed her the infusion team will call her to set up teaching appt. She verbalized understanding. Encouraged her to call if she has questions/concerns. Understanding verbalized.   Navigator following for continuity of care.    Electronically signed by Yelena Galvan RN on 2025 at 11:43 AM

## 2025-02-25 LAB — BONE MARROW REPORT: NORMAL

## 2025-02-26 LAB
CHROMOSOME STUDY: NORMAL
FLOW CYTOMETRY, BM: NORMAL

## 2025-03-04 ENCOUNTER — TELEMEDICINE (OUTPATIENT)
Dept: ONCOLOGY | Age: 65
End: 2025-03-04
Payer: MEDICAID

## 2025-03-04 DIAGNOSIS — C83.398 DIFFUSE LARGE B-CELL LYMPHOMA OF SOLID ORGAN EXCLUDING SPLEEN: ICD-10-CM

## 2025-03-04 DIAGNOSIS — R49.0 HOARSENESS OF VOICE: ICD-10-CM

## 2025-03-04 DIAGNOSIS — C83.30 DIFFUSE LARGE B-CELL LYMPHOMA, UNSPECIFIED BODY REGION (HCC): Primary | ICD-10-CM

## 2025-03-04 PROCEDURE — 99214 OFFICE O/P EST MOD 30 MIN: CPT | Performed by: INTERNAL MEDICINE

## 2025-03-04 RX ORDER — MEPERIDINE HYDROCHLORIDE 50 MG/ML
12.5 INJECTION INTRAMUSCULAR; INTRAVENOUS; SUBCUTANEOUS PRN
OUTPATIENT
Start: 2025-03-04

## 2025-03-04 RX ORDER — ONDANSETRON 2 MG/ML
8 INJECTION INTRAMUSCULAR; INTRAVENOUS
OUTPATIENT
Start: 2025-03-04

## 2025-03-04 RX ORDER — DIPHENHYDRAMINE HYDROCHLORIDE 50 MG/ML
50 INJECTION INTRAMUSCULAR; INTRAVENOUS
OUTPATIENT
Start: 2025-03-04

## 2025-03-04 RX ORDER — DOXORUBICIN HYDROCHLORIDE 2 MG/ML
50 INJECTION, SOLUTION INTRAVENOUS ONCE
OUTPATIENT
Start: 2025-03-04 | End: 2025-03-04

## 2025-03-04 RX ORDER — PROCHLORPERAZINE EDISYLATE 5 MG/ML
5 INJECTION INTRAMUSCULAR; INTRAVENOUS
OUTPATIENT
Start: 2025-03-04

## 2025-03-04 RX ORDER — ALBUTEROL SULFATE 90 UG/1
4 INHALANT RESPIRATORY (INHALATION) PRN
OUTPATIENT
Start: 2025-03-04

## 2025-03-04 RX ORDER — HEPARIN SODIUM (PORCINE) LOCK FLUSH IV SOLN 100 UNIT/ML 100 UNIT/ML
500 SOLUTION INTRAVENOUS PRN
OUTPATIENT
Start: 2025-03-04

## 2025-03-04 RX ORDER — HYDROCODONE BITARTRATE AND ACETAMINOPHEN 10; 325 MG/1; MG/1
1 TABLET ORAL EVERY 8 HOURS PRN
Qty: 63 TABLET | Refills: 0 | Status: SHIPPED | OUTPATIENT
Start: 2025-03-04 | End: 2025-03-25

## 2025-03-04 RX ORDER — SODIUM CHLORIDE 9 MG/ML
5-250 INJECTION, SOLUTION INTRAVENOUS PRN
OUTPATIENT
Start: 2025-03-04

## 2025-03-04 RX ORDER — ACETAMINOPHEN 325 MG/1
650 TABLET ORAL ONCE
OUTPATIENT
Start: 2025-03-04 | End: 2025-03-04

## 2025-03-04 RX ORDER — HYDROCORTISONE SODIUM SUCCINATE 100 MG/2ML
100 INJECTION INTRAMUSCULAR; INTRAVENOUS
OUTPATIENT
Start: 2025-03-04

## 2025-03-04 RX ORDER — SODIUM CHLORIDE 0.9 % (FLUSH) 0.9 %
5-40 SYRINGE (ML) INJECTION PRN
OUTPATIENT
Start: 2025-03-04

## 2025-03-04 RX ORDER — PREDNISONE 50 MG/1
50 TABLET ORAL 2 TIMES DAILY
Qty: 10 TABLET | Refills: 0 | Status: SHIPPED | OUTPATIENT
Start: 2025-03-04 | End: 2025-03-19

## 2025-03-04 RX ORDER — EPINEPHRINE 1 MG/ML
0.3 INJECTION, SOLUTION, CONCENTRATE INTRAVENOUS PRN
OUTPATIENT
Start: 2025-03-04

## 2025-03-04 RX ORDER — DIPHENHYDRAMINE HYDROCHLORIDE 50 MG/ML
50 INJECTION INTRAMUSCULAR; INTRAVENOUS ONCE
OUTPATIENT
Start: 2025-03-04 | End: 2025-03-04

## 2025-03-04 RX ORDER — FAMOTIDINE 10 MG/ML
20 INJECTION, SOLUTION INTRAVENOUS
OUTPATIENT
Start: 2025-03-04

## 2025-03-04 RX ORDER — ACETAMINOPHEN 325 MG/1
650 TABLET ORAL
OUTPATIENT
Start: 2025-03-04

## 2025-03-04 RX ORDER — SODIUM CHLORIDE 9 MG/ML
INJECTION, SOLUTION INTRAVENOUS CONTINUOUS
OUTPATIENT
Start: 2025-03-04

## 2025-03-04 RX ORDER — PALONOSETRON 0.05 MG/ML
0.25 INJECTION, SOLUTION INTRAVENOUS ONCE
OUTPATIENT
Start: 2025-03-04 | End: 2025-03-04

## 2025-03-04 NOTE — PATIENT INSTRUCTIONS
Start chemo this week.  Referred to ENT, Dr. Tatum for hoarseness of voice chemo teach  Return to clinic in 4 weeks

## 2025-03-04 NOTE — PROGRESS NOTES
Writer notified Luis scott to start tx this week, writer also sent referral to ENT  
Grandmother         due to blood clot - age 61    No Known Problems Maternal Aunt     No Known Problems Maternal Aunt     No Known Problems Paternal Aunt     No Known Problems Paternal Aunt     No Known Problems Paternal Aunt     Glaucoma Neg Hx      Social History     Socioeconomic History    Marital status:    Occupational History     Employer: NONE   Tobacco Use    Smoking status: Every Day     Current packs/day: 1.00     Average packs/day: 1 pack/day for 35.0 years (35.0 ttl pk-yrs)     Types: Cigarettes    Smokeless tobacco: Never   Vaping Use    Vaping status: Never Used   Substance and Sexual Activity    Alcohol use: No    Drug use: No    Sexual activity: Yes     Partners: Male     Social Determinants of Health     Financial Resource Strain: Low Risk  (9/25/2024)    Overall Financial Resource Strain (CARDIA)     Difficulty of Paying Living Expenses: Not hard at all   Food Insecurity: No Food Insecurity (9/25/2024)    Hunger Vital Sign     Worried About Running Out of Food in the Last Year: Never true     Ran Out of Food in the Last Year: Never true   Transportation Needs: Unknown (9/25/2024)    PRAPARE - Transportation     Lack of Transportation (Non-Medical): No   Physical Activity: Sufficiently Active (3/6/2023)    Exercise Vital Sign     Days of Exercise per Week: 7 days     Minutes of Exercise per Session: 40 min   Intimate Partner Violence: Not At Risk (3/6/2023)    Humiliation, Afraid, Rape, and Kick questionnaire     Fear of Current or Ex-Partner: No     Emotionally Abused: No     Physically Abused: No     Sexually Abused: No   Housing Stability: Unknown (9/25/2024)    Housing Stability Vital Sign     Homeless in the Last Year: No     Current Medications:  Current Outpatient Medications   Medication Sig Dispense Refill    HYDROcodone-acetaminophen (NORCO)  MG per tablet Take 1 tablet by mouth every 8 hours as needed for Pain for up to 21 days. Intended supply: 30 days Max Daily Amount: 3

## 2025-03-05 ENCOUNTER — TELEPHONE (OUTPATIENT)
Dept: ADMINISTRATIVE | Age: 65
End: 2025-03-05

## 2025-03-05 ENCOUNTER — HOSPITAL ENCOUNTER (OUTPATIENT)
Dept: INFUSION THERAPY | Age: 65
Discharge: HOME | End: 2025-03-05
Payer: MEDICAID

## 2025-03-05 ENCOUNTER — TELEMEDICINE (OUTPATIENT)
Dept: NEUROSURGERY | Age: 65
End: 2025-03-05
Payer: MEDICAID

## 2025-03-05 DIAGNOSIS — M79.2 NEUROPATHIC PAIN: ICD-10-CM

## 2025-03-05 DIAGNOSIS — C80.1 MALIGNANT NEOPLASM METASTATIC TO LUMBAR SPINE WITH UNKNOWN PRIMARY SITE (HCC): Primary | ICD-10-CM

## 2025-03-05 DIAGNOSIS — C83.398 DIFFUSE LARGE B-CELL LYMPHOMA OF SOLID ORGAN EXCLUDING SPLEEN: ICD-10-CM

## 2025-03-05 DIAGNOSIS — M48.062 LUMBAR STENOSIS WITH NEUROGENIC CLAUDICATION: ICD-10-CM

## 2025-03-05 DIAGNOSIS — C79.51 MALIGNANT NEOPLASM METASTATIC TO LUMBAR SPINE WITH UNKNOWN PRIMARY SITE (HCC): Primary | ICD-10-CM

## 2025-03-05 DIAGNOSIS — C83.30 DIFFUSE LARGE B-CELL LYMPHOMA, UNSPECIFIED BODY REGION (HCC): Primary | ICD-10-CM

## 2025-03-05 DIAGNOSIS — C83.30 DIFFUSE LARGE B-CELL LYMPHOMA, UNSPECIFIED BODY REGION (HCC): ICD-10-CM

## 2025-03-05 PROCEDURE — 36591 DRAW BLOOD OFF VENOUS DEVICE: CPT

## 2025-03-05 PROCEDURE — 80074 ACUTE HEPATITIS PANEL: CPT

## 2025-03-05 PROCEDURE — 99213 OFFICE O/P EST LOW 20 MIN: CPT | Performed by: NEUROLOGICAL SURGERY

## 2025-03-05 PROCEDURE — 86704 HEP B CORE ANTIBODY TOTAL: CPT

## 2025-03-05 PROCEDURE — 86317 IMMUNOASSAY INFECTIOUS AGENT: CPT

## 2025-03-05 PROCEDURE — 99213 OFFICE O/P EST LOW 20 MIN: CPT

## 2025-03-05 PROCEDURE — 2500000003 HC RX 250 WO HCPCS: Performed by: INTERNAL MEDICINE

## 2025-03-05 PROCEDURE — 6360000002 HC RX W HCPCS: Performed by: INTERNAL MEDICINE

## 2025-03-05 RX ORDER — SODIUM CHLORIDE 0.9 % (FLUSH) 0.9 %
5-40 SYRINGE (ML) INJECTION PRN
Status: CANCELLED | OUTPATIENT
Start: 2025-03-05

## 2025-03-05 RX ORDER — SODIUM CHLORIDE 0.9 % (FLUSH) 0.9 %
5-40 SYRINGE (ML) INJECTION PRN
Status: DISCONTINUED | OUTPATIENT
Start: 2025-03-05 | End: 2025-03-06 | Stop reason: HOSPADM

## 2025-03-05 RX ORDER — HEPARIN 100 UNIT/ML
500 SYRINGE INTRAVENOUS PRN
Status: DISCONTINUED | OUTPATIENT
Start: 2025-03-05 | End: 2025-03-06 | Stop reason: HOSPADM

## 2025-03-05 RX ORDER — SODIUM CHLORIDE 9 MG/ML
25 INJECTION, SOLUTION INTRAVENOUS PRN
OUTPATIENT
Start: 2025-03-05

## 2025-03-05 RX ORDER — SODIUM CHLORIDE 9 MG/ML
25 INJECTION, SOLUTION INTRAVENOUS PRN
Status: CANCELLED | OUTPATIENT
Start: 2025-03-05

## 2025-03-05 RX ORDER — HEPARIN 100 UNIT/ML
500 SYRINGE INTRAVENOUS PRN
Status: CANCELLED | OUTPATIENT
Start: 2025-03-05

## 2025-03-05 RX ADMIN — HEPARIN 500 UNITS: 100 SYRINGE at 16:00

## 2025-03-05 RX ADMIN — SODIUM CHLORIDE, PRESERVATIVE FREE 10 ML: 5 INJECTION INTRAVENOUS at 16:00

## 2025-03-05 NOTE — PROGRESS NOTES
daily  Provider, MD Amaury   Multiple Vitamin (MULTI-VITAMIN DAILY PO) Take  by mouth.  Provider, MD Amaury       Social History     Tobacco Use    Smoking status: Every Day     Current packs/day: 1.00     Average packs/day: 1 pack/day for 35.0 years (35.0 ttl pk-yrs)     Types: Cigarettes    Smokeless tobacco: Never   Vaping Use    Vaping status: Never Used   Substance Use Topics    Alcohol use: No    Drug use: No        Allergies   Allergen Reactions    Amoxicillin Er Itching     Makes blood pressure drop   ,   Past Medical History:   Diagnosis Date    Hyperlipidemia     Hypertension     Hypothyroidism     Large B-cell lymphoma (HCC)     Obesity     Reticulosarcoma (HCC)     Smoker     Spinal cord lesion (HCC)    ,   Past Surgical History:   Procedure Laterality Date    BONE BIOPSY N/A 01/17/2025    BONE MARROW BIOPSY N/A 02/20/2025    CT BIOPSY BONE MARROW    COLONOSCOPY      CT BIOPSY BONE MARROW  2/20/2025    CT BIOPSY BONE MARROW 2/20/2025 [facility] CT SCAN    CT BIOPSY DEEP BONE PERCUTANEOUS  01/17/2025    CT BIOPSY DEEP BONE PERCUTANEOUS 1/17/2025 [facility] CT SCAN    CYSTOCELE REPAIR  09/25/2020    Midurethral sling via the retropubic route of the [employer] Advantage Fit polypropylene side. REF is [id number], LOT number is [id number]; urethrolysis and excision of suburethral band    DILATION AND CURETTAGE OF UTERUS  1995    following miscarriage    PARTIAL HYSTERECTOMY (CERVIX NOT REMOVED)  09/25/2020    [facility] - Dr. Kimball; uterus & both ovaries/fallopian tubes removed    PORT SURGERY Right 02/19/2025    Right Port insertion performed by Momo Urias MD at [facility] OR    THYROIDECTOMY  1980    due to abscess    UTERUS SURGERY  09/25/2020    Robot-assisted laparoscopic sacrocolpopexy with polypropylene Y-shaped mesh of the [employer] type (REF [id number], LOT [id number])   ,   Social History     Tobacco Use    Smoking status: Every Day     Current packs/day: 1.00     Average

## 2025-03-05 NOTE — PROGRESS NOTES
VAD accessed per protocol with 3/4 inch 20 gauge terrell needle. Flushes easy with good blood return.  Labs Drawn. Flushed with 10 ml of normal saline and 5 ml of heparin flush. D/C'd 3/4 inch terrell needle and band aide applied.    Pt here for chemotherapy teaching. Spent 60 minutes with them.  Teaching sheets from chemoexperts and verbal information given on chemotherapy agents, action, administration and side effects.   Provided books chemotherapy and you and Eating Hints: Before During, and After Cancer treatment.  Handout about unit with phone numbers for Infusion Center at [facility],  [facility], and [facility] provided.  Drugs discussed: rituximab, cyclophosphamide, doxocubicin, vincrisitine,  Discussed implanted port and demonstrated     Reviewed anti emetic medications,prescription for zofran 8mg odt, one every 8 hours as needed for nausea, Q30 with 2 refills called to [facility] pharmacy in [place], [place].  Questions answered, support given

## 2025-03-05 NOTE — TELEPHONE ENCOUNTER
New adult patient referral received for DX of   Diffuse large B-cell lymphoma, unspecified body region  Hoarseness of voice    Please review and call to Schedule

## 2025-03-06 ENCOUNTER — CLINICAL DOCUMENTATION (OUTPATIENT)
Facility: HOSPITAL | Age: 65
End: 2025-03-06

## 2025-03-06 LAB
HAV IGM SERPL QL IA: NONREACTIVE
HBV CORE AB SER QL: NONREACTIVE
HBV CORE IGM SERPL QL IA: NONREACTIVE
HBV SURFACE AB SERPL IA-ACNC: <3.5 MIU/ML
HBV SURFACE AG SERPL QL IA: NONREACTIVE
HCV AB SERPL QL IA: NONREACTIVE

## 2025-03-06 NOTE — PROGRESS NOTES
Patient Assistance    Treatment and insurance reviewed; no assistance needed.

## 2025-03-10 ENCOUNTER — HOSPITAL ENCOUNTER (OUTPATIENT)
Dept: INFUSION THERAPY | Age: 65
Discharge: HOME | End: 2025-03-10

## 2025-03-10 NOTE — PROGRESS NOTES
Pt ambulated into infusion center and notified that we are unable to do treatment today.  Per pharmacy not all of her chemo came in on Friday.  Will call pt and notify when we can start.

## 2025-03-11 ENCOUNTER — HOSPITAL ENCOUNTER (OUTPATIENT)
Dept: INFUSION THERAPY | Age: 65
End: 2025-03-11
Payer: MEDICAID

## 2025-03-11 ENCOUNTER — HOSPITAL ENCOUNTER (OUTPATIENT)
Dept: INFUSION THERAPY | Age: 65
Discharge: HOME | End: 2025-03-11
Payer: MEDICAID

## 2025-03-11 ENCOUNTER — TELEPHONE (OUTPATIENT)
Dept: ONCOLOGY | Age: 65
End: 2025-03-11

## 2025-03-11 DIAGNOSIS — C83.30 DIFFUSE LARGE B-CELL LYMPHOMA, UNSPECIFIED BODY REGION (HCC): Primary | ICD-10-CM

## 2025-03-11 LAB
ALBUMIN SERPL-MCNC: 3.8 G/DL (ref 3.5–5.2)
ALBUMIN/GLOB SERPL: 1.4 {RATIO} (ref 1–2.5)
ALP SERPL-CCNC: 71 U/L (ref 35–104)
ALT SERPL-CCNC: 64 U/L (ref 5–33)
ANION GAP SERPL CALCULATED.3IONS-SCNC: 10 MMOL/L (ref 9–17)
AST SERPL-CCNC: 26 U/L
BASOPHILS # BLD: 0.04 K/UL (ref 0–0.2)
BASOPHILS NFR BLD: 0 % (ref 0–2)
BILIRUB SERPL-MCNC: 0.2 MG/DL (ref 0.3–1.2)
BUN SERPL-MCNC: 24 MG/DL (ref 8–23)
BUN/CREAT SERPL: 48 (ref 9–20)
CALCIUM SERPL-MCNC: 9.4 MG/DL (ref 8.6–10.4)
CHLORIDE SERPL-SCNC: 101 MMOL/L (ref 98–107)
CO2 SERPL-SCNC: 28 MMOL/L (ref 20–31)
CREAT SERPL-MCNC: 0.5 MG/DL (ref 0.5–0.9)
EOSINOPHIL # BLD: 0.08 K/UL (ref 0–0.44)
EOSINOPHILS RELATIVE PERCENT: 1 % (ref 1–4)
ERYTHROCYTE [DISTWIDTH] IN BLOOD BY AUTOMATED COUNT: 15.9 % (ref 11.8–14.4)
GFR, ESTIMATED: >90 ML/MIN/1.73M2
GLUCOSE SERPL-MCNC: 94 MG/DL (ref 70–99)
HCT VFR BLD AUTO: 39.5 % (ref 36.3–47.1)
HGB BLD-MCNC: 12.7 G/DL (ref 11.9–15.1)
IMM GRANULOCYTES # BLD AUTO: 0.11 K/UL (ref 0–0.3)
IMM GRANULOCYTES NFR BLD: 1 %
LYMPHOCYTES NFR BLD: 3.12 K/UL (ref 1.1–3.7)
LYMPHOCYTES RELATIVE PERCENT: 34 % (ref 24–43)
MCH RBC QN AUTO: 30.3 PG (ref 25.2–33.5)
MCHC RBC AUTO-ENTMCNC: 32.2 G/DL (ref 25.2–33.5)
MCV RBC AUTO: 94.3 FL (ref 82.6–102.9)
MONOCYTES NFR BLD: 0.68 K/UL (ref 0.1–1.2)
MONOCYTES NFR BLD: 7 % (ref 3–12)
NEUTROPHILS NFR BLD: 57 % (ref 36–65)
NEUTS SEG NFR BLD: 5.19 K/UL (ref 1.5–8.1)
NRBC BLD-RTO: 0.4 PER 100 WBC
PLATELET # BLD AUTO: 224 K/UL (ref 138–453)
PMV BLD AUTO: 8.9 FL (ref 8.1–13.5)
POTASSIUM SERPL-SCNC: 4 MMOL/L (ref 3.7–5.3)
PROT SERPL-MCNC: 6.5 G/DL (ref 6.4–8.3)
RBC # BLD AUTO: 4.19 M/UL (ref 3.95–5.11)
RBC # BLD: ABNORMAL 10*6/UL
SODIUM SERPL-SCNC: 139 MMOL/L (ref 135–144)
WBC OTHER # BLD: 9.2 K/UL (ref 3.5–11.3)

## 2025-03-11 PROCEDURE — 80053 COMPREHEN METABOLIC PANEL: CPT

## 2025-03-11 PROCEDURE — 2500000003 HC RX 250 WO HCPCS: Performed by: INTERNAL MEDICINE

## 2025-03-11 PROCEDURE — 36591 DRAW BLOOD OFF VENOUS DEVICE: CPT

## 2025-03-11 PROCEDURE — 6360000002 HC RX W HCPCS: Performed by: INTERNAL MEDICINE

## 2025-03-11 PROCEDURE — 85025 COMPLETE CBC W/AUTO DIFF WBC: CPT

## 2025-03-11 RX ORDER — SODIUM CHLORIDE 0.9 % (FLUSH) 0.9 %
5-40 SYRINGE (ML) INJECTION PRN
OUTPATIENT
Start: 2025-03-11

## 2025-03-11 RX ORDER — HEPARIN 100 UNIT/ML
500 SYRINGE INTRAVENOUS PRN
OUTPATIENT
Start: 2025-03-11

## 2025-03-11 RX ORDER — SODIUM CHLORIDE 0.9 % (FLUSH) 0.9 %
5-40 SYRINGE (ML) INJECTION PRN
Status: DISCONTINUED | OUTPATIENT
Start: 2025-03-11 | End: 2025-03-12 | Stop reason: HOSPADM

## 2025-03-11 RX ORDER — HEPARIN 100 UNIT/ML
500 SYRINGE INTRAVENOUS PRN
Status: DISCONTINUED | OUTPATIENT
Start: 2025-03-11 | End: 2025-03-12 | Stop reason: HOSPADM

## 2025-03-11 RX ORDER — SODIUM CHLORIDE 9 MG/ML
25 INJECTION, SOLUTION INTRAVENOUS PRN
OUTPATIENT
Start: 2025-03-11

## 2025-03-11 RX ADMIN — SODIUM CHLORIDE, PRESERVATIVE FREE 10 ML: 5 INJECTION INTRAVENOUS at 14:50

## 2025-03-11 RX ADMIN — HEPARIN 500 UNITS: 100 SYRINGE at 14:50

## 2025-03-11 NOTE — TELEPHONE ENCOUNTER
Name: Therese Mckeon  : 1960  MRN: [id number]    Oncology Navigation Follow-Up Note    Contact Type:  Telephone    Notes:   Reviewing chart. Patient had labs drawn today and plans to start treatment tomorrow. Phone call to assess for barriers to care. She denies needs at this time. Is anxious to start treatment.   Informed her writer will leave a navigation resources folder at [facility] for her to receive tomorrow. She verbalized understanding.  Navigator following for continuity of care.      Electronically signed by Yelena Galvan RN on 3/11/2025 at 3:26 PM

## 2025-03-11 NOTE — PROGRESS NOTES
VAD accessed per protocol with 3/4 inch 20 gauge terrell needle. Flushes easy with good blood return.  Labs Drawn. Flushed with 10 ml of normal saline and 5 ml of heparin flush. D/C'd 3/4 inch terrell needle and band aide applied.

## 2025-03-12 ENCOUNTER — HOSPITAL ENCOUNTER (OUTPATIENT)
Dept: INFUSION THERAPY | Age: 65
Discharge: HOME | End: 2025-03-12
Payer: MEDICAID

## 2025-03-12 VITALS
HEART RATE: 90 BPM | TEMPERATURE: 97 F | WEIGHT: 223 LBS | HEIGHT: 64 IN | BODY MASS INDEX: 38.07 KG/M2 | OXYGEN SATURATION: 93 % | SYSTOLIC BLOOD PRESSURE: 116 MMHG | DIASTOLIC BLOOD PRESSURE: 66 MMHG | RESPIRATION RATE: 18 BRPM

## 2025-03-12 DIAGNOSIS — E78.5 HYPERLIPIDEMIA, UNSPECIFIED HYPERLIPIDEMIA TYPE: ICD-10-CM

## 2025-03-12 DIAGNOSIS — M79.604 BILATERAL LEG PAIN: ICD-10-CM

## 2025-03-12 DIAGNOSIS — M48.062 LUMBAR STENOSIS WITH NEUROGENIC CLAUDICATION: ICD-10-CM

## 2025-03-12 DIAGNOSIS — C80.1 MALIGNANT NEOPLASM METASTATIC TO LUMBAR SPINE WITH UNKNOWN PRIMARY SITE (HCC): ICD-10-CM

## 2025-03-12 DIAGNOSIS — C79.51 MALIGNANT NEOPLASM METASTATIC TO LUMBAR SPINE WITH UNKNOWN PRIMARY SITE (HCC): ICD-10-CM

## 2025-03-12 DIAGNOSIS — M79.605 BILATERAL LEG PAIN: ICD-10-CM

## 2025-03-12 DIAGNOSIS — C83.398 DIFFUSE LARGE B-CELL LYMPHOMA OF SOLID ORGAN EXCLUDING SPLEEN: Primary | ICD-10-CM

## 2025-03-12 DIAGNOSIS — C83.398 DIFFUSE LARGE B-CELL LYMPHOMA OF SOLID ORGAN EXCLUDING SPLEEN: ICD-10-CM

## 2025-03-12 DIAGNOSIS — M54.50 BILATERAL LOW BACK PAIN, UNSPECIFIED CHRONICITY, UNSPECIFIED WHETHER SCIATICA PRESENT: ICD-10-CM

## 2025-03-12 DIAGNOSIS — Z13.21 ENCOUNTER FOR VITAMIN DEFICIENCY SCREENING: ICD-10-CM

## 2025-03-12 DIAGNOSIS — E03.9 ACQUIRED HYPOTHYROIDISM: ICD-10-CM

## 2025-03-12 LAB
25(OH)D3 SERPL-MCNC: 21.9 NG/ML (ref 30–100)
CHOLEST SERPL-MCNC: 184 MG/DL (ref 0–199)
CHOLESTEROL/HDL RATIO: 3
HDLC SERPL-MCNC: 61 MG/DL
LDLC SERPL CALC-MCNC: 79 MG/DL (ref 0–100)
T4 FREE SERPL-MCNC: 1.2 NG/DL (ref 0.92–1.68)
TRIGL SERPL-MCNC: 218 MG/DL
TSH SERPL DL<=0.05 MIU/L-ACNC: 8.24 UIU/ML (ref 0.3–5)
VLDLC SERPL CALC-MCNC: 44 MG/DL (ref 1–30)

## 2025-03-12 PROCEDURE — 6360000002 HC RX W HCPCS: Performed by: INTERNAL MEDICINE

## 2025-03-12 PROCEDURE — 84443 ASSAY THYROID STIM HORMONE: CPT

## 2025-03-12 PROCEDURE — 80061 LIPID PANEL: CPT

## 2025-03-12 PROCEDURE — 96417 CHEMO IV INFUS EACH ADDL SEQ: CPT

## 2025-03-12 PROCEDURE — 96413 CHEMO IV INFUSION 1 HR: CPT

## 2025-03-12 PROCEDURE — 96375 TX/PRO/DX INJ NEW DRUG ADDON: CPT

## 2025-03-12 PROCEDURE — 2580000003 HC RX 258: Performed by: INTERNAL MEDICINE

## 2025-03-12 PROCEDURE — 84439 ASSAY OF FREE THYROXINE: CPT

## 2025-03-12 PROCEDURE — 2500000003 HC RX 250 WO HCPCS: Performed by: INTERNAL MEDICINE

## 2025-03-12 PROCEDURE — 6370000000 HC RX 637 (ALT 250 FOR IP): Performed by: INTERNAL MEDICINE

## 2025-03-12 PROCEDURE — 82306 VITAMIN D 25 HYDROXY: CPT

## 2025-03-12 PROCEDURE — 96367 TX/PROPH/DG ADDL SEQ IV INF: CPT

## 2025-03-12 PROCEDURE — 96415 CHEMO IV INFUSION ADDL HR: CPT

## 2025-03-12 PROCEDURE — 96411 CHEMO IV PUSH ADDL DRUG: CPT

## 2025-03-12 RX ORDER — DOXORUBICIN HYDROCHLORIDE 2 MG/ML
50 INJECTION, SOLUTION INTRAVENOUS ONCE
Status: COMPLETED | OUTPATIENT
Start: 2025-03-12 | End: 2025-03-12

## 2025-03-12 RX ORDER — GABAPENTIN 100 MG/1
200 CAPSULE ORAL 2 TIMES DAILY
Qty: 120 CAPSULE | Refills: 0 | Status: SHIPPED | OUTPATIENT
Start: 2025-03-12 | End: 2025-04-11

## 2025-03-12 RX ORDER — SODIUM CHLORIDE 9 MG/ML
5-250 INJECTION, SOLUTION INTRAVENOUS PRN
Status: DISCONTINUED | OUTPATIENT
Start: 2025-03-12 | End: 2025-03-13 | Stop reason: HOSPADM

## 2025-03-12 RX ORDER — ACETAMINOPHEN 325 MG/1
650 TABLET ORAL ONCE
Status: COMPLETED | OUTPATIENT
Start: 2025-03-12 | End: 2025-03-12

## 2025-03-12 RX ORDER — DIPHENHYDRAMINE HYDROCHLORIDE 50 MG/ML
50 INJECTION, SOLUTION INTRAMUSCULAR; INTRAVENOUS ONCE
Status: COMPLETED | OUTPATIENT
Start: 2025-03-12 | End: 2025-03-12

## 2025-03-12 RX ORDER — HEPARIN 100 UNIT/ML
500 SYRINGE INTRAVENOUS PRN
Status: DISCONTINUED | OUTPATIENT
Start: 2025-03-12 | End: 2025-03-13 | Stop reason: HOSPADM

## 2025-03-12 RX ORDER — SODIUM CHLORIDE 0.9 % (FLUSH) 0.9 %
5-40 SYRINGE (ML) INJECTION PRN
Status: DISCONTINUED | OUTPATIENT
Start: 2025-03-12 | End: 2025-03-13 | Stop reason: HOSPADM

## 2025-03-12 RX ORDER — PALONOSETRON 0.05 MG/ML
0.25 INJECTION, SOLUTION INTRAVENOUS ONCE
Status: COMPLETED | OUTPATIENT
Start: 2025-03-12 | End: 2025-03-12

## 2025-03-12 RX ADMIN — PALONOSETRON 0.25 MG: 0.05 INJECTION, SOLUTION INTRAVENOUS at 08:32

## 2025-03-12 RX ADMIN — ACETAMINOPHEN 650 MG: 325 TABLET ORAL at 08:31

## 2025-03-12 RX ADMIN — SODIUM CHLORIDE 150 MG: 0.9 INJECTION, SOLUTION INTRAVENOUS at 08:49

## 2025-03-12 RX ADMIN — SODIUM CHLORIDE 20 ML/HR: 0.9 INJECTION, SOLUTION INTRAVENOUS at 08:14

## 2025-03-12 RX ADMIN — DIPHENHYDRAMINE HYDROCHLORIDE 50 MG: 50 INJECTION INTRAMUSCULAR; INTRAVENOUS at 08:32

## 2025-03-12 RX ADMIN — VINCRISTINE SULFATE 2 MG: 1 INJECTION, SOLUTION INTRAVENOUS at 14:20

## 2025-03-12 RX ADMIN — CYCLOPHOSPHAMIDE 1560 MG: 2 INJECTION, POWDER, FOR SOLUTION INTRAVENOUS; ORAL at 13:29

## 2025-03-12 RX ADMIN — SODIUM CHLORIDE, PRESERVATIVE FREE 10 ML: 5 INJECTION INTRAVENOUS at 14:35

## 2025-03-12 RX ADMIN — DOXORUBICIN HYDROCHLORIDE 104 MG: 2 INJECTION, SOLUTION INTRAVENOUS at 14:06

## 2025-03-12 RX ADMIN — HEPARIN 500 UNITS: 100 SYRINGE at 14:35

## 2025-03-12 RX ADMIN — SODIUM CHLORIDE 800 MG: 9 INJECTION, SOLUTION INTRAVENOUS at 09:25

## 2025-03-12 ASSESSMENT — PAIN DESCRIPTION - LOCATION
LOCATION: BACK
LOCATION: BACK

## 2025-03-12 ASSESSMENT — PAIN DESCRIPTION - PAIN TYPE: TYPE: CHRONIC PAIN

## 2025-03-12 ASSESSMENT — PAIN DESCRIPTION - FREQUENCY: FREQUENCY: INTERMITTENT

## 2025-03-12 ASSESSMENT — PAIN DESCRIPTION - ONSET: ONSET: ON-GOING

## 2025-03-12 ASSESSMENT — PAIN - FUNCTIONAL ASSESSMENT
PAIN_FUNCTIONAL_ASSESSMENT: ACTIVITIES ARE NOT PREVENTED
PAIN_FUNCTIONAL_ASSESSMENT: ACTIVITIES ARE NOT PREVENTED

## 2025-03-12 ASSESSMENT — PAIN DESCRIPTION - DESCRIPTORS
DESCRIPTORS: ACHING
DESCRIPTORS: ACHING

## 2025-03-12 ASSESSMENT — PAIN SCALES - GENERAL
PAINLEVEL_OUTOF10: 5
PAINLEVEL_OUTOF10: 5

## 2025-03-12 ASSESSMENT — PAIN DESCRIPTION - ORIENTATION
ORIENTATION: LOWER
ORIENTATION: LOWER

## 2025-03-12 NOTE — PROGRESS NOTES
VAD de-accessed after 10ml NS and 5ml of heparin.  Band aid applied to site.  No sign of reaction to infusion at this time.  Pt ambulated out infusion center door without difficulty using a rolling walker.  Pt will be back tomorrow for fulphilla injection.

## 2025-03-13 ENCOUNTER — HOSPITAL ENCOUNTER (OUTPATIENT)
Dept: INFUSION THERAPY | Age: 65
Discharge: HOME | End: 2025-03-13
Payer: MEDICAID

## 2025-03-13 VITALS
DIASTOLIC BLOOD PRESSURE: 72 MMHG | TEMPERATURE: 97.9 F | OXYGEN SATURATION: 100 % | RESPIRATION RATE: 18 BRPM | SYSTOLIC BLOOD PRESSURE: 173 MMHG | HEART RATE: 87 BPM

## 2025-03-13 DIAGNOSIS — C83.398 DIFFUSE LARGE B-CELL LYMPHOMA OF SOLID ORGAN EXCLUDING SPLEEN: Primary | ICD-10-CM

## 2025-03-13 PROCEDURE — 6360000002 HC RX W HCPCS: Performed by: INTERNAL MEDICINE

## 2025-03-13 PROCEDURE — 96372 THER/PROPH/DIAG INJ SC/IM: CPT

## 2025-03-13 RX ADMIN — PEGFILGRASTIM-JMDB 6 MG: 6 INJECTION SUBCUTANEOUS at 15:03

## 2025-03-13 NOTE — PROGRESS NOTES
Fulphila injection given and no sign of reaction at this time.  Pt ambulated out infusion center using a rolling without difficulty in stable condition.  Pt aware of date and time of next treatment.

## 2025-03-23 DIAGNOSIS — R49.0 HOARSENESS OF VOICE: ICD-10-CM

## 2025-03-23 DIAGNOSIS — C83.30 DIFFUSE LARGE B-CELL LYMPHOMA, UNSPECIFIED BODY REGION (HCC): ICD-10-CM

## 2025-03-24 RX ORDER — HYDROCODONE BITARTRATE AND ACETAMINOPHEN 10; 325 MG/1; MG/1
1 TABLET ORAL EVERY 8 HOURS PRN
Qty: 63 TABLET | Refills: 0 | Status: SHIPPED | OUTPATIENT
Start: 2025-03-24 | End: 2025-04-14

## 2025-03-31 ENCOUNTER — OFFICE VISIT (OUTPATIENT)
Dept: FAMILY MEDICINE CLINIC | Age: 65
End: 2025-03-31
Payer: MEDICAID

## 2025-03-31 ENCOUNTER — RESULTS FOLLOW-UP (OUTPATIENT)
Dept: FAMILY MEDICINE CLINIC | Age: 65
End: 2025-03-31

## 2025-03-31 ENCOUNTER — HOSPITAL ENCOUNTER (OUTPATIENT)
Dept: INFUSION THERAPY | Age: 65
Discharge: HOME | End: 2025-03-31
Payer: MEDICAID

## 2025-03-31 ENCOUNTER — TELEPHONE (OUTPATIENT)
Dept: ONCOLOGY | Age: 65
End: 2025-03-31

## 2025-03-31 VITALS
SYSTOLIC BLOOD PRESSURE: 98 MMHG | RESPIRATION RATE: 16 BRPM | TEMPERATURE: 97 F | WEIGHT: 218.7 LBS | HEIGHT: 64 IN | OXYGEN SATURATION: 93 % | HEART RATE: 100 BPM | DIASTOLIC BLOOD PRESSURE: 52 MMHG | BODY MASS INDEX: 37.34 KG/M2

## 2025-03-31 DIAGNOSIS — R49.0 HOARSENESS: ICD-10-CM

## 2025-03-31 DIAGNOSIS — E78.5 HYPERLIPIDEMIA, UNSPECIFIED HYPERLIPIDEMIA TYPE: ICD-10-CM

## 2025-03-31 DIAGNOSIS — E03.9 ACQUIRED HYPOTHYROIDISM: Primary | ICD-10-CM

## 2025-03-31 DIAGNOSIS — C83.30 DIFFUSE LARGE B-CELL LYMPHOMA, UNSPECIFIED BODY REGION (HCC): Primary | ICD-10-CM

## 2025-03-31 DIAGNOSIS — E03.9 ACQUIRED HYPOTHYROIDISM: ICD-10-CM

## 2025-03-31 DIAGNOSIS — C83.38 DIFFUSE LARGE B-CELL LYMPHOMA OF LYMPH NODES OF MULTIPLE REGIONS (HCC): ICD-10-CM

## 2025-03-31 DIAGNOSIS — I10 ESSENTIAL HYPERTENSION: ICD-10-CM

## 2025-03-31 DIAGNOSIS — F41.9 ANXIETY: ICD-10-CM

## 2025-03-31 DIAGNOSIS — E78.1 HYPERTRIGLYCERIDEMIA: ICD-10-CM

## 2025-03-31 DIAGNOSIS — M54.16 LUMBAR NERVE ROOT COMPRESSION: ICD-10-CM

## 2025-03-31 DIAGNOSIS — C83.398 DIFFUSE LARGE B-CELL LYMPHOMA OF EXTRANODAL SITE: ICD-10-CM

## 2025-03-31 DIAGNOSIS — E55.9 VITAMIN D DEFICIENCY: Primary | ICD-10-CM

## 2025-03-31 LAB
ALBUMIN SERPL-MCNC: 3.7 G/DL (ref 3.5–5.2)
ALBUMIN/GLOB SERPL: 1.3 {RATIO} (ref 1–2.5)
ALP SERPL-CCNC: 89 U/L (ref 35–104)
ALT SERPL-CCNC: 22 U/L (ref 10–35)
ANION GAP SERPL CALCULATED.3IONS-SCNC: 11 MMOL/L (ref 9–16)
AST SERPL-CCNC: 20 U/L (ref 10–35)
BASOPHILS # BLD: 0.09 K/UL (ref 0–0.2)
BASOPHILS NFR BLD: 1 % (ref 0–2)
BILIRUB SERPL-MCNC: <0.2 MG/DL (ref 0–1.2)
BUN SERPL-MCNC: 13 MG/DL (ref 8–23)
BUN/CREAT SERPL: 22 (ref 9–20)
CALCIUM SERPL-MCNC: 9.1 MG/DL (ref 8.6–10.4)
CHLORIDE SERPL-SCNC: 105 MMOL/L (ref 98–107)
CO2 SERPL-SCNC: 26 MMOL/L (ref 20–31)
CREAT SERPL-MCNC: 0.6 MG/DL (ref 0.6–0.9)
EOSINOPHIL # BLD: 0.1 K/UL (ref 0–0.44)
EOSINOPHILS RELATIVE PERCENT: 1 % (ref 1–4)
ERYTHROCYTE [DISTWIDTH] IN BLOOD BY AUTOMATED COUNT: 16.3 % (ref 11.8–14.4)
GFR, ESTIMATED: >90 ML/MIN/1.73M2
GLUCOSE SERPL-MCNC: 113 MG/DL (ref 74–99)
HCT VFR BLD AUTO: 32.6 % (ref 36.3–47.1)
HGB BLD-MCNC: 10.5 G/DL (ref 11.9–15.1)
IMM GRANULOCYTES # BLD AUTO: 0.19 K/UL (ref 0–0.3)
IMM GRANULOCYTES NFR BLD: 2 %
LYMPHOCYTES NFR BLD: 1.19 K/UL (ref 1.1–3.7)
LYMPHOCYTES RELATIVE PERCENT: 14 % (ref 24–43)
MCH RBC QN AUTO: 30.4 PG (ref 25.2–33.5)
MCHC RBC AUTO-ENTMCNC: 32.2 G/DL (ref 25.2–33.5)
MCV RBC AUTO: 94.5 FL (ref 82.6–102.9)
MONOCYTES NFR BLD: 0.98 K/UL (ref 0.1–1.2)
MONOCYTES NFR BLD: 11 % (ref 3–12)
NEUTROPHILS NFR BLD: 71 % (ref 36–65)
NEUTS SEG NFR BLD: 6.29 K/UL (ref 1.5–8.1)
NRBC BLD-RTO: 1.7 PER 100 WBC
PLATELET # BLD AUTO: 348 K/UL (ref 138–453)
PMV BLD AUTO: 9.1 FL (ref 8.1–13.5)
POTASSIUM SERPL-SCNC: 3.9 MMOL/L (ref 3.7–5.3)
PROT SERPL-MCNC: 6.5 G/DL (ref 6.6–8.7)
RBC # BLD AUTO: 3.45 M/UL (ref 3.95–5.11)
RBC # BLD: ABNORMAL 10*6/UL
SODIUM SERPL-SCNC: 142 MMOL/L (ref 136–145)
WBC OTHER # BLD: 8.8 K/UL (ref 3.5–11.3)

## 2025-03-31 PROCEDURE — 36591 DRAW BLOOD OFF VENOUS DEVICE: CPT

## 2025-03-31 PROCEDURE — 99214 OFFICE O/P EST MOD 30 MIN: CPT

## 2025-03-31 PROCEDURE — 80053 COMPREHEN METABOLIC PANEL: CPT

## 2025-03-31 PROCEDURE — 3078F DIAST BP <80 MM HG: CPT | Performed by: FAMILY MEDICINE

## 2025-03-31 PROCEDURE — 6360000002 HC RX W HCPCS: Performed by: INTERNAL MEDICINE

## 2025-03-31 PROCEDURE — 36593 DECLOT VASCULAR DEVICE: CPT

## 2025-03-31 PROCEDURE — 85025 COMPLETE CBC W/AUTO DIFF WBC: CPT

## 2025-03-31 PROCEDURE — 99214 OFFICE O/P EST MOD 30 MIN: CPT | Performed by: FAMILY MEDICINE

## 2025-03-31 PROCEDURE — 2500000003 HC RX 250 WO HCPCS: Performed by: INTERNAL MEDICINE

## 2025-03-31 PROCEDURE — 3074F SYST BP LT 130 MM HG: CPT | Performed by: FAMILY MEDICINE

## 2025-03-31 RX ORDER — HEPARIN 100 UNIT/ML
500 SYRINGE INTRAVENOUS PRN
OUTPATIENT
Start: 2025-03-31

## 2025-03-31 RX ORDER — SODIUM CHLORIDE 9 MG/ML
25 INJECTION, SOLUTION INTRAVENOUS PRN
OUTPATIENT
Start: 2025-03-31

## 2025-03-31 RX ORDER — EZETIMIBE 10 MG/1
10 TABLET ORAL DAILY
Qty: 90 TABLET | Refills: 3 | Status: SHIPPED | OUTPATIENT
Start: 2025-03-31

## 2025-03-31 RX ORDER — PRAVASTATIN SODIUM 80 MG/1
80 TABLET ORAL DAILY
Qty: 90 TABLET | Refills: 3 | Status: SHIPPED | OUTPATIENT
Start: 2025-03-31

## 2025-03-31 RX ORDER — OMEPRAZOLE 20 MG/1
20 CAPSULE, DELAYED RELEASE ORAL
Qty: 30 CAPSULE | Refills: 0 | Status: SHIPPED | OUTPATIENT
Start: 2025-03-31

## 2025-03-31 RX ORDER — SODIUM CHLORIDE 0.9 % (FLUSH) 0.9 %
5-40 SYRINGE (ML) INJECTION PRN
OUTPATIENT
Start: 2025-03-31

## 2025-03-31 RX ORDER — ESCITALOPRAM OXALATE 10 MG/1
10 TABLET ORAL DAILY
Qty: 90 TABLET | Refills: 3 | Status: SHIPPED | OUTPATIENT
Start: 2025-03-31

## 2025-03-31 RX ORDER — HEPARIN 100 UNIT/ML
500 SYRINGE INTRAVENOUS PRN
Status: DISCONTINUED | OUTPATIENT
Start: 2025-03-31 | End: 2025-04-01 | Stop reason: HOSPADM

## 2025-03-31 RX ORDER — ONDANSETRON 4 MG/1
4 TABLET, ORALLY DISINTEGRATING ORAL EVERY 8 HOURS PRN
COMMUNITY

## 2025-03-31 RX ORDER — SODIUM CHLORIDE 0.9 % (FLUSH) 0.9 %
5-40 SYRINGE (ML) INJECTION PRN
Status: DISCONTINUED | OUTPATIENT
Start: 2025-03-31 | End: 2025-04-01 | Stop reason: HOSPADM

## 2025-03-31 RX ORDER — LEVOTHYROXINE SODIUM 200 UG/1
TABLET ORAL
Qty: 90 TABLET | Refills: 0 | Status: SHIPPED | OUTPATIENT
Start: 2025-03-31

## 2025-03-31 RX ADMIN — HEPARIN 500 UNITS: 100 SYRINGE at 12:43

## 2025-03-31 RX ADMIN — SODIUM CHLORIDE, PRESERVATIVE FREE 10 ML: 5 INJECTION INTRAVENOUS at 12:43

## 2025-03-31 RX ADMIN — WATER 2 MG: 1 INJECTION INTRAMUSCULAR; INTRAVENOUS; SUBCUTANEOUS at 11:55

## 2025-03-31 NOTE — TELEPHONE ENCOUNTER
Name: Therese Mckeon  : 1960  MRN: [id number]    Oncology Navigation Follow-Up Note    Contact Type:   infusion room    Notes:   Writer met with patient and friend while in infusion room for treatment today. She received the resources folder with writer's contact information the last time she was in infusion.  She denies any transportation or financial needs at present. Encouraged her to call if she has any questions/concerns. Understanding verbalized.   Navigator following for continuity of care.        Electronically signed by Yelena Galvan RN on 3/31/2025 at 1:03 PM

## 2025-03-31 NOTE — PROGRESS NOTES
[facility]  [address] [place] [address]  [phone]      Therese Mckeon is a 65 y.o. female who presents today for her medical conditions/complaints as noted below.  Therese Mckeon is c/o of 6 mo f/u - specialist appts      HPI:     History of Present Illness  The patient is a 65-year-old female who presents today for follow-up of hypothyroidism and a recent lymphoma diagnosis.    She has been diagnosed with lymphoma and is currently undergoing chemotherapy, with her second session scheduled for tomorrow. The treatment plan involves sessions every 3 weeks, totaling up to 6 sessions. She reports hair loss but has not experienced any vomiting. Despite feeling hungry, she finds most foods unappealing. She does not experience a metallic taste in her mouth but has been dealing with dry mouth for the past 2 months. She has noticed a reduction in foot swelling after 2 months. Her fluid intake is low, and she prefers to consume beverages at room temperature as cold drinks upset her stomach. She has not felt any swollen glands. She has been on Decadron until the initiation of her chemotherapy and prednisone for 5 days starting from the first day of her chemotherapy.     Zofran has been used as needed for nausea, which has been effective, allowing her to eat more than half a biscuit from [employer] this morning. She has been able to eat only small amounts at a time. The diuretic has not been used as much recently. No specific actions were taken to reduce the swelling, which seems to have resolved on its own. She has not been drinking much due to stomach upset. Medications include losartan 50 mg and Zetia for cholesterol management, Lexapro 10 mg, and pravastatin 80 mg. She has been taking a multivitamin and calcium supplement. A handicap sticker has been used for security due to a tendency to fall, which has been in use since her cruise in January 2025. Difficulty getting

## 2025-03-31 NOTE — PROGRESS NOTES
Patient arrived for Lab draw from port.  Port flushed easily but with no blood return.  Altepase used with successful brisk blood return.  Lab draw completed without complication.  Patient tolerated well.  Patient left infusion center with all belongings and walker to aid with gate.  Next appt set for tomorrow.

## 2025-04-01 ENCOUNTER — OFFICE VISIT (OUTPATIENT)
Dept: ONCOLOGY | Age: 65
End: 2025-04-01
Payer: MEDICAID

## 2025-04-01 ENCOUNTER — HOSPITAL ENCOUNTER (OUTPATIENT)
Dept: INFUSION THERAPY | Age: 65
Discharge: HOME | End: 2025-04-01
Payer: MEDICAID

## 2025-04-01 VITALS
DIASTOLIC BLOOD PRESSURE: 72 MMHG | WEIGHT: 217.4 LBS | BODY MASS INDEX: 37.11 KG/M2 | OXYGEN SATURATION: 94 % | HEART RATE: 70 BPM | TEMPERATURE: 97 F | HEIGHT: 64 IN | SYSTOLIC BLOOD PRESSURE: 132 MMHG | RESPIRATION RATE: 16 BRPM

## 2025-04-01 VITALS
BODY MASS INDEX: 38.52 KG/M2 | SYSTOLIC BLOOD PRESSURE: 132 MMHG | HEIGHT: 63 IN | WEIGHT: 217.4 LBS | HEART RATE: 70 BPM | TEMPERATURE: 97 F | DIASTOLIC BLOOD PRESSURE: 72 MMHG | OXYGEN SATURATION: 94 %

## 2025-04-01 DIAGNOSIS — R49.0 HOARSENESS OF VOICE: ICD-10-CM

## 2025-04-01 DIAGNOSIS — C83.398 DIFFUSE LARGE B-CELL LYMPHOMA OF SOLID ORGAN EXCLUDING SPLEEN: Primary | ICD-10-CM

## 2025-04-01 DIAGNOSIS — Z51.11 CHEMOTHERAPY MANAGEMENT, ENCOUNTER FOR: ICD-10-CM

## 2025-04-01 PROCEDURE — 3075F SYST BP GE 130 - 139MM HG: CPT | Performed by: INTERNAL MEDICINE

## 2025-04-01 PROCEDURE — 2580000003 HC RX 258: Performed by: INTERNAL MEDICINE

## 2025-04-01 PROCEDURE — 6360000002 HC RX W HCPCS: Performed by: INTERNAL MEDICINE

## 2025-04-01 PROCEDURE — 96367 TX/PROPH/DG ADDL SEQ IV INF: CPT

## 2025-04-01 PROCEDURE — 6370000000 HC RX 637 (ALT 250 FOR IP): Performed by: INTERNAL MEDICINE

## 2025-04-01 PROCEDURE — 96413 CHEMO IV INFUSION 1 HR: CPT

## 2025-04-01 PROCEDURE — 2500000003 HC RX 250 WO HCPCS: Performed by: INTERNAL MEDICINE

## 2025-04-01 PROCEDURE — 96415 CHEMO IV INFUSION ADDL HR: CPT

## 2025-04-01 PROCEDURE — 96417 CHEMO IV INFUS EACH ADDL SEQ: CPT

## 2025-04-01 PROCEDURE — 99213 OFFICE O/P EST LOW 20 MIN: CPT | Performed by: INTERNAL MEDICINE

## 2025-04-01 PROCEDURE — 99214 OFFICE O/P EST MOD 30 MIN: CPT | Performed by: INTERNAL MEDICINE

## 2025-04-01 PROCEDURE — 3078F DIAST BP <80 MM HG: CPT | Performed by: INTERNAL MEDICINE

## 2025-04-01 PROCEDURE — 96411 CHEMO IV PUSH ADDL DRUG: CPT

## 2025-04-01 PROCEDURE — 96375 TX/PRO/DX INJ NEW DRUG ADDON: CPT

## 2025-04-01 RX ORDER — PROCHLORPERAZINE EDISYLATE 5 MG/ML
5 INJECTION INTRAMUSCULAR; INTRAVENOUS
Status: CANCELLED | OUTPATIENT
Start: 2025-04-02

## 2025-04-01 RX ORDER — DIPHENHYDRAMINE HYDROCHLORIDE 50 MG/ML
50 INJECTION, SOLUTION INTRAMUSCULAR; INTRAVENOUS ONCE
Status: COMPLETED | OUTPATIENT
Start: 2025-04-01 | End: 2025-04-01

## 2025-04-01 RX ORDER — SODIUM CHLORIDE 9 MG/ML
INJECTION, SOLUTION INTRAVENOUS CONTINUOUS
Status: CANCELLED | OUTPATIENT
Start: 2025-04-02

## 2025-04-01 RX ORDER — HYDROCORTISONE SODIUM SUCCINATE 100 MG/2ML
100 INJECTION INTRAMUSCULAR; INTRAVENOUS
OUTPATIENT
Start: 2025-04-23

## 2025-04-01 RX ORDER — ONDANSETRON 2 MG/ML
8 INJECTION INTRAMUSCULAR; INTRAVENOUS
Status: CANCELLED | OUTPATIENT
Start: 2025-04-02

## 2025-04-01 RX ORDER — HEPARIN SODIUM (PORCINE) LOCK FLUSH IV SOLN 100 UNIT/ML 100 UNIT/ML
500 SOLUTION INTRAVENOUS PRN
OUTPATIENT
Start: 2025-04-23

## 2025-04-01 RX ORDER — FAMOTIDINE 10 MG/ML
20 INJECTION, SOLUTION INTRAVENOUS
Status: CANCELLED | OUTPATIENT
Start: 2025-04-02

## 2025-04-01 RX ORDER — HYDROCORTISONE SODIUM SUCCINATE 100 MG/2ML
100 INJECTION INTRAMUSCULAR; INTRAVENOUS
Status: CANCELLED | OUTPATIENT
Start: 2025-04-02

## 2025-04-01 RX ORDER — SODIUM CHLORIDE 0.9 % (FLUSH) 0.9 %
5-40 SYRINGE (ML) INJECTION PRN
Status: CANCELLED | OUTPATIENT
Start: 2025-04-02

## 2025-04-01 RX ORDER — DOXORUBICIN HYDROCHLORIDE 2 MG/ML
50 INJECTION, SOLUTION INTRAVENOUS ONCE
Status: COMPLETED | OUTPATIENT
Start: 2025-04-01 | End: 2025-04-01

## 2025-04-01 RX ORDER — SODIUM CHLORIDE 0.9 % (FLUSH) 0.9 %
5-40 SYRINGE (ML) INJECTION PRN
OUTPATIENT
Start: 2025-04-23

## 2025-04-01 RX ORDER — HEPARIN SODIUM (PORCINE) LOCK FLUSH IV SOLN 100 UNIT/ML 100 UNIT/ML
500 SOLUTION INTRAVENOUS PRN
Status: CANCELLED | OUTPATIENT
Start: 2025-04-02

## 2025-04-01 RX ORDER — PROCHLORPERAZINE EDISYLATE 5 MG/ML
5 INJECTION INTRAMUSCULAR; INTRAVENOUS
OUTPATIENT
Start: 2025-04-23

## 2025-04-01 RX ORDER — DIPHENHYDRAMINE HYDROCHLORIDE 50 MG/ML
50 INJECTION, SOLUTION INTRAMUSCULAR; INTRAVENOUS ONCE
Status: CANCELLED | OUTPATIENT
Start: 2025-04-02 | End: 2025-04-02

## 2025-04-01 RX ORDER — DIPHENHYDRAMINE HYDROCHLORIDE 50 MG/ML
50 INJECTION, SOLUTION INTRAMUSCULAR; INTRAVENOUS
Status: CANCELLED | OUTPATIENT
Start: 2025-04-02

## 2025-04-01 RX ORDER — MEPERIDINE HYDROCHLORIDE 50 MG/ML
12.5 INJECTION INTRAMUSCULAR; INTRAVENOUS; SUBCUTANEOUS PRN
Status: CANCELLED | OUTPATIENT
Start: 2025-04-02

## 2025-04-01 RX ORDER — ACETAMINOPHEN 325 MG/1
650 TABLET ORAL ONCE
Status: COMPLETED | OUTPATIENT
Start: 2025-04-01 | End: 2025-04-01

## 2025-04-01 RX ORDER — PALONOSETRON 0.05 MG/ML
0.25 INJECTION, SOLUTION INTRAVENOUS ONCE
Status: COMPLETED | OUTPATIENT
Start: 2025-04-01 | End: 2025-04-01

## 2025-04-01 RX ORDER — ALBUTEROL SULFATE 90 UG/1
4 INHALANT RESPIRATORY (INHALATION) PRN
OUTPATIENT
Start: 2025-04-23

## 2025-04-01 RX ORDER — DIPHENHYDRAMINE HYDROCHLORIDE 50 MG/ML
50 INJECTION, SOLUTION INTRAMUSCULAR; INTRAVENOUS ONCE
OUTPATIENT
Start: 2025-04-23 | End: 2025-04-23

## 2025-04-01 RX ORDER — SODIUM CHLORIDE 9 MG/ML
5-250 INJECTION, SOLUTION INTRAVENOUS PRN
OUTPATIENT
Start: 2025-04-23

## 2025-04-01 RX ORDER — SODIUM CHLORIDE 0.9 % (FLUSH) 0.9 %
5-40 SYRINGE (ML) INJECTION PRN
Status: DISCONTINUED | OUTPATIENT
Start: 2025-04-01 | End: 2025-04-02 | Stop reason: HOSPADM

## 2025-04-01 RX ORDER — EPINEPHRINE 1 MG/ML
0.3 INJECTION, SOLUTION, CONCENTRATE INTRAVENOUS PRN
Status: CANCELLED | OUTPATIENT
Start: 2025-04-02

## 2025-04-01 RX ORDER — PALONOSETRON 0.05 MG/ML
0.25 INJECTION, SOLUTION INTRAVENOUS ONCE
Status: CANCELLED | OUTPATIENT
Start: 2025-04-02 | End: 2025-04-02

## 2025-04-01 RX ORDER — DIPHENHYDRAMINE HYDROCHLORIDE 50 MG/ML
50 INJECTION, SOLUTION INTRAMUSCULAR; INTRAVENOUS
OUTPATIENT
Start: 2025-04-23

## 2025-04-01 RX ORDER — ACETAMINOPHEN 325 MG/1
650 TABLET ORAL
Status: CANCELLED | OUTPATIENT
Start: 2025-04-02

## 2025-04-01 RX ORDER — EPINEPHRINE 1 MG/ML
0.3 INJECTION, SOLUTION, CONCENTRATE INTRAVENOUS PRN
OUTPATIENT
Start: 2025-04-23

## 2025-04-01 RX ORDER — ALBUTEROL SULFATE 90 UG/1
4 INHALANT RESPIRATORY (INHALATION) PRN
Status: CANCELLED | OUTPATIENT
Start: 2025-04-02

## 2025-04-01 RX ORDER — SODIUM CHLORIDE 9 MG/ML
5-250 INJECTION, SOLUTION INTRAVENOUS PRN
Status: CANCELLED | OUTPATIENT
Start: 2025-04-02

## 2025-04-01 RX ORDER — MEPERIDINE HYDROCHLORIDE 50 MG/ML
12.5 INJECTION INTRAMUSCULAR; INTRAVENOUS; SUBCUTANEOUS PRN
OUTPATIENT
Start: 2025-04-23

## 2025-04-01 RX ORDER — DOXORUBICIN HYDROCHLORIDE 2 MG/ML
50 INJECTION, SOLUTION INTRAVENOUS ONCE
OUTPATIENT
Start: 2025-04-23 | End: 2025-04-23

## 2025-04-01 RX ORDER — ACETAMINOPHEN 325 MG/1
650 TABLET ORAL
OUTPATIENT
Start: 2025-04-23

## 2025-04-01 RX ORDER — ACETAMINOPHEN 325 MG/1
650 TABLET ORAL ONCE
Status: CANCELLED | OUTPATIENT
Start: 2025-04-02 | End: 2025-04-02

## 2025-04-01 RX ORDER — HEPARIN 100 UNIT/ML
500 SYRINGE INTRAVENOUS PRN
Status: DISCONTINUED | OUTPATIENT
Start: 2025-04-01 | End: 2025-04-02 | Stop reason: HOSPADM

## 2025-04-01 RX ORDER — PALONOSETRON 0.05 MG/ML
0.25 INJECTION, SOLUTION INTRAVENOUS ONCE
OUTPATIENT
Start: 2025-04-23 | End: 2025-04-23

## 2025-04-01 RX ORDER — SODIUM CHLORIDE 9 MG/ML
INJECTION, SOLUTION INTRAVENOUS CONTINUOUS
OUTPATIENT
Start: 2025-04-23

## 2025-04-01 RX ORDER — DOXORUBICIN HYDROCHLORIDE 2 MG/ML
50 INJECTION, SOLUTION INTRAVENOUS ONCE
Status: CANCELLED | OUTPATIENT
Start: 2025-04-02 | End: 2025-04-02

## 2025-04-01 RX ORDER — ONDANSETRON 2 MG/ML
8 INJECTION INTRAMUSCULAR; INTRAVENOUS
OUTPATIENT
Start: 2025-04-23

## 2025-04-01 RX ORDER — FAMOTIDINE 10 MG/ML
20 INJECTION, SOLUTION INTRAVENOUS
OUTPATIENT
Start: 2025-04-23

## 2025-04-01 RX ORDER — ACETAMINOPHEN 325 MG/1
650 TABLET ORAL ONCE
OUTPATIENT
Start: 2025-04-23 | End: 2025-04-23

## 2025-04-01 RX ORDER — SODIUM CHLORIDE 9 MG/ML
5-250 INJECTION, SOLUTION INTRAVENOUS PRN
Status: DISCONTINUED | OUTPATIENT
Start: 2025-04-01 | End: 2025-04-02 | Stop reason: HOSPADM

## 2025-04-01 RX ADMIN — ACETAMINOPHEN 650 MG: 325 TABLET ORAL at 09:22

## 2025-04-01 RX ADMIN — SODIUM CHLORIDE, PRESERVATIVE FREE 10 ML: 5 INJECTION INTRAVENOUS at 14:18

## 2025-04-01 RX ADMIN — CYCLOPHOSPHAMIDE 1560 MG: 500 INJECTION, POWDER, FOR SOLUTION INTRAVENOUS; ORAL at 13:17

## 2025-04-01 RX ADMIN — SODIUM CHLORIDE 800 MG: 0.9 INJECTION, SOLUTION INTRAVENOUS at 10:12

## 2025-04-01 RX ADMIN — HEPARIN 500 UNITS: 100 SYRINGE at 14:18

## 2025-04-01 RX ADMIN — DIPHENHYDRAMINE HYDROCHLORIDE 50 MG: 50 INJECTION INTRAMUSCULAR; INTRAVENOUS at 09:23

## 2025-04-01 RX ADMIN — SODIUM CHLORIDE 20 ML/HR: 0.9 INJECTION, SOLUTION INTRAVENOUS at 09:10

## 2025-04-01 RX ADMIN — PALONOSETRON 0.25 MG: 0.05 INJECTION, SOLUTION INTRAVENOUS at 09:22

## 2025-04-01 RX ADMIN — SODIUM CHLORIDE 150 MG: 0.9 INJECTION, SOLUTION INTRAVENOUS at 09:30

## 2025-04-01 RX ADMIN — DOXORUBICIN HYDROCHLORIDE 104 MG: 2 INJECTION, SOLUTION INTRAVENOUS at 13:52

## 2025-04-01 RX ADMIN — VINCRISTINE SULFATE 2 MG: 1 INJECTION, SOLUTION INTRAVENOUS at 14:06

## 2025-04-01 ASSESSMENT — PAIN SCALES - GENERAL: PAINLEVEL_OUTOF10: 0

## 2025-04-01 ASSESSMENT — PAIN - FUNCTIONAL ASSESSMENT: PAIN_FUNCTIONAL_ASSESSMENT: ACTIVITIES ARE NOT PREVENTED

## 2025-04-01 NOTE — PROGRESS NOTES
VAD de-accessed after 10ml NS and 5ml of heparin.  Band aid applied to site.  No sign of reaction to infusion at this time.  Pt ambulated out infusion center door without difficulty.  Pt going to Dr. Mirza's office to have appt.

## 2025-04-02 ENCOUNTER — HOSPITAL ENCOUNTER (OUTPATIENT)
Dept: INFUSION THERAPY | Age: 65
Discharge: HOME | End: 2025-04-02
Payer: MEDICAID

## 2025-04-02 VITALS
DIASTOLIC BLOOD PRESSURE: 81 MMHG | RESPIRATION RATE: 16 BRPM | HEART RATE: 96 BPM | SYSTOLIC BLOOD PRESSURE: 159 MMHG | TEMPERATURE: 97.2 F | OXYGEN SATURATION: 97 %

## 2025-04-02 DIAGNOSIS — C83.398 DIFFUSE LARGE B-CELL LYMPHOMA OF SOLID ORGAN EXCLUDING SPLEEN: Primary | ICD-10-CM

## 2025-04-02 PROCEDURE — 6360000002 HC RX W HCPCS: Performed by: INTERNAL MEDICINE

## 2025-04-02 PROCEDURE — 96372 THER/PROPH/DIAG INJ SC/IM: CPT

## 2025-04-02 RX ADMIN — PEGFILGRASTIM-JMDB 6 MG: 6 INJECTION SUBCUTANEOUS at 15:12

## 2025-04-02 NOTE — PROGRESS NOTES
Therese Mckeon                                                                                                                  4/1/2025  MRN:   [id number]  YOB: 1960  PCP:                           Mary Schneider DO  Referring Physician: No ref. provider found  Treating Physician Name: MITUL RICE MD      Reason for visit:  Chief Complaint   Patient presents with    Lymphoma     4wk f/u, labs prior   Toxicity check.  Discussed treatment plan.    Current problems:  Diffuse large B-cell lymphoma, germinal center type, NOS  -Molecular testing could not be performed due to decalcified specimen  Tobacco dependence  Back pain  Medical comorbidities: Dyslipidemia hypertension hypothyroidism    Active and recent treatments:  R-CHOP    Interval history:  Patient presents to the clinic for a follow-up visit and for toxicity check and to review results of her blood work-up.  Patient has been tolerating treatment without any unexpected or severe side effects.  Denies hospitalization or ER visit.  Patient has lost weight.  Appetite is poor. Nausea is controlled.  Leg swelling is improved.  Back pain has improved  During this visit patient's allergy, social, medical, surgical history and medications were reviewed and updated.    Summary of Case/History:  Therese Mckeon a 65 y.o.female is a patient with lytic lesions of the spine concerning for metastatic disease presents to the clinic to establish care and for further workup and evaluation.  Patient was initially referred to neurosurgery because of significant back pain.  Patient subsequently had MRI of the lumbar spine which showed lytic process at L4 with soft tissue component extending into the spinal canal.  Patient has been started on Decadron.  Her back pain has improved.  Patient yesterday had a CT chest and pelvis.  No obvious finding suggestive of a primary tumor noted on the CT chest and pelvis.  MRI cervical and thoracic spine show

## 2025-04-08 ENCOUNTER — TELEPHONE (OUTPATIENT)
Dept: ONCOLOGY | Age: 65
End: 2025-04-08

## 2025-04-08 DIAGNOSIS — R11.0 CHEMOTHERAPY-INDUCED NAUSEA: Primary | ICD-10-CM

## 2025-04-08 DIAGNOSIS — T45.1X5A CHEMOTHERAPY-INDUCED NAUSEA: Primary | ICD-10-CM

## 2025-04-08 NOTE — TELEPHONE ENCOUNTER
Name: Therese Mckeon  : 1960  MRN: [id number]    Oncology Navigation Follow-Up Note    Contact Type:  Telephone    Notes:   Patient received cycle two of chemo 25. Phone call to assess navigation needs. She states she continues to struggle with eating. This started at least one week before treatment started. Her stomach hurts after eating. She has tried Zofran but that doesn't help. PCP started her on omeprazole but she is not getting relief. The only time she felt relief was after treatment when she received the IV med for nausea. She is struggling with diarrhea but hasn't taken anything for it. Instructed to use imodium OTC. She verbalized understanding.     Food is starting to taste bad. Discussed eating a variety of things to find something that appeals to her and is tolerable. She is supplementing with protein shakes.     Dr. Mirza updated.  Per Dr. Mirza, Compazine and have patient come to infusion for fluids and labs 25.  Luis in Infusion notified and scheduled her for 8 am. Patient notified of compazine and infusion appt. She is agreeable.  Compazine script pended to Dr. Mirza for signature.      Electronically signed by Yelena Galvan RN on 2025 at 8:51 AM

## 2025-04-09 ENCOUNTER — HOSPITAL ENCOUNTER (OUTPATIENT)
Dept: INFUSION THERAPY | Age: 65
Discharge: HOME | End: 2025-04-09
Payer: MEDICAID

## 2025-04-09 VITALS
RESPIRATION RATE: 16 BRPM | DIASTOLIC BLOOD PRESSURE: 71 MMHG | TEMPERATURE: 97.2 F | SYSTOLIC BLOOD PRESSURE: 109 MMHG | HEART RATE: 107 BPM

## 2025-04-09 DIAGNOSIS — C83.398 DIFFUSE LARGE B-CELL LYMPHOMA OF EXTRANODAL SITE EXCLUDING SPLEEN AND OTHER SOLID ORGANS: Primary | ICD-10-CM

## 2025-04-09 LAB
ALBUMIN SERPL-MCNC: 4 G/DL (ref 3.5–5.2)
ALBUMIN/GLOB SERPL: 1.5 {RATIO} (ref 1–2.5)
ALP SERPL-CCNC: 93 U/L (ref 35–104)
ALT SERPL-CCNC: 20 U/L (ref 10–35)
ANION GAP SERPL CALCULATED.3IONS-SCNC: 14 MMOL/L (ref 9–16)
AST SERPL-CCNC: 12 U/L (ref 10–35)
BASOPHILS # BLD: 0 K/UL (ref 0–0.2)
BASOPHILS NFR BLD: 0 % (ref 0–1)
BILIRUB SERPL-MCNC: 0.3 MG/DL (ref 0–1.2)
BUN SERPL-MCNC: 27 MG/DL (ref 8–23)
BUN/CREAT SERPL: 45 (ref 9–20)
CALCIUM SERPL-MCNC: 9.7 MG/DL (ref 8.6–10.4)
CHLORIDE SERPL-SCNC: 102 MMOL/L (ref 98–107)
CO2 SERPL-SCNC: 24 MMOL/L (ref 20–31)
CREAT SERPL-MCNC: 0.6 MG/DL (ref 0.6–0.9)
EOSINOPHIL # BLD: 0.09 K/UL (ref 0–0.4)
EOSINOPHILS RELATIVE PERCENT: 4 % (ref 1–7)
ERYTHROCYTE [DISTWIDTH] IN BLOOD BY AUTOMATED COUNT: 15.2 % (ref 11.8–14.4)
GFR, ESTIMATED: >90 ML/MIN/1.73M2
GLUCOSE SERPL-MCNC: 129 MG/DL (ref 74–99)
HCT VFR BLD AUTO: 34 % (ref 36.3–47.1)
HGB BLD-MCNC: 11.2 G/DL (ref 11.9–15.1)
IMM GRANULOCYTES # BLD AUTO: 0.09 K/UL (ref 0–0.3)
IMM GRANULOCYTES NFR BLD: 4 %
LYMPHOCYTES NFR BLD: 0.84 K/UL (ref 1–4.8)
LYMPHOCYTES RELATIVE PERCENT: 38 % (ref 16–46)
MCH RBC QN AUTO: 30.7 PG (ref 25.2–33.5)
MCHC RBC AUTO-ENTMCNC: 32.9 G/DL (ref 25.2–33.5)
MCV RBC AUTO: 93.2 FL (ref 82.6–102.9)
MONOCYTES NFR BLD: 0.11 K/UL (ref 0.1–1.2)
MONOCYTES NFR BLD: 5 % (ref 4–11)
MORPHOLOGY: ABNORMAL
MORPHOLOGY: ABNORMAL
NEUTROPHILS NFR BLD: 49 % (ref 43–77)
NEUTS SEG NFR BLD: 1.07 K/UL (ref 1.5–8.1)
NRBC BLD-RTO: 4 PER 100 WBC
PLATELET # BLD AUTO: 158 K/UL (ref 138–453)
PMV BLD AUTO: 10.7 FL (ref 8.1–13.5)
POTASSIUM SERPL-SCNC: 3.9 MMOL/L (ref 3.7–5.3)
PROT SERPL-MCNC: 6.6 G/DL (ref 6.6–8.7)
RBC # BLD AUTO: 3.65 M/UL (ref 3.95–5.11)
SODIUM SERPL-SCNC: 140 MMOL/L (ref 136–145)
WBC OTHER # BLD: 2.2 K/UL (ref 3.5–11.3)

## 2025-04-09 PROCEDURE — 2580000003 HC RX 258: Performed by: INTERNAL MEDICINE

## 2025-04-09 PROCEDURE — 96361 HYDRATE IV INFUSION ADD-ON: CPT

## 2025-04-09 PROCEDURE — 2500000003 HC RX 250 WO HCPCS: Performed by: INTERNAL MEDICINE

## 2025-04-09 PROCEDURE — 96360 HYDRATION IV INFUSION INIT: CPT

## 2025-04-09 PROCEDURE — 80053 COMPREHEN METABOLIC PANEL: CPT

## 2025-04-09 PROCEDURE — 85025 COMPLETE CBC W/AUTO DIFF WBC: CPT

## 2025-04-09 PROCEDURE — 6360000002 HC RX W HCPCS: Performed by: INTERNAL MEDICINE

## 2025-04-09 RX ORDER — SODIUM CHLORIDE 9 MG/ML
5-250 INJECTION, SOLUTION INTRAVENOUS PRN
OUTPATIENT
Start: 2025-04-09

## 2025-04-09 RX ORDER — 0.9 % SODIUM CHLORIDE 0.9 %
1000 INTRAVENOUS SOLUTION INTRAVENOUS ONCE
Status: CANCELLED | OUTPATIENT
Start: 2025-04-09 | End: 2025-04-09

## 2025-04-09 RX ORDER — SODIUM CHLORIDE 0.9 % (FLUSH) 0.9 %
5-40 SYRINGE (ML) INJECTION PRN
Status: DISCONTINUED | OUTPATIENT
Start: 2025-04-09 | End: 2025-04-10 | Stop reason: HOSPADM

## 2025-04-09 RX ORDER — SODIUM CHLORIDE 0.9 % (FLUSH) 0.9 %
5-40 SYRINGE (ML) INJECTION PRN
OUTPATIENT
Start: 2025-04-09

## 2025-04-09 RX ORDER — HEPARIN 100 UNIT/ML
500 SYRINGE INTRAVENOUS PRN
OUTPATIENT
Start: 2025-04-09

## 2025-04-09 RX ORDER — HEPARIN 100 UNIT/ML
500 SYRINGE INTRAVENOUS PRN
Status: DISCONTINUED | OUTPATIENT
Start: 2025-04-09 | End: 2025-04-10 | Stop reason: HOSPADM

## 2025-04-09 RX ORDER — 0.9 % SODIUM CHLORIDE 0.9 %
1000 INTRAVENOUS SOLUTION INTRAVENOUS ONCE
Status: COMPLETED | OUTPATIENT
Start: 2025-04-09 | End: 2025-04-09

## 2025-04-09 RX ADMIN — HEPARIN 500 UNITS: 100 SYRINGE at 10:12

## 2025-04-09 RX ADMIN — SODIUM CHLORIDE 1000 ML: 0.9 INJECTION, SOLUTION INTRAVENOUS at 08:05

## 2025-04-09 RX ADMIN — SODIUM CHLORIDE, PRESERVATIVE FREE 10 ML: 5 INJECTION INTRAVENOUS at 10:12

## 2025-04-09 NOTE — PROGRESS NOTES
Patient arrived for Hydration and lab draw.  VSS as charted.  Port accessed without complication. Lab draw from port access.  Patient tolerated infusion well.  Patient left infusion center with all belongings and steady gate with walker.

## 2025-04-10 RX ORDER — PROCHLORPERAZINE MALEATE 10 MG
5 TABLET ORAL EVERY 6 HOURS PRN
Qty: 80 TABLET | Refills: 0 | Status: SHIPPED | OUTPATIENT
Start: 2025-04-10

## 2025-04-17 ENCOUNTER — TELEPHONE (OUTPATIENT)
Dept: ONCOLOGY | Age: 65
End: 2025-04-17

## 2025-04-17 NOTE — TELEPHONE ENCOUNTER
Name: Therese Mckeon  : 1960  MRN: [id number]    Oncology Navigation Follow-Up Note    Contact Type:  Telephone    Notes:   Phone call received from patient. She states she has not received compazine. Chart reviewed. Informed her the prescription was received by Nanda Aguirre on 4/10/25. She states her [facility] does not have it listed. Instructed her to call the pharmacy to check if ready.   She states her stomach hurts so bad she lays in bed most of the day. She reports taking omeprazole as ordered. Instructed her to contact her PCP or go to Urgent Care if compazine does not help. She verbalized understanding.      Electronically signed by Yelena Galvan RN on 2025 at 8:37 AM

## 2025-04-22 ENCOUNTER — HOSPITAL ENCOUNTER (OUTPATIENT)
Dept: INFUSION THERAPY | Age: 65
Discharge: HOME | End: 2025-04-22
Payer: MEDICAID

## 2025-04-22 ENCOUNTER — PATIENT MESSAGE (OUTPATIENT)
Dept: FAMILY MEDICINE CLINIC | Age: 65
End: 2025-04-22

## 2025-04-22 ENCOUNTER — TELEPHONE (OUTPATIENT)
Dept: ONCOLOGY | Age: 65
End: 2025-04-22

## 2025-04-22 ENCOUNTER — OFFICE VISIT (OUTPATIENT)
Dept: ONCOLOGY | Age: 65
End: 2025-04-22
Payer: MEDICAID

## 2025-04-22 VITALS
HEART RATE: 81 BPM | BODY MASS INDEX: 36.68 KG/M2 | OXYGEN SATURATION: 95 % | RESPIRATION RATE: 18 BRPM | DIASTOLIC BLOOD PRESSURE: 55 MMHG | HEIGHT: 63 IN | WEIGHT: 207 LBS | TEMPERATURE: 97.1 F | SYSTOLIC BLOOD PRESSURE: 101 MMHG

## 2025-04-22 VITALS
OXYGEN SATURATION: 95 % | RESPIRATION RATE: 16 BRPM | HEIGHT: 62 IN | DIASTOLIC BLOOD PRESSURE: 55 MMHG | SYSTOLIC BLOOD PRESSURE: 101 MMHG | WEIGHT: 207 LBS | HEART RATE: 81 BPM | BODY MASS INDEX: 38.09 KG/M2 | TEMPERATURE: 97 F

## 2025-04-22 DIAGNOSIS — M79.604 BILATERAL LEG PAIN: ICD-10-CM

## 2025-04-22 DIAGNOSIS — Z51.11 CHEMOTHERAPY MANAGEMENT, ENCOUNTER FOR: ICD-10-CM

## 2025-04-22 DIAGNOSIS — M54.50 BILATERAL LOW BACK PAIN, UNSPECIFIED CHRONICITY, UNSPECIFIED WHETHER SCIATICA PRESENT: ICD-10-CM

## 2025-04-22 DIAGNOSIS — R49.0 HOARSENESS: ICD-10-CM

## 2025-04-22 DIAGNOSIS — C83.398 DIFFUSE LARGE B-CELL LYMPHOMA OF EXTRANODAL SITE EXCLUDING SPLEEN AND OTHER SOLID ORGANS (HCC): Primary | ICD-10-CM

## 2025-04-22 DIAGNOSIS — C83.398 DIFFUSE LARGE B-CELL LYMPHOMA OF SOLID ORGAN EXCLUDING SPLEEN (HCC): ICD-10-CM

## 2025-04-22 DIAGNOSIS — C83.398 DIFFUSE LARGE B-CELL LYMPHOMA OF SOLID ORGAN EXCLUDING SPLEEN (HCC): Primary | ICD-10-CM

## 2025-04-22 DIAGNOSIS — M89.9 BONE LESION: ICD-10-CM

## 2025-04-22 DIAGNOSIS — M79.605 BILATERAL LEG PAIN: ICD-10-CM

## 2025-04-22 LAB
ALBUMIN SERPL-MCNC: 3.8 G/DL (ref 3.5–5.2)
ALBUMIN/GLOB SERPL: 1.4 {RATIO} (ref 1–2.5)
ALP SERPL-CCNC: 91 U/L (ref 35–104)
ALT SERPL-CCNC: 12 U/L (ref 10–35)
ANION GAP SERPL CALCULATED.3IONS-SCNC: 13 MMOL/L (ref 9–16)
AST SERPL-CCNC: 15 U/L (ref 10–35)
BASOPHILS # BLD: 0.04 K/UL (ref 0–0.2)
BASOPHILS NFR BLD: 1 % (ref 0–2)
BILIRUB SERPL-MCNC: 0.3 MG/DL (ref 0–1.2)
BUN SERPL-MCNC: 9 MG/DL (ref 8–23)
BUN/CREAT SERPL: 18 (ref 9–20)
CALCIUM SERPL-MCNC: 9.4 MG/DL (ref 8.6–10.4)
CHLORIDE SERPL-SCNC: 102 MMOL/L (ref 98–107)
CO2 SERPL-SCNC: 25 MMOL/L (ref 20–31)
CREAT SERPL-MCNC: 0.5 MG/DL (ref 0.6–0.9)
EOSINOPHIL # BLD: 0.19 K/UL (ref 0–0.44)
EOSINOPHILS RELATIVE PERCENT: 3 % (ref 1–4)
ERYTHROCYTE [DISTWIDTH] IN BLOOD BY AUTOMATED COUNT: 16.7 % (ref 11.8–14.4)
GFR, ESTIMATED: >90 ML/MIN/1.73M2
GLUCOSE SERPL-MCNC: 127 MG/DL (ref 74–99)
HCT VFR BLD AUTO: 31.9 % (ref 36.3–47.1)
HGB BLD-MCNC: 10.2 G/DL (ref 11.9–15.1)
IMM GRANULOCYTES # BLD AUTO: 0.09 K/UL (ref 0–0.3)
IMM GRANULOCYTES NFR BLD: 1 %
LYMPHOCYTES NFR BLD: 0.94 K/UL (ref 1.1–3.7)
LYMPHOCYTES RELATIVE PERCENT: 13 % (ref 24–43)
MCH RBC QN AUTO: 30.2 PG (ref 25.2–33.5)
MCHC RBC AUTO-ENTMCNC: 32 G/DL (ref 25.2–33.5)
MCV RBC AUTO: 94.4 FL (ref 82.6–102.9)
MONOCYTES NFR BLD: 0.88 K/UL (ref 0.1–1.2)
MONOCYTES NFR BLD: 12 % (ref 3–12)
NEUTROPHILS NFR BLD: 70 % (ref 36–65)
NEUTS SEG NFR BLD: 5 K/UL (ref 1.5–8.1)
NRBC BLD-RTO: 0.4 PER 100 WBC
PLATELET # BLD AUTO: 325 K/UL (ref 138–453)
PMV BLD AUTO: 9 FL (ref 8.1–13.5)
POTASSIUM SERPL-SCNC: 4 MMOL/L (ref 3.7–5.3)
PROT SERPL-MCNC: 6.5 G/DL (ref 6.6–8.7)
RBC # BLD AUTO: 3.38 M/UL (ref 3.95–5.11)
RBC # BLD: ABNORMAL 10*6/UL
SODIUM SERPL-SCNC: 140 MMOL/L (ref 136–145)
WBC OTHER # BLD: 7.1 K/UL (ref 3.5–11.3)

## 2025-04-22 PROCEDURE — 6360000002 HC RX W HCPCS: Performed by: INTERNAL MEDICINE

## 2025-04-22 PROCEDURE — 2580000003 HC RX 258: Performed by: INTERNAL MEDICINE

## 2025-04-22 PROCEDURE — 96375 TX/PRO/DX INJ NEW DRUG ADDON: CPT

## 2025-04-22 PROCEDURE — 6370000000 HC RX 637 (ALT 250 FOR IP): Performed by: INTERNAL MEDICINE

## 2025-04-22 PROCEDURE — 96415 CHEMO IV INFUSION ADDL HR: CPT

## 2025-04-22 PROCEDURE — 96411 CHEMO IV PUSH ADDL DRUG: CPT

## 2025-04-22 PROCEDURE — 85025 COMPLETE CBC W/AUTO DIFF WBC: CPT

## 2025-04-22 PROCEDURE — 80053 COMPREHEN METABOLIC PANEL: CPT

## 2025-04-22 PROCEDURE — 99214 OFFICE O/P EST MOD 30 MIN: CPT | Performed by: INTERNAL MEDICINE

## 2025-04-22 PROCEDURE — 99215 OFFICE O/P EST HI 40 MIN: CPT | Performed by: INTERNAL MEDICINE

## 2025-04-22 PROCEDURE — 3074F SYST BP LT 130 MM HG: CPT | Performed by: INTERNAL MEDICINE

## 2025-04-22 PROCEDURE — 96367 TX/PROPH/DG ADDL SEQ IV INF: CPT

## 2025-04-22 PROCEDURE — 96417 CHEMO IV INFUS EACH ADDL SEQ: CPT

## 2025-04-22 PROCEDURE — 96413 CHEMO IV INFUSION 1 HR: CPT

## 2025-04-22 PROCEDURE — 3078F DIAST BP <80 MM HG: CPT | Performed by: INTERNAL MEDICINE

## 2025-04-22 RX ORDER — EPINEPHRINE 1 MG/ML
0.3 INJECTION, SOLUTION, CONCENTRATE INTRAVENOUS PRN
OUTPATIENT
Start: 2025-05-14

## 2025-04-22 RX ORDER — DOXORUBICIN HYDROCHLORIDE 2 MG/ML
50 INJECTION, SOLUTION INTRAVENOUS ONCE
Status: COMPLETED | OUTPATIENT
Start: 2025-04-22 | End: 2025-04-22

## 2025-04-22 RX ORDER — PALONOSETRON 0.05 MG/ML
0.25 INJECTION, SOLUTION INTRAVENOUS ONCE
OUTPATIENT
Start: 2025-05-14 | End: 2025-05-14

## 2025-04-22 RX ORDER — GUAIFENESIN 600 MG/1
600 TABLET, EXTENDED RELEASE ORAL 2 TIMES DAILY
Qty: 30 TABLET | Refills: 0 | Status: SHIPPED | OUTPATIENT
Start: 2025-04-22 | End: 2025-05-07

## 2025-04-22 RX ORDER — SODIUM CHLORIDE 9 MG/ML
5-250 INJECTION, SOLUTION INTRAVENOUS PRN
OUTPATIENT
Start: 2025-05-14

## 2025-04-22 RX ORDER — HEPARIN 100 UNIT/ML
500 SYRINGE INTRAVENOUS PRN
Status: DISCONTINUED | OUTPATIENT
Start: 2025-04-22 | End: 2025-04-23 | Stop reason: HOSPADM

## 2025-04-22 RX ORDER — ALBUTEROL SULFATE 90 UG/1
4 INHALANT RESPIRATORY (INHALATION) PRN
OUTPATIENT
Start: 2025-05-14

## 2025-04-22 RX ORDER — DOXORUBICIN HYDROCHLORIDE 2 MG/ML
50 INJECTION, SOLUTION INTRAVENOUS ONCE
OUTPATIENT
Start: 2025-05-14 | End: 2025-05-14

## 2025-04-22 RX ORDER — SODIUM CHLORIDE 0.9 % (FLUSH) 0.9 %
5-40 SYRINGE (ML) INJECTION PRN
OUTPATIENT
Start: 2025-05-14

## 2025-04-22 RX ORDER — ACETAMINOPHEN 325 MG/1
650 TABLET ORAL
OUTPATIENT
Start: 2025-05-14

## 2025-04-22 RX ORDER — DIPHENHYDRAMINE HYDROCHLORIDE 50 MG/ML
50 INJECTION, SOLUTION INTRAMUSCULAR; INTRAVENOUS
OUTPATIENT
Start: 2025-05-14

## 2025-04-22 RX ORDER — HYDROCORTISONE SODIUM SUCCINATE 100 MG/2ML
100 INJECTION INTRAMUSCULAR; INTRAVENOUS
OUTPATIENT
Start: 2025-05-14

## 2025-04-22 RX ORDER — SODIUM CHLORIDE 9 MG/ML
INJECTION, SOLUTION INTRAVENOUS CONTINUOUS
OUTPATIENT
Start: 2025-05-14

## 2025-04-22 RX ORDER — DIPHENHYDRAMINE HYDROCHLORIDE 50 MG/ML
50 INJECTION, SOLUTION INTRAMUSCULAR; INTRAVENOUS ONCE
Status: COMPLETED | OUTPATIENT
Start: 2025-04-22 | End: 2025-04-22

## 2025-04-22 RX ORDER — ACETAMINOPHEN 325 MG/1
650 TABLET ORAL ONCE
OUTPATIENT
Start: 2025-05-14 | End: 2025-05-14

## 2025-04-22 RX ORDER — ACETAMINOPHEN 325 MG/1
650 TABLET ORAL ONCE
Status: COMPLETED | OUTPATIENT
Start: 2025-04-22 | End: 2025-04-22

## 2025-04-22 RX ORDER — PROCHLORPERAZINE EDISYLATE 5 MG/ML
5 INJECTION INTRAMUSCULAR; INTRAVENOUS
OUTPATIENT
Start: 2025-05-14

## 2025-04-22 RX ORDER — PALONOSETRON 0.05 MG/ML
0.25 INJECTION, SOLUTION INTRAVENOUS ONCE
Status: COMPLETED | OUTPATIENT
Start: 2025-04-22 | End: 2025-04-22

## 2025-04-22 RX ORDER — HEPARIN SODIUM (PORCINE) LOCK FLUSH IV SOLN 100 UNIT/ML 100 UNIT/ML
500 SOLUTION INTRAVENOUS PRN
OUTPATIENT
Start: 2025-05-14

## 2025-04-22 RX ORDER — GABAPENTIN 100 MG/1
200 CAPSULE ORAL 2 TIMES DAILY
Qty: 60 CAPSULE | Refills: 1 | Status: SHIPPED | OUTPATIENT
Start: 2025-04-22 | End: 2025-05-22

## 2025-04-22 RX ORDER — FAMOTIDINE 10 MG/ML
20 INJECTION, SOLUTION INTRAVENOUS
OUTPATIENT
Start: 2025-05-14

## 2025-04-22 RX ORDER — SODIUM CHLORIDE 9 MG/ML
5-250 INJECTION, SOLUTION INTRAVENOUS PRN
Status: DISCONTINUED | OUTPATIENT
Start: 2025-04-22 | End: 2025-04-23 | Stop reason: HOSPADM

## 2025-04-22 RX ORDER — DIPHENHYDRAMINE HYDROCHLORIDE 50 MG/ML
50 INJECTION, SOLUTION INTRAMUSCULAR; INTRAVENOUS ONCE
OUTPATIENT
Start: 2025-05-14 | End: 2025-05-14

## 2025-04-22 RX ORDER — SODIUM CHLORIDE 0.9 % (FLUSH) 0.9 %
5-40 SYRINGE (ML) INJECTION PRN
Status: DISCONTINUED | OUTPATIENT
Start: 2025-04-22 | End: 2025-04-23 | Stop reason: HOSPADM

## 2025-04-22 RX ORDER — ONDANSETRON 2 MG/ML
8 INJECTION INTRAMUSCULAR; INTRAVENOUS
OUTPATIENT
Start: 2025-05-14

## 2025-04-22 RX ORDER — MEPERIDINE HYDROCHLORIDE 50 MG/ML
12.5 INJECTION INTRAMUSCULAR; INTRAVENOUS; SUBCUTANEOUS PRN
OUTPATIENT
Start: 2025-05-14

## 2025-04-22 RX ADMIN — SODIUM CHLORIDE 150 MG: 0.9 INJECTION, SOLUTION INTRAVENOUS at 10:19

## 2025-04-22 RX ADMIN — ACETAMINOPHEN 650 MG: 325 TABLET ORAL at 10:04

## 2025-04-22 RX ADMIN — DOXORUBICIN HYDROCHLORIDE 104 MG: 2 INJECTION, SOLUTION INTRAVENOUS at 13:40

## 2025-04-22 RX ADMIN — HEPARIN 500 UNITS: 100 SYRINGE at 14:17

## 2025-04-22 RX ADMIN — DIPHENHYDRAMINE HYDROCHLORIDE 50 MG: 50 INJECTION INTRAMUSCULAR; INTRAVENOUS at 10:05

## 2025-04-22 RX ADMIN — SODIUM CHLORIDE 20 ML/HR: 0.9 INJECTION, SOLUTION INTRAVENOUS at 10:00

## 2025-04-22 RX ADMIN — CYCLOPHOSPHAMIDE 1560 MG: 2 INJECTION, POWDER, FOR SOLUTION INTRAVENOUS; ORAL at 12:55

## 2025-04-22 RX ADMIN — VINCRISTINE SULFATE 2 MG: 1 INJECTION, SOLUTION INTRAVENOUS at 13:57

## 2025-04-22 RX ADMIN — SODIUM CHLORIDE 800 MG: 0.9 INJECTION, SOLUTION INTRAVENOUS at 11:07

## 2025-04-22 RX ADMIN — PALONOSETRON 0.25 MG: 0.05 INJECTION, SOLUTION INTRAVENOUS at 10:05

## 2025-04-22 NOTE — TELEPHONE ENCOUNTER
Name: Therese Mckeon  : 1960  MRN: [id number]    Oncology Navigation Follow-Up Note    Contact Type:   infusion room    Notes:   Writer met with patient while in infusion room for treatment today.   She states her stomach still hurts but is helped somewhat by the compazine. She feels best after her IV premeds on infusion days.   Encouraged her to call if she has any questions/concerns. Understanding verbalized.   Navigator following for continuity of care.        Electronically signed by Yelena Galvan RN on 2025 at 3:52 PM

## 2025-04-22 NOTE — TELEPHONE ENCOUNTER
Per [facility] message:  Cheko Null. Tried to call you back but couldn’t get through. Only been taking this a month. Is this enough time to see if it’s working? Haven’t noticed a difference as of yet. If it’s not long enough then yes I need a refill     Therese called requesting a refill of the below medication which has been pended for you:     Requested Prescriptions     Pending Prescriptions Disp Refills    omeprazole (PRILOSEC) 20 MG delayed release capsule [Pharmacy Med Name: OMEPRAZOLE 20MG CAP] 30 capsule 0     Sig: TAKE 1 CAPSULE BY MOUTH ONCE DAILY IN THE MORNING BEFORE BREAKFAST       Last Appointment Date: 3/31/2025  Next Appointment Date: 10/6/2025    Allergies   Allergen Reactions    Amoxicillin Er Itching     Makes blood pressure drop

## 2025-04-22 NOTE — PROGRESS NOTES
Therese Mckeon                                                                                                                  4/22/2025  MRN:   [id number]  YOB: 1960  PCP:                           Mary Schneider DO  Referring Physician: No ref. provider found  Treating Physician Name: MITUL RICE MD      Reason for visit:  Chief Complaint   Patient presents with    Lymphoma     3 week follow up   Toxicity check  Cycle #3    Current problems:  Diffuse large B-cell lymphoma, germinal center type, NOS  -Molecular testing could not be performed due to decalcified specimen  Tobacco dependence  Back pain  Medical comorbidities: Dyslipidemia hypertension hypothyroidism    Active and recent treatments:  R-CHOP    Interval history:  Patient presents to the clinic for a follow-up visit.  She is scheduled for cycle 3.  Present for toxicity check.  Complains abdominal discomfort.  Complains of persistent cough.  Hoarseness of voice has improved.  Complains of weight loss.  Complains of poor appetite.  Continues to struggle with back pain on and off.    During this visit patient's allergy, social, medical, surgical history and medications were reviewed and updated.    Summary of Case/History:  Therese Mckeon a 65 y.o.female is a patient with lytic lesions of the spine concerning for metastatic disease presents to the clinic to establish care and for further workup and evaluation.  Patient was initially referred to neurosurgery because of significant back pain.  Patient subsequently had MRI of the lumbar spine which showed lytic process at L4 with soft tissue component extending into the spinal canal.  Patient has been started on Decadron.  Her back pain has improved.  Patient yesterday had a CT chest and pelvis.  No obvious finding suggestive of a primary tumor noted on the CT chest and pelvis.  MRI cervical and thoracic spine show addition bony lesion concerning for metastatic disease.

## 2025-04-22 NOTE — PROGRESS NOTES
VAD accessed per protocol with 3/4 inch 20 gauge terrell needle. Flushed easily with 10ml of NS. Good blood return. Labs were drawn. Secured accessed port with transparent dressing. IV infusing NSS. Patient received pre meds, Ruxience over 90 minutes, cytoxan, adriamycin, and vincristine infusions.VAD de-accessed after flushing with 10ml NS and 5ml of heparin. Band aid applied to site. No sign of reaction to infusion at this time. Pt ambulated out of infusion center door without difficulty.

## 2025-04-23 ENCOUNTER — HOSPITAL ENCOUNTER (OUTPATIENT)
Dept: INFUSION THERAPY | Age: 65
Discharge: HOME | End: 2025-04-23
Payer: MEDICAID

## 2025-04-23 VITALS
TEMPERATURE: 97.4 F | OXYGEN SATURATION: 93 % | RESPIRATION RATE: 16 BRPM | SYSTOLIC BLOOD PRESSURE: 156 MMHG | DIASTOLIC BLOOD PRESSURE: 77 MMHG | HEART RATE: 93 BPM

## 2025-04-23 DIAGNOSIS — R49.0 HOARSENESS: ICD-10-CM

## 2025-04-23 DIAGNOSIS — C83.398 DIFFUSE LARGE B-CELL LYMPHOMA OF SOLID ORGAN EXCLUDING SPLEEN (HCC): Primary | ICD-10-CM

## 2025-04-23 PROCEDURE — 96372 THER/PROPH/DIAG INJ SC/IM: CPT

## 2025-04-23 PROCEDURE — 6360000002 HC RX W HCPCS: Performed by: INTERNAL MEDICINE

## 2025-04-23 RX ORDER — OMEPRAZOLE 20 MG/1
20 CAPSULE, DELAYED RELEASE ORAL
Qty: 30 CAPSULE | Refills: 0 | OUTPATIENT
Start: 2025-04-23

## 2025-04-23 RX ADMIN — PEGFILGRASTIM-JMDB 6 MG: 6 INJECTION SUBCUTANEOUS at 14:59

## 2025-04-23 NOTE — PROGRESS NOTES
Patient arrived for Fulphila injection.  VSS as charted. Patient tolerated well.  Patient left infusion center with all belongings and steady gate.

## 2025-04-26 RX ORDER — OMEPRAZOLE 40 MG/1
40 CAPSULE, DELAYED RELEASE ORAL
Qty: 30 CAPSULE | Refills: 0 | Status: SHIPPED | OUTPATIENT
Start: 2025-04-26

## 2025-04-26 NOTE — TELEPHONE ENCOUNTER
Will increase dose to 40 mg once daily to see if this dose works better for her; 30-day supply sent in now for her to try for 1 more month.  Please inform pt.

## 2025-04-28 ENCOUNTER — OFFICE VISIT (OUTPATIENT)
Dept: PRIMARY CARE CLINIC | Age: 65
End: 2025-04-28
Payer: MEDICAID

## 2025-04-28 ENCOUNTER — RESULTS FOLLOW-UP (OUTPATIENT)
Dept: PRIMARY CARE CLINIC | Age: 65
End: 2025-04-28

## 2025-04-28 VITALS
OXYGEN SATURATION: 92 % | HEART RATE: 113 BPM | TEMPERATURE: 97.6 F | SYSTOLIC BLOOD PRESSURE: 122 MMHG | BODY MASS INDEX: 38.81 KG/M2 | DIASTOLIC BLOOD PRESSURE: 80 MMHG | WEIGHT: 212.2 LBS

## 2025-04-28 DIAGNOSIS — K12.30 MUCOSITIS: Primary | ICD-10-CM

## 2025-04-28 DIAGNOSIS — J02.9 SORE THROAT: ICD-10-CM

## 2025-04-28 LAB — S PYO AG THROAT QL: NORMAL

## 2025-04-28 PROCEDURE — 99213 OFFICE O/P EST LOW 20 MIN: CPT | Performed by: PHYSICIAN ASSISTANT

## 2025-04-28 PROCEDURE — 3074F SYST BP LT 130 MM HG: CPT | Performed by: PHYSICIAN ASSISTANT

## 2025-04-28 PROCEDURE — 3079F DIAST BP 80-89 MM HG: CPT | Performed by: PHYSICIAN ASSISTANT

## 2025-04-28 PROCEDURE — 99212 OFFICE O/P EST SF 10 MIN: CPT | Performed by: PHYSICIAN ASSISTANT

## 2025-04-28 PROCEDURE — PBSHW POCT RAPID STREP A: Performed by: PHYSICIAN ASSISTANT

## 2025-04-28 PROCEDURE — 87880 STREP A ASSAY W/OPTIC: CPT | Performed by: PHYSICIAN ASSISTANT

## 2025-04-28 RX ORDER — PREDNISONE 20 MG/1
20 TABLET ORAL 2 TIMES DAILY
Qty: 10 TABLET | Refills: 0 | Status: CANCELLED | OUTPATIENT
Start: 2025-04-28 | End: 2025-05-03

## 2025-04-28 RX ORDER — ALBUTEROL SULFATE 90 UG/1
2 INHALANT RESPIRATORY (INHALATION) EVERY 4 HOURS PRN
Qty: 18 G | Refills: 0 | Status: SHIPPED | OUTPATIENT
Start: 2025-04-28

## 2025-04-28 RX ORDER — NYSTATIN 100000 [USP'U]/ML
500000 SUSPENSION ORAL 4 TIMES DAILY
Qty: 200 ML | Refills: 0 | Status: SHIPPED | OUTPATIENT
Start: 2025-04-28 | End: 2025-05-08

## 2025-04-28 ASSESSMENT — ENCOUNTER SYMPTOMS
SORE THROAT: 1
WHEEZING: 1
SINUS PRESSURE: 0
SINUS PAIN: 0
EYE PAIN: 0
DIARRHEA: 1
VOMITING: 0
NAUSEA: 0
TROUBLE SWALLOWING: 1
SHORTNESS OF BREATH: 0
ABDOMINAL PAIN: 0
RHINORRHEA: 0
COUGH: 1

## 2025-04-28 NOTE — PATIENT INSTRUCTIONS
Use nystatin mouth rinse as prescribed.  Use albuterol if needed for severe wheezing or shortness of breath  Increase fluid intake  Eat soft food diet  Follow up with PCP or walk in if symptoms persist or worsen

## 2025-04-28 NOTE — PROGRESS NOTES
[facility]  [address]  [facility] [address]  Dept: [phone]  Dept Fax: [phone]    Therese Mckeon is a 65 y.o. female who presents today for her medical conditions/complaints as noted below. Therese Mckeon is c/o of   Chief Complaint   Patient presents with    Pharyngitis     Sore throat and diarrhea    .    HPI:     Patient is a 66 yo female who presents today due to sore throat, cough productive of mild sputum, and diarrhea. Her symptoms began yesterday. She had a chemotherapy treatment last week and has not been around any sick contacts. She has a history of developing bronchitis.     Pharyngitis  This is a new problem. The current episode started yesterday. The problem has been gradually worsening. Associated symptoms include coughing and a sore throat. Pertinent negatives include no abdominal pain, chest pain, chills, congestion, fatigue, fever, headaches, myalgias, nausea, rash or vomiting.         Past Medical History:   Diagnosis Date    Hyperlipidemia     Hypertension     Hypothyroidism     Large B-cell lymphoma (HCC)     Obesity     Reticulosarcoma (HCC)     Smoker     Spinal cord lesion (HCC)      Past Surgical History:   Procedure Laterality Date    BONE BIOPSY N/A 01/17/2025    BONE MARROW BIOPSY N/A 02/20/2025    CT BIOPSY BONE MARROW    COLONOSCOPY      CT BIOPSY BONE MARROW  2/20/2025    CT BIOPSY BONE MARROW 2/20/2025 [facility] CT SCAN    CT BIOPSY DEEP BONE PERCUTANEOUS  01/17/2025    CT BIOPSY DEEP BONE PERCUTANEOUS 1/17/2025 [facility] CT SCAN    CYSTOCELE REPAIR  09/25/2020    Midurethral sling via the retropubic route of the [employer] Fit polypropylene side. REF is [id number], LOT number is [id number]; urethrolysis and excision of suburethral band    DILATION AND CURETTAGE OF UTERUS  1995    following miscarriage    PARTIAL HYSTERECTOMY

## 2025-05-07 ENCOUNTER — HOSPITAL ENCOUNTER (OUTPATIENT)
Dept: NUCLEAR MEDICINE | Age: 65
Discharge: HOME | End: 2025-05-09
Attending: INTERNAL MEDICINE
Payer: MEDICAID

## 2025-05-07 DIAGNOSIS — Z51.11 CHEMOTHERAPY MANAGEMENT, ENCOUNTER FOR: ICD-10-CM

## 2025-05-07 DIAGNOSIS — C83.398 DIFFUSE LARGE B-CELL LYMPHOMA OF SOLID ORGAN EXCLUDING SPLEEN (HCC): ICD-10-CM

## 2025-05-07 DIAGNOSIS — M89.9 BONE LESION: ICD-10-CM

## 2025-05-07 LAB — GLUCOSE BLD-MCNC: 104 MG/DL (ref 65–105)

## 2025-05-07 PROCEDURE — 3430000000 HC RX DIAGNOSTIC RADIOPHARMACEUTICAL: Performed by: INTERNAL MEDICINE

## 2025-05-07 PROCEDURE — A9609 HC RX DIAGNOSTIC RADIOPHARMACEUTICAL: HCPCS | Performed by: INTERNAL MEDICINE

## 2025-05-07 PROCEDURE — 78815 PET IMAGE W/CT SKULL-THIGH: CPT

## 2025-05-07 PROCEDURE — 2500000003 HC RX 250 WO HCPCS: Performed by: INTERNAL MEDICINE

## 2025-05-07 PROCEDURE — 82947 ASSAY GLUCOSE BLOOD QUANT: CPT

## 2025-05-07 RX ORDER — SODIUM CHLORIDE 0.9 % (FLUSH) 0.9 %
10 SYRINGE (ML) INJECTION ONCE
Status: COMPLETED | OUTPATIENT
Start: 2025-05-07 | End: 2025-05-07

## 2025-05-07 RX ORDER — FLUDEOXYGLUCOSE F 18 200 MCI/ML
10 INJECTION, SOLUTION INTRAVENOUS
Status: COMPLETED | OUTPATIENT
Start: 2025-05-07 | End: 2025-05-07

## 2025-05-07 RX ADMIN — SODIUM CHLORIDE, PRESERVATIVE FREE 10 ML: 5 INJECTION INTRAVENOUS at 07:56

## 2025-05-07 RX ADMIN — FLUDEOXYGLUCOSE F 18 12.13 MILLICURIE: 200 INJECTION, SOLUTION INTRAVENOUS at 07:56

## 2025-05-09 ENCOUNTER — HOSPITAL ENCOUNTER (OUTPATIENT)
Dept: GENERAL RADIOLOGY | Age: 65
Discharge: HOME | End: 2025-05-11
Payer: MEDICAID

## 2025-05-09 PROCEDURE — 72110 X-RAY EXAM L-2 SPINE 4/>VWS: CPT

## 2025-05-13 ENCOUNTER — OFFICE VISIT (OUTPATIENT)
Dept: ONCOLOGY | Age: 65
End: 2025-05-13
Payer: MEDICAID

## 2025-05-13 ENCOUNTER — HOSPITAL ENCOUNTER (OUTPATIENT)
Dept: INFUSION THERAPY | Age: 65
Discharge: HOME | End: 2025-05-13
Payer: MEDICAID

## 2025-05-13 ENCOUNTER — TELEPHONE (OUTPATIENT)
Dept: ONCOLOGY | Age: 65
End: 2025-05-13

## 2025-05-13 VITALS
SYSTOLIC BLOOD PRESSURE: 127 MMHG | HEIGHT: 62 IN | WEIGHT: 203.8 LBS | OXYGEN SATURATION: 96 % | BODY MASS INDEX: 37.5 KG/M2 | TEMPERATURE: 97.1 F | DIASTOLIC BLOOD PRESSURE: 75 MMHG | RESPIRATION RATE: 16 BRPM | HEART RATE: 116 BPM

## 2025-05-13 VITALS
DIASTOLIC BLOOD PRESSURE: 60 MMHG | BODY MASS INDEX: 34.72 KG/M2 | TEMPERATURE: 98.4 F | SYSTOLIC BLOOD PRESSURE: 116 MMHG | HEIGHT: 64 IN | HEART RATE: 60 BPM | WEIGHT: 203.38 LBS

## 2025-05-13 DIAGNOSIS — M79.604 BILATERAL LEG PAIN: ICD-10-CM

## 2025-05-13 DIAGNOSIS — M79.605 BILATERAL LEG PAIN: ICD-10-CM

## 2025-05-13 DIAGNOSIS — C83.398 DIFFUSE LARGE B-CELL LYMPHOMA OF SOLID ORGAN EXCLUDING SPLEEN (HCC): Primary | ICD-10-CM

## 2025-05-13 DIAGNOSIS — M54.50 BILATERAL LOW BACK PAIN, UNSPECIFIED CHRONICITY, UNSPECIFIED WHETHER SCIATICA PRESENT: ICD-10-CM

## 2025-05-13 DIAGNOSIS — C83.398 DIFFUSE LARGE B-CELL LYMPHOMA OF EXTRANODAL SITE EXCLUDING SPLEEN AND OTHER SOLID ORGANS (HCC): ICD-10-CM

## 2025-05-13 LAB
ALBUMIN SERPL-MCNC: 4.1 G/DL (ref 3.5–5.2)
ALBUMIN/GLOB SERPL: 1.6 {RATIO} (ref 1–2.5)
ALP SERPL-CCNC: 78 U/L (ref 35–104)
ALT SERPL-CCNC: 14 U/L (ref 5–33)
ANION GAP SERPL CALCULATED.3IONS-SCNC: 13 MMOL/L (ref 9–17)
AST SERPL-CCNC: 15 U/L
BASOPHILS # BLD: 0.07 K/UL (ref 0–0.2)
BASOPHILS NFR BLD: 1 % (ref 0–2)
BILIRUB SERPL-MCNC: 0.2 MG/DL (ref 0.3–1.2)
BUN SERPL-MCNC: 14 MG/DL (ref 8–23)
BUN/CREAT SERPL: 28 (ref 9–20)
CALCIUM SERPL-MCNC: 9.7 MG/DL (ref 8.6–10.4)
CHLORIDE SERPL-SCNC: 105 MMOL/L (ref 98–107)
CO2 SERPL-SCNC: 25 MMOL/L (ref 20–31)
CREAT SERPL-MCNC: 0.5 MG/DL (ref 0.5–0.9)
EOSINOPHIL # BLD: 0.09 K/UL (ref 0–0.44)
EOSINOPHILS RELATIVE PERCENT: 1 % (ref 1–4)
ERYTHROCYTE [DISTWIDTH] IN BLOOD BY AUTOMATED COUNT: 16.3 % (ref 11.8–14.4)
GFR, ESTIMATED: >90 ML/MIN/1.73M2
GLUCOSE SERPL-MCNC: 113 MG/DL (ref 70–99)
HCT VFR BLD AUTO: 32.1 % (ref 36.3–47.1)
HGB BLD-MCNC: 9.8 G/DL (ref 11.9–15.1)
IMM GRANULOCYTES # BLD AUTO: 0.06 K/UL (ref 0–0.3)
IMM GRANULOCYTES NFR BLD: 1 %
LYMPHOCYTES NFR BLD: 1.07 K/UL (ref 1.1–3.7)
LYMPHOCYTES RELATIVE PERCENT: 15 % (ref 24–43)
MCH RBC QN AUTO: 30.1 PG (ref 25.2–33.5)
MCHC RBC AUTO-ENTMCNC: 30.5 G/DL (ref 25.2–33.5)
MCV RBC AUTO: 98.5 FL (ref 82.6–102.9)
MONOCYTES NFR BLD: 1.01 K/UL (ref 0.1–1.2)
MONOCYTES NFR BLD: 14 % (ref 3–12)
NEUTROPHILS NFR BLD: 68 % (ref 36–65)
NEUTS SEG NFR BLD: 4.79 K/UL (ref 1.5–8.1)
NRBC BLD-RTO: 0.3 PER 100 WBC
PLATELET # BLD AUTO: 319 K/UL (ref 138–453)
PMV BLD AUTO: 8.9 FL (ref 8.1–13.5)
POTASSIUM SERPL-SCNC: 4.2 MMOL/L (ref 3.7–5.3)
PROT SERPL-MCNC: 6.7 G/DL (ref 6.4–8.3)
RBC # BLD AUTO: 3.26 M/UL (ref 3.95–5.11)
RBC # BLD: ABNORMAL 10*6/UL
SODIUM SERPL-SCNC: 143 MMOL/L (ref 135–144)
WBC OTHER # BLD: 7.1 K/UL (ref 3.5–11.3)

## 2025-05-13 PROCEDURE — 6360000002 HC RX W HCPCS: Performed by: INTERNAL MEDICINE

## 2025-05-13 PROCEDURE — 96375 TX/PRO/DX INJ NEW DRUG ADDON: CPT

## 2025-05-13 PROCEDURE — 85025 COMPLETE CBC W/AUTO DIFF WBC: CPT

## 2025-05-13 PROCEDURE — 96413 CHEMO IV INFUSION 1 HR: CPT

## 2025-05-13 PROCEDURE — 96374 THER/PROPH/DIAG INJ IV PUSH: CPT

## 2025-05-13 PROCEDURE — 2500000003 HC RX 250 WO HCPCS: Performed by: INTERNAL MEDICINE

## 2025-05-13 PROCEDURE — 80053 COMPREHEN METABOLIC PANEL: CPT

## 2025-05-13 PROCEDURE — 3074F SYST BP LT 130 MM HG: CPT | Performed by: INTERNAL MEDICINE

## 2025-05-13 PROCEDURE — 3078F DIAST BP <80 MM HG: CPT | Performed by: INTERNAL MEDICINE

## 2025-05-13 PROCEDURE — 96409 CHEMO IV PUSH SNGL DRUG: CPT

## 2025-05-13 PROCEDURE — 99215 OFFICE O/P EST HI 40 MIN: CPT | Performed by: INTERNAL MEDICINE

## 2025-05-13 PROCEDURE — 96417 CHEMO IV INFUS EACH ADDL SEQ: CPT

## 2025-05-13 PROCEDURE — 99213 OFFICE O/P EST LOW 20 MIN: CPT | Performed by: INTERNAL MEDICINE

## 2025-05-13 PROCEDURE — 2580000003 HC RX 258: Performed by: INTERNAL MEDICINE

## 2025-05-13 PROCEDURE — 6370000000 HC RX 637 (ALT 250 FOR IP): Performed by: INTERNAL MEDICINE

## 2025-05-13 RX ORDER — SODIUM CHLORIDE 9 MG/ML
5-250 INJECTION, SOLUTION INTRAVENOUS PRN
OUTPATIENT
Start: 2025-06-04

## 2025-05-13 RX ORDER — ALBUTEROL SULFATE 90 UG/1
4 INHALANT RESPIRATORY (INHALATION) PRN
OUTPATIENT
Start: 2025-06-04

## 2025-05-13 RX ORDER — HYDROCORTISONE SODIUM SUCCINATE 100 MG/2ML
100 INJECTION INTRAMUSCULAR; INTRAVENOUS
OUTPATIENT
Start: 2025-06-04

## 2025-05-13 RX ORDER — DIPHENHYDRAMINE HYDROCHLORIDE 50 MG/ML
50 INJECTION, SOLUTION INTRAMUSCULAR; INTRAVENOUS
OUTPATIENT
Start: 2025-06-04

## 2025-05-13 RX ORDER — SODIUM CHLORIDE 9 MG/ML
5-250 INJECTION, SOLUTION INTRAVENOUS PRN
Status: DISCONTINUED | OUTPATIENT
Start: 2025-05-13 | End: 2025-05-14 | Stop reason: HOSPADM

## 2025-05-13 RX ORDER — DOXORUBICIN HYDROCHLORIDE 2 MG/ML
50 INJECTION, SOLUTION INTRAVENOUS ONCE
Status: CANCELLED | OUTPATIENT
Start: 2025-06-04 | End: 2025-06-04

## 2025-05-13 RX ORDER — ACETAMINOPHEN 325 MG/1
650 TABLET ORAL ONCE
Status: COMPLETED | OUTPATIENT
Start: 2025-05-13 | End: 2025-05-13

## 2025-05-13 RX ORDER — SODIUM CHLORIDE 9 MG/ML
INJECTION, SOLUTION INTRAVENOUS CONTINUOUS
OUTPATIENT
Start: 2025-06-04

## 2025-05-13 RX ORDER — HEPARIN 100 UNIT/ML
500 SYRINGE INTRAVENOUS PRN
Status: DISCONTINUED | OUTPATIENT
Start: 2025-05-13 | End: 2025-05-14 | Stop reason: HOSPADM

## 2025-05-13 RX ORDER — HEPARIN SODIUM (PORCINE) LOCK FLUSH IV SOLN 100 UNIT/ML 100 UNIT/ML
500 SOLUTION INTRAVENOUS PRN
OUTPATIENT
Start: 2025-06-04

## 2025-05-13 RX ORDER — EPINEPHRINE 1 MG/ML
0.3 INJECTION, SOLUTION, CONCENTRATE INTRAVENOUS PRN
OUTPATIENT
Start: 2025-06-04

## 2025-05-13 RX ORDER — GABAPENTIN 100 MG/1
CAPSULE ORAL
Qty: 90 CAPSULE | Refills: 2 | Status: SHIPPED | OUTPATIENT
Start: 2025-05-13 | End: 2025-06-13

## 2025-05-13 RX ORDER — DIPHENHYDRAMINE HYDROCHLORIDE 50 MG/ML
50 INJECTION, SOLUTION INTRAMUSCULAR; INTRAVENOUS ONCE
OUTPATIENT
Start: 2025-06-04 | End: 2025-06-04

## 2025-05-13 RX ORDER — ACETAMINOPHEN 325 MG/1
650 TABLET ORAL ONCE
OUTPATIENT
Start: 2025-06-04 | End: 2025-06-04

## 2025-05-13 RX ORDER — ONDANSETRON 2 MG/ML
8 INJECTION INTRAMUSCULAR; INTRAVENOUS
OUTPATIENT
Start: 2025-06-04

## 2025-05-13 RX ORDER — FAMOTIDINE 10 MG/ML
20 INJECTION, SOLUTION INTRAVENOUS
OUTPATIENT
Start: 2025-06-04

## 2025-05-13 RX ORDER — SODIUM CHLORIDE 0.9 % (FLUSH) 0.9 %
5-40 SYRINGE (ML) INJECTION PRN
OUTPATIENT
Start: 2025-06-04

## 2025-05-13 RX ORDER — PROCHLORPERAZINE EDISYLATE 5 MG/ML
5 INJECTION INTRAMUSCULAR; INTRAVENOUS
OUTPATIENT
Start: 2025-06-04

## 2025-05-13 RX ORDER — DIPHENHYDRAMINE HYDROCHLORIDE 50 MG/ML
50 INJECTION, SOLUTION INTRAMUSCULAR; INTRAVENOUS ONCE
Status: COMPLETED | OUTPATIENT
Start: 2025-05-13 | End: 2025-05-13

## 2025-05-13 RX ORDER — PREDNISONE 50 MG/1
TABLET ORAL
COMMUNITY
Start: 2025-04-22

## 2025-05-13 RX ORDER — PALONOSETRON 0.05 MG/ML
0.25 INJECTION, SOLUTION INTRAVENOUS ONCE
Status: COMPLETED | OUTPATIENT
Start: 2025-05-13 | End: 2025-05-13

## 2025-05-13 RX ORDER — DOXORUBICIN HYDROCHLORIDE 2 MG/ML
50 INJECTION, SOLUTION INTRAVENOUS ONCE
Status: COMPLETED | OUTPATIENT
Start: 2025-05-13 | End: 2025-05-13

## 2025-05-13 RX ORDER — MEPERIDINE HYDROCHLORIDE 50 MG/ML
12.5 INJECTION INTRAMUSCULAR; INTRAVENOUS; SUBCUTANEOUS PRN
OUTPATIENT
Start: 2025-06-04

## 2025-05-13 RX ORDER — ACETAMINOPHEN 325 MG/1
650 TABLET ORAL
OUTPATIENT
Start: 2025-06-04

## 2025-05-13 RX ORDER — SODIUM CHLORIDE 0.9 % (FLUSH) 0.9 %
5-40 SYRINGE (ML) INJECTION PRN
Status: DISCONTINUED | OUTPATIENT
Start: 2025-05-13 | End: 2025-05-14 | Stop reason: HOSPADM

## 2025-05-13 RX ORDER — PALONOSETRON 0.05 MG/ML
0.25 INJECTION, SOLUTION INTRAVENOUS ONCE
OUTPATIENT
Start: 2025-06-04 | End: 2025-06-04

## 2025-05-13 RX ADMIN — DOXORUBICIN HYDROCHLORIDE 104 MG: 2 INJECTION, SOLUTION INTRAVENOUS at 13:47

## 2025-05-13 RX ADMIN — DIPHENHYDRAMINE HYDROCHLORIDE 50 MG: 50 INJECTION INTRAMUSCULAR; INTRAVENOUS at 10:37

## 2025-05-13 RX ADMIN — VINCRISTINE SULFATE 2 MG: 1 INJECTION, SOLUTION INTRAVENOUS at 14:07

## 2025-05-13 RX ADMIN — CYCLOPHOSPHAMIDE 1560 MG: 500 INJECTION, POWDER, FOR SOLUTION INTRAVENOUS; ORAL at 13:10

## 2025-05-13 RX ADMIN — SODIUM CHLORIDE 30 ML/HR: 0.9 INJECTION, SOLUTION INTRAVENOUS at 10:15

## 2025-05-13 RX ADMIN — PALONOSETRON 0.25 MG: 0.05 INJECTION, SOLUTION INTRAVENOUS at 10:38

## 2025-05-13 RX ADMIN — ACETAMINOPHEN 650 MG: 325 TABLET ORAL at 10:38

## 2025-05-13 RX ADMIN — SODIUM CHLORIDE 150 MG: 0.9 INJECTION, SOLUTION INTRAVENOUS at 10:43

## 2025-05-13 RX ADMIN — SODIUM CHLORIDE, PRESERVATIVE FREE 10 ML: 5 INJECTION INTRAVENOUS at 14:34

## 2025-05-13 RX ADMIN — SODIUM CHLORIDE 800 MG: 0.9 INJECTION, SOLUTION INTRAVENOUS at 11:35

## 2025-05-13 RX ADMIN — HEPARIN 500 UNITS: 100 SYRINGE at 14:34

## 2025-05-13 ASSESSMENT — PAIN SCALES - GENERAL: PAINLEVEL_OUTOF10: 0

## 2025-05-13 NOTE — PROGRESS NOTES
Patient arrived for [facility] infusion.  VSS as charted.  Port accessed without complication.  Lab draw off of Port access.  Patient tolerated infusion well.  Patient left infusion center with all belongings and steady gate. Patient seen doctor today between labs and start of treatment.  New appts set for patient.

## 2025-05-13 NOTE — TELEPHONE ENCOUNTER
Name: Therese Mckeon  : 1960  MRN: [id number]    Oncology Navigation Follow-Up Note    Contact Type:   infusion room    Notes:   Writer met with patient while in infusion room for treatment today, C4D1. She states her stomach has been feeling better since last treatment. It still bothers her some but has improved. She denies any navigation needs.   Encouraged her to call if she has any questions/concerns. Understanding verbalized.   Navigator following for continuity of care.        Electronically signed by Yelena Galvan RN on 2025 at 12:16 PM

## 2025-05-13 NOTE — PROGRESS NOTES
mild infiltrate.     Impression:  Diffuse large B-cell lymphoma, germinal center type, NOS  -Molecular testing could not be performed due to decalcified specimen  Tobacco dependence  Back pain  Medical comorbidities: Dyslipidemia hypertension hypothyroidism    Plan:  I had a detailed discussion with the patient and personally went over results of lab work-up imaging studies and other relevant clinical data.  Toxicity check performed.  Patient status post cycle 3  CT PET report is not very clear.  Will ask radiologist to assess and add Deauville score.  On my personal review patient has a small pleural effusion and indeterminate hilar node this could be related to patient's recent respiratory tract infection  SUV for the L4 lesion reported to be 3.16.  Previous CT PET report showed SUV of 9.  Patient given refill on gabapentin  Nausea improved with Compazine  Continue prednisone day 1 through 5  Return to clinic with next cycle  Patient's conditions  were taken into consideration when deciding risk-benefit for therapy.  Patient is at high risk for drug interaction risk of complications.  Given multiple comorbid conditions patient is at high risk for complication from intervention, risk of hospitalization, medical interaction overall life expectancy.  NCCN guidelines were reviewed and discussed with the patient.  The diagnosis and care plan were discussed with the patient in detail. I discussed the natural history of the disease, prognosis, risks and goals of therapy and answered all the patients questions to the best of my ability.  Patient expressed understanding and was in agreement.    MITUL RICE MD    This note is created with the assistance of a speech recognition program.  While intending to generate a document that actually reflects the content of the visit, the document can still have some errors including those of syntax and sound a like substitutions which may escape proof reading.  It such instances,

## 2025-05-14 ENCOUNTER — TELEMEDICINE (OUTPATIENT)
Dept: NEUROSURGERY | Age: 65
End: 2025-05-14
Payer: MEDICAID

## 2025-05-14 ENCOUNTER — HOSPITAL ENCOUNTER (OUTPATIENT)
Dept: INFUSION THERAPY | Age: 65
Discharge: HOME | End: 2025-05-14
Payer: MEDICAID

## 2025-05-14 VITALS — DIASTOLIC BLOOD PRESSURE: 69 MMHG | OXYGEN SATURATION: 92 % | SYSTOLIC BLOOD PRESSURE: 168 MMHG | HEART RATE: 95 BPM

## 2025-05-14 DIAGNOSIS — C83.398 DIFFUSE LARGE B-CELL LYMPHOMA OF SOLID ORGAN EXCLUDING SPLEEN (HCC): Primary | ICD-10-CM

## 2025-05-14 DIAGNOSIS — M48.062 LUMBAR STENOSIS WITH NEUROGENIC CLAUDICATION: ICD-10-CM

## 2025-05-14 DIAGNOSIS — C80.1 MALIGNANT NEOPLASM METASTATIC TO LUMBAR SPINE WITH UNKNOWN PRIMARY SITE (HCC): Primary | ICD-10-CM

## 2025-05-14 DIAGNOSIS — C79.51 MALIGNANT NEOPLASM METASTATIC TO LUMBAR SPINE WITH UNKNOWN PRIMARY SITE (HCC): Primary | ICD-10-CM

## 2025-05-14 PROCEDURE — 98001 SYNCH AUDIO-VIDEO NEW LOW 30: CPT | Performed by: NEUROLOGICAL SURGERY

## 2025-05-14 PROCEDURE — 96372 THER/PROPH/DIAG INJ SC/IM: CPT

## 2025-05-14 PROCEDURE — 6360000002 HC RX W HCPCS: Performed by: INTERNAL MEDICINE

## 2025-05-14 RX ADMIN — PEGFILGRASTIM-JMDB 6 MG: 6 INJECTION SUBCUTANEOUS at 15:22

## 2025-05-14 NOTE — PROGRESS NOTES
Patient arrived for Fulphila injection.  VSS as charted.  Patient tolerated well.  Patient left infusion center with all belongings and steady gate.  Next appt already set.

## 2025-05-14 NOTE — PROGRESS NOTES
2025    TELEHEALTH EVALUATION -- Audio/Visual    HPI:    Therese Mckeon (:  1960) has requested an audio/video evaluation for the following concern(s):    Controlled back pain at 1 at most and not on any medication. No LE numbness tingling weakness. Just finished 4th cycle of chemo with Dr Mirza    No saddle numbness. No incontinence of bowel or bladder, no retention of bowels or bladder.     No recent falls. Does ambulate with a walker intermittently due to fatigue mostly,.   Review of Systems    Prior to Visit Medications    Medication Sig Taking? Authorizing Provider   predniSONE (DELTASONE) 50 MG tablet TAKE 1 TABLET BY MOUTH TWICE DAILY FROM DAY 1 THROUGH DAY 5 OF EACH CYCLE  ProviderAmaury MD   gabapentin (NEURONTIN) 100 MG capsule Take 200 mg po in am and 100 mg po in pm  Lm Mirza MD   albuterol sulfate HFA (VENTOLIN HFA) 108 (90 Base) MCG/ACT inhaler Inhale 2 puffs into the lungs every 4 hours as needed for Wheezing or Shortness of Breath  Tennille Durand PA-C   Spacer/Aero-Holding Chambers WES 1 Device by Does not apply route daily as needed (for albuterol inhaler)  Tennille Durand PA-C   omeprazole (PRILOSEC) 40 MG delayed release capsule Take 1 capsule by mouth every morning (before breakfast)  Mary Schneider DO   gabapentin (NEURONTIN) 100 MG capsule Take 2 capsules by mouth 2 times daily for 30 days.  Lm Mirza MD   prochlorperazine (COMPAZINE) 10 MG tablet Take 0.5 tablets by mouth every 6 hours as needed (Take 1 tablet every 6 hours for nausea)  Lm Mirza MD   ondansetron (ZOFRAN-ODT) 4 MG disintegrating tablet Take 1 tablet by mouth every 8 hours as needed for Nausea or Vomiting  ProviderAmaury MD   levothyroxine (SYNTHROID) 200 MCG tablet Take 1 tab by mouth daily, except 1/2 tab on .  Mary Schneider DO   Handicap Placard MISC by Does not apply route Issue parking placard for person with disability; Applicant meets the qualifying disability  Detail Level: Detailed

## 2025-05-23 DIAGNOSIS — R49.0 HOARSENESS: ICD-10-CM

## 2025-05-23 NOTE — TELEPHONE ENCOUNTER
Per pt msg: cough seems a little better, would like to stay on another month.    Therese called requesting a refill of the below medication which has been pended for you:     Requested Prescriptions     Pending Prescriptions Disp Refills    omeprazole (PRILOSEC) 40 MG delayed release capsule 30 capsule 0     Sig: Take 1 capsule by mouth every morning (before breakfast)       Last Appointment Date: 3/31/2025  Next Appointment Date: 10/6/2025    Allergies   Allergen Reactions    Amoxicillin Er Itching     Makes blood pressure drop

## 2025-05-25 RX ORDER — OMEPRAZOLE 40 MG/1
40 CAPSULE, DELAYED RELEASE ORAL
Qty: 30 CAPSULE | Refills: 1 | Status: SHIPPED | OUTPATIENT
Start: 2025-05-25

## 2025-06-03 ENCOUNTER — OFFICE VISIT (OUTPATIENT)
Dept: ONCOLOGY | Age: 65
End: 2025-06-03
Payer: MEDICAID

## 2025-06-03 ENCOUNTER — TELEPHONE (OUTPATIENT)
Dept: ONCOLOGY | Age: 65
End: 2025-06-03

## 2025-06-03 ENCOUNTER — HOSPITAL ENCOUNTER (OUTPATIENT)
Dept: INFUSION THERAPY | Age: 65
Discharge: HOME | End: 2025-06-03
Payer: MEDICAID

## 2025-06-03 VITALS
HEIGHT: 64 IN | DIASTOLIC BLOOD PRESSURE: 65 MMHG | HEART RATE: 83 BPM | SYSTOLIC BLOOD PRESSURE: 101 MMHG | BODY MASS INDEX: 34.08 KG/M2 | WEIGHT: 199.6 LBS | RESPIRATION RATE: 16 BRPM | TEMPERATURE: 98.5 F | OXYGEN SATURATION: 94 %

## 2025-06-03 VITALS
HEIGHT: 64 IN | HEART RATE: 83 BPM | DIASTOLIC BLOOD PRESSURE: 65 MMHG | WEIGHT: 199.6 LBS | BODY MASS INDEX: 34.08 KG/M2 | OXYGEN SATURATION: 94 % | TEMPERATURE: 98.5 F | SYSTOLIC BLOOD PRESSURE: 101 MMHG | RESPIRATION RATE: 16 BRPM

## 2025-06-03 DIAGNOSIS — C83.398 DIFFUSE LARGE B-CELL LYMPHOMA OF SOLID ORGAN EXCLUDING SPLEEN (HCC): Primary | ICD-10-CM

## 2025-06-03 DIAGNOSIS — C83.398 DIFFUSE LARGE B-CELL LYMPHOMA OF EXTRANODAL SITE EXCLUDING SPLEEN AND OTHER SOLID ORGANS (HCC): ICD-10-CM

## 2025-06-03 DIAGNOSIS — Z51.11 CHEMOTHERAPY MANAGEMENT, ENCOUNTER FOR: ICD-10-CM

## 2025-06-03 DIAGNOSIS — M54.50 BILATERAL LOW BACK PAIN, UNSPECIFIED CHRONICITY, UNSPECIFIED WHETHER SCIATICA PRESENT: ICD-10-CM

## 2025-06-03 LAB
ALBUMIN SERPL-MCNC: 4.1 G/DL (ref 3.5–5.2)
ALBUMIN/GLOB SERPL: 1.6 {RATIO} (ref 1–2.5)
ALP SERPL-CCNC: 79 U/L (ref 35–104)
ALT SERPL-CCNC: 16 U/L (ref 10–35)
ANION GAP SERPL CALCULATED.3IONS-SCNC: 11 MMOL/L (ref 9–16)
AST SERPL-CCNC: 19 U/L (ref 10–35)
BASOPHILS # BLD: 0.03 K/UL (ref 0–0.2)
BASOPHILS NFR BLD: 1 % (ref 0–2)
BILIRUB SERPL-MCNC: 0.2 MG/DL (ref 0–1.2)
BUN SERPL-MCNC: 13 MG/DL (ref 8–23)
BUN/CREAT SERPL: 26 (ref 9–20)
CALCIUM SERPL-MCNC: 9.5 MG/DL (ref 8.6–10.4)
CHLORIDE SERPL-SCNC: 103 MMOL/L (ref 98–107)
CO2 SERPL-SCNC: 28 MMOL/L (ref 20–31)
CREAT SERPL-MCNC: 0.5 MG/DL (ref 0.6–0.9)
EOSINOPHIL # BLD: 0.06 K/UL (ref 0–0.44)
EOSINOPHILS RELATIVE PERCENT: 1 % (ref 1–4)
ERYTHROCYTE [DISTWIDTH] IN BLOOD BY AUTOMATED COUNT: 16.1 % (ref 11.8–14.4)
GFR, ESTIMATED: >90 ML/MIN/1.73M2
GLUCOSE SERPL-MCNC: 117 MG/DL (ref 74–99)
HCT VFR BLD AUTO: 32.3 % (ref 36.3–47.1)
HGB BLD-MCNC: 10 G/DL (ref 11.9–15.1)
IMM GRANULOCYTES # BLD AUTO: <0.03 K/UL (ref 0–0.3)
IMM GRANULOCYTES NFR BLD: 0 %
LYMPHOCYTES NFR BLD: 0.96 K/UL (ref 1.1–3.7)
LYMPHOCYTES RELATIVE PERCENT: 21 % (ref 24–43)
MCH RBC QN AUTO: 29.4 PG (ref 25.2–33.5)
MCHC RBC AUTO-ENTMCNC: 31 G/DL (ref 25.2–33.5)
MCV RBC AUTO: 95 FL (ref 82.6–102.9)
MONOCYTES NFR BLD: 0.7 K/UL (ref 0.1–1.2)
MONOCYTES NFR BLD: 15 % (ref 3–12)
NEUTROPHILS NFR BLD: 62 % (ref 36–65)
NEUTS SEG NFR BLD: 2.89 K/UL (ref 1.5–8.1)
NRBC BLD-RTO: 0 PER 100 WBC
PLATELET # BLD AUTO: 245 K/UL (ref 138–453)
PMV BLD AUTO: 8.9 FL (ref 8.1–13.5)
POTASSIUM SERPL-SCNC: 4 MMOL/L (ref 3.7–5.3)
PROT SERPL-MCNC: 6.7 G/DL (ref 6.6–8.7)
RBC # BLD AUTO: 3.4 M/UL (ref 3.95–5.11)
RBC # BLD: ABNORMAL 10*6/UL
SODIUM SERPL-SCNC: 142 MMOL/L (ref 136–145)
WBC OTHER # BLD: 4.7 K/UL (ref 3.5–11.3)

## 2025-06-03 PROCEDURE — 6370000000 HC RX 637 (ALT 250 FOR IP): Performed by: INTERNAL MEDICINE

## 2025-06-03 PROCEDURE — 96375 TX/PRO/DX INJ NEW DRUG ADDON: CPT

## 2025-06-03 PROCEDURE — 96367 TX/PROPH/DG ADDL SEQ IV INF: CPT

## 2025-06-03 PROCEDURE — 96411 CHEMO IV PUSH ADDL DRUG: CPT

## 2025-06-03 PROCEDURE — 80053 COMPREHEN METABOLIC PANEL: CPT

## 2025-06-03 PROCEDURE — 3074F SYST BP LT 130 MM HG: CPT | Performed by: INTERNAL MEDICINE

## 2025-06-03 PROCEDURE — 2580000003 HC RX 258: Performed by: INTERNAL MEDICINE

## 2025-06-03 PROCEDURE — 2500000003 HC RX 250 WO HCPCS: Performed by: INTERNAL MEDICINE

## 2025-06-03 PROCEDURE — 99215 OFFICE O/P EST HI 40 MIN: CPT | Performed by: INTERNAL MEDICINE

## 2025-06-03 PROCEDURE — 96413 CHEMO IV INFUSION 1 HR: CPT

## 2025-06-03 PROCEDURE — 3078F DIAST BP <80 MM HG: CPT | Performed by: INTERNAL MEDICINE

## 2025-06-03 PROCEDURE — 6360000002 HC RX W HCPCS: Performed by: INTERNAL MEDICINE

## 2025-06-03 PROCEDURE — 85025 COMPLETE CBC W/AUTO DIFF WBC: CPT

## 2025-06-03 PROCEDURE — 96415 CHEMO IV INFUSION ADDL HR: CPT

## 2025-06-03 PROCEDURE — 99213 OFFICE O/P EST LOW 20 MIN: CPT | Performed by: INTERNAL MEDICINE

## 2025-06-03 PROCEDURE — 96417 CHEMO IV INFUS EACH ADDL SEQ: CPT

## 2025-06-03 RX ORDER — DOXORUBICIN HYDROCHLORIDE 2 MG/ML
50 INJECTION, SOLUTION INTRAVENOUS ONCE
Status: CANCELLED | OUTPATIENT
Start: 2025-06-03 | End: 2025-06-03

## 2025-06-03 RX ORDER — ACETAMINOPHEN 325 MG/1
650 TABLET ORAL ONCE
Status: COMPLETED | OUTPATIENT
Start: 2025-06-03 | End: 2025-06-03

## 2025-06-03 RX ORDER — HYDROCODONE BITARTRATE AND ACETAMINOPHEN 5; 325 MG/1; MG/1
1 TABLET ORAL EVERY 8 HOURS PRN
Qty: 45 TABLET | Refills: 0 | Status: SHIPPED | OUTPATIENT
Start: 2025-06-03 | End: 2025-06-18

## 2025-06-03 RX ORDER — SODIUM CHLORIDE 0.9 % (FLUSH) 0.9 %
5-40 SYRINGE (ML) INJECTION PRN
Status: DISCONTINUED | OUTPATIENT
Start: 2025-06-03 | End: 2025-06-04 | Stop reason: HOSPADM

## 2025-06-03 RX ORDER — HEPARIN 100 UNIT/ML
500 SYRINGE INTRAVENOUS PRN
Status: DISCONTINUED | OUTPATIENT
Start: 2025-06-03 | End: 2025-06-04 | Stop reason: HOSPADM

## 2025-06-03 RX ORDER — SODIUM CHLORIDE 9 MG/ML
5-250 INJECTION, SOLUTION INTRAVENOUS PRN
Status: DISCONTINUED | OUTPATIENT
Start: 2025-06-03 | End: 2025-06-04 | Stop reason: HOSPADM

## 2025-06-03 RX ORDER — DOXORUBICIN HYDROCHLORIDE 2 MG/ML
50 INJECTION, SOLUTION INTRAVENOUS ONCE
Status: COMPLETED | OUTPATIENT
Start: 2025-06-03 | End: 2025-06-03

## 2025-06-03 RX ORDER — PALONOSETRON 0.05 MG/ML
0.25 INJECTION, SOLUTION INTRAVENOUS ONCE
Status: COMPLETED | OUTPATIENT
Start: 2025-06-03 | End: 2025-06-03

## 2025-06-03 RX ORDER — DIPHENHYDRAMINE HYDROCHLORIDE 50 MG/ML
50 INJECTION, SOLUTION INTRAMUSCULAR; INTRAVENOUS ONCE
Status: COMPLETED | OUTPATIENT
Start: 2025-06-03 | End: 2025-06-03

## 2025-06-03 RX ADMIN — SODIUM CHLORIDE, PRESERVATIVE FREE 10 ML: 5 INJECTION INTRAVENOUS at 13:48

## 2025-06-03 RX ADMIN — VINCRISTINE SULFATE 2 MG: 1 INJECTION, SOLUTION INTRAVENOUS at 13:34

## 2025-06-03 RX ADMIN — ACETAMINOPHEN 650 MG: 325 TABLET ORAL at 10:26

## 2025-06-03 RX ADMIN — DOXORUBICIN HYDROCHLORIDE 102 MG: 2 INJECTION, SOLUTION INTRAVENOUS at 13:22

## 2025-06-03 RX ADMIN — SODIUM CHLORIDE 150 MG: 0.9 INJECTION, SOLUTION INTRAVENOUS at 10:32

## 2025-06-03 RX ADMIN — SODIUM CHLORIDE 25 ML/HR: 0.9 INJECTION, SOLUTION INTRAVENOUS at 09:19

## 2025-06-03 RX ADMIN — DIPHENHYDRAMINE HYDROCHLORIDE 50 MG: 50 INJECTION INTRAMUSCULAR; INTRAVENOUS at 10:26

## 2025-06-03 RX ADMIN — HEPARIN 500 UNITS: 100 SYRINGE at 13:48

## 2025-06-03 RX ADMIN — SODIUM CHLORIDE 800 MG: 0.9 INJECTION, SOLUTION INTRAVENOUS at 11:05

## 2025-06-03 RX ADMIN — PALONOSETRON 0.25 MG: 0.05 INJECTION, SOLUTION INTRAVENOUS at 10:26

## 2025-06-03 RX ADMIN — CYCLOPHOSPHAMIDE 1540 MG: 500 INJECTION, POWDER, FOR SOLUTION INTRAVENOUS; ORAL at 12:43

## 2025-06-03 NOTE — PROGRESS NOTES
Chemo completed and no sign of reaction at this time. Pt ambulated out infusion center without difficulty in stable condition. Paperwork given regarding date and time of next treatment.

## 2025-06-03 NOTE — TELEPHONE ENCOUNTER
Name: Therese Mckeon  : 1960  MRN: [id number]    Oncology Navigation Follow-Up Note    Contact Type:  Medical Oncology    Notes:   Met with patient after seeing Dr. Mirza today. She states she is doing well with treatment. She only has one more cycle to complete. She denies any navigation needs at this time. Encouraged her to call with questions/concerns. Understanding verbalized.      Electronically signed by Yelena Galvan RN on 6/3/2025 at 3:07 PM

## 2025-06-03 NOTE — FLOWSHEET NOTE
Spiritual Health History and Assessment/Progress Note  [facility]    Spiritual/Emotional Needs,  ,  ,      Name: Therese Mckeon MRN: [id number]    Age: 65 y.o.     Sex: female   Language: English   Rastafari: Non-Scientologist   <principal problem not specified>     Date: 6/3/2025            Total Time Calculated: 24 min              Spiritual Assessment began in [facility]  MED ONC        Referral/Consult From: Rounding   Encounter Overview/Reason: Spiritual/Emotional Needs  Service Provided For: Patient, Significant other    Patient was receiving her 5th of 6 treatments and was accompanied by her boyfriend (Manuel).  Patient reported not feeling well during the mid-way point of treatment but was feeling better recently and excited to be finished.  Patient and boyfriend initiated an intriguing conversation about nature, God and Jew, with expressed interest in continuing it during her next visit.     Kathy, Belief, Meaning:   Patient has beliefs or practices that help with coping during difficult times  Family/Friends have beliefs or practices that help with coping during difficult times      Importance and Influence:  Patient has spiritual/personal beliefs that influence decisions regarding their health  Family/Friends have spiritual/personal beliefs that influence decisions regarding the patient's health    Community:  Patient feels well-supported. Support system includes: Spouse/Partner and Children  Family/Friends feel well-supported. Support system includes: Spouse/Partner    Assessment and Plan of Care:     Patient Interventions include: Facilitated expression of thoughts and feelings, Engaged in theological reflection, and Affirmed coping skills/support systems  Family/Friends Interventions include: Facilitated expression of thoughts and feelings, Explored spiritual coping/struggle/distress, and Affirmed coping skills/support systems    Patient Plan of Care: Spiritual Care available upon  further referral  Family/Friends Plan of Care: Spiritual Care available upon further referral      Electronically signed by Chaplain Sina on 6/3/2025 at 4:01 PM

## 2025-06-03 NOTE — PROGRESS NOTES
Therese Mckeon                                                                                                                  6/3/2025  MRN:   [id number]  YOB: 1960  PCP:                           Mary Schneider DO  Referring Physician: No ref. provider found  Treating Physician Name: MITUL RICE MD      Reason for visit:  Chief Complaint   Patient presents with    Lymphoma     3wk f/u    Toxicity check.  Discussed treatment plan.    Current problems:  Diffuse large B-cell lymphoma, germinal center type, NOS  -Molecular testing could not be performed due to decalcified specimen  Tobacco dependence  Back pain  Medical comorbidities: Dyslipidemia hypertension hypothyroidism    Active and recent treatments:  R-CHOP    Interval history:  Patient presents to the clinic for a follow-up visit and to discuss results of her lab workup another in clinical data.  Back pain controlled.  Still has some chest congestion.  Voice improved with Nexium.  Scheduled for cycle 5 today.  Denies hospitalization ER visit.  Overall tolerating treatment with manageable side effects.    During this visit patient's allergy, social, medical, surgical history and medications were reviewed and updated.    Summary of Case/History:  Therese Mckeon a 65 y.o.female is a patient with lytic lesions of the spine concerning for metastatic disease presents to the clinic to establish care and for further workup and evaluation.  Patient was initially referred to neurosurgery because of significant back pain.  Patient subsequently had MRI of the lumbar spine which showed lytic process at L4 with soft tissue component extending into the spinal canal.  Patient has been started on Decadron.  Her back pain has improved.  Patient yesterday had a CT chest and pelvis.  No obvious finding suggestive of a primary tumor noted on the CT chest and pelvis.  MRI cervical and thoracic spine show addition bony lesion concerning for

## 2025-06-04 ENCOUNTER — HOSPITAL ENCOUNTER (OUTPATIENT)
Dept: INFUSION THERAPY | Age: 65
Discharge: HOME | End: 2025-06-04
Payer: MEDICAID

## 2025-06-04 VITALS
OXYGEN SATURATION: 95 % | TEMPERATURE: 97.2 F | RESPIRATION RATE: 16 BRPM | SYSTOLIC BLOOD PRESSURE: 146 MMHG | DIASTOLIC BLOOD PRESSURE: 67 MMHG | HEART RATE: 89 BPM

## 2025-06-04 DIAGNOSIS — C83.398 DIFFUSE LARGE B-CELL LYMPHOMA OF SOLID ORGAN EXCLUDING SPLEEN (HCC): Primary | ICD-10-CM

## 2025-06-04 PROCEDURE — 6360000002 HC RX W HCPCS: Performed by: INTERNAL MEDICINE

## 2025-06-04 PROCEDURE — 96372 THER/PROPH/DIAG INJ SC/IM: CPT

## 2025-06-04 RX ADMIN — PEGFILGRASTIM-JMDB 6 MG: 6 INJECTION SUBCUTANEOUS at 15:04

## 2025-06-24 ENCOUNTER — OFFICE VISIT (OUTPATIENT)
Dept: ONCOLOGY | Age: 65
End: 2025-06-24
Payer: MEDICAID

## 2025-06-24 ENCOUNTER — HOSPITAL ENCOUNTER (OUTPATIENT)
Dept: INFUSION THERAPY | Age: 65
Discharge: HOME | End: 2025-06-24
Payer: MEDICAID

## 2025-06-24 VITALS
HEIGHT: 64 IN | HEART RATE: 89 BPM | RESPIRATION RATE: 16 BRPM | WEIGHT: 196.4 LBS | DIASTOLIC BLOOD PRESSURE: 64 MMHG | BODY MASS INDEX: 33.53 KG/M2 | OXYGEN SATURATION: 93 % | TEMPERATURE: 97.4 F | SYSTOLIC BLOOD PRESSURE: 132 MMHG

## 2025-06-24 VITALS
RESPIRATION RATE: 16 BRPM | SYSTOLIC BLOOD PRESSURE: 132 MMHG | BODY MASS INDEX: 33.57 KG/M2 | DIASTOLIC BLOOD PRESSURE: 64 MMHG | HEIGHT: 64 IN | TEMPERATURE: 97.4 F | WEIGHT: 196.6 LBS

## 2025-06-24 DIAGNOSIS — C83.398 DIFFUSE LARGE B-CELL LYMPHOMA OF EXTRANODAL SITE EXCLUDING SPLEEN AND OTHER SOLID ORGANS (HCC): ICD-10-CM

## 2025-06-24 DIAGNOSIS — C83.398 DIFFUSE LARGE B-CELL LYMPHOMA OF SOLID ORGAN EXCLUDING SPLEEN (HCC): Primary | ICD-10-CM

## 2025-06-24 DIAGNOSIS — Z51.11 CHEMOTHERAPY MANAGEMENT, ENCOUNTER FOR: ICD-10-CM

## 2025-06-24 DIAGNOSIS — M54.50 BILATERAL LOW BACK PAIN, UNSPECIFIED CHRONICITY, UNSPECIFIED WHETHER SCIATICA PRESENT: ICD-10-CM

## 2025-06-24 LAB
ALBUMIN SERPL-MCNC: 4.1 G/DL (ref 3.5–5.2)
ALBUMIN/GLOB SERPL: 1.5 {RATIO} (ref 1–2.5)
ALP SERPL-CCNC: 77 U/L (ref 35–104)
ALT SERPL-CCNC: 15 U/L (ref 10–35)
ANION GAP SERPL CALCULATED.3IONS-SCNC: 15 MMOL/L (ref 9–16)
AST SERPL-CCNC: 18 U/L (ref 10–35)
BASOPHILS # BLD: 0.03 K/UL (ref 0–0.2)
BASOPHILS NFR BLD: 1 % (ref 0–2)
BILIRUB SERPL-MCNC: 0.2 MG/DL (ref 0–1.2)
BUN SERPL-MCNC: 12 MG/DL (ref 8–23)
BUN/CREAT SERPL: 24 (ref 9–20)
CALCIUM SERPL-MCNC: 9.7 MG/DL (ref 8.6–10.4)
CHLORIDE SERPL-SCNC: 102 MMOL/L (ref 98–107)
CO2 SERPL-SCNC: 24 MMOL/L (ref 20–31)
CREAT SERPL-MCNC: 0.5 MG/DL (ref 0.6–0.9)
EOSINOPHIL # BLD: 0.04 K/UL (ref 0–0.44)
EOSINOPHILS RELATIVE PERCENT: 1 % (ref 1–4)
ERYTHROCYTE [DISTWIDTH] IN BLOOD BY AUTOMATED COUNT: 16.2 % (ref 11.8–14.4)
GFR, ESTIMATED: >90 ML/MIN/1.73M2
GLUCOSE SERPL-MCNC: 116 MG/DL (ref 74–99)
HCT VFR BLD AUTO: 32.7 % (ref 36.3–47.1)
HGB BLD-MCNC: 10.2 G/DL (ref 11.9–15.1)
IMM GRANULOCYTES # BLD AUTO: 0.03 K/UL (ref 0–0.3)
IMM GRANULOCYTES NFR BLD: 1 %
LYMPHOCYTES NFR BLD: 1.08 K/UL (ref 1.1–3.7)
LYMPHOCYTES RELATIVE PERCENT: 22 % (ref 24–43)
MCH RBC QN AUTO: 29 PG (ref 25.2–33.5)
MCHC RBC AUTO-ENTMCNC: 31.2 G/DL (ref 25.2–33.5)
MCV RBC AUTO: 92.9 FL (ref 82.6–102.9)
MONOCYTES NFR BLD: 0.89 K/UL (ref 0.1–1.2)
MONOCYTES NFR BLD: 18 % (ref 3–12)
NEUTROPHILS NFR BLD: 57 % (ref 36–65)
NEUTS SEG NFR BLD: 2.93 K/UL (ref 1.5–8.1)
NRBC BLD-RTO: 0 PER 100 WBC
PLATELET # BLD AUTO: 252 K/UL (ref 138–453)
PMV BLD AUTO: 9 FL (ref 8.1–13.5)
POTASSIUM SERPL-SCNC: 4 MMOL/L (ref 3.7–5.3)
PROT SERPL-MCNC: 6.8 G/DL (ref 6.6–8.7)
RBC # BLD AUTO: 3.52 M/UL (ref 3.95–5.11)
RBC # BLD: ABNORMAL 10*6/UL
SODIUM SERPL-SCNC: 141 MMOL/L (ref 136–145)
WBC OTHER # BLD: 5 K/UL (ref 3.5–11.3)

## 2025-06-24 PROCEDURE — 99214 OFFICE O/P EST MOD 30 MIN: CPT | Performed by: INTERNAL MEDICINE

## 2025-06-24 PROCEDURE — 6370000000 HC RX 637 (ALT 250 FOR IP): Performed by: INTERNAL MEDICINE

## 2025-06-24 PROCEDURE — 6360000002 HC RX W HCPCS: Performed by: INTERNAL MEDICINE

## 2025-06-24 PROCEDURE — 96413 CHEMO IV INFUSION 1 HR: CPT

## 2025-06-24 PROCEDURE — 80053 COMPREHEN METABOLIC PANEL: CPT

## 2025-06-24 PROCEDURE — 96415 CHEMO IV INFUSION ADDL HR: CPT

## 2025-06-24 PROCEDURE — 96375 TX/PRO/DX INJ NEW DRUG ADDON: CPT

## 2025-06-24 PROCEDURE — 2580000003 HC RX 258: Performed by: INTERNAL MEDICINE

## 2025-06-24 PROCEDURE — 3078F DIAST BP <80 MM HG: CPT | Performed by: INTERNAL MEDICINE

## 2025-06-24 PROCEDURE — 99215 OFFICE O/P EST HI 40 MIN: CPT | Performed by: INTERNAL MEDICINE

## 2025-06-24 PROCEDURE — 96367 TX/PROPH/DG ADDL SEQ IV INF: CPT

## 2025-06-24 PROCEDURE — 85025 COMPLETE CBC W/AUTO DIFF WBC: CPT

## 2025-06-24 PROCEDURE — 3075F SYST BP GE 130 - 139MM HG: CPT | Performed by: INTERNAL MEDICINE

## 2025-06-24 PROCEDURE — 96411 CHEMO IV PUSH ADDL DRUG: CPT

## 2025-06-24 PROCEDURE — 2500000003 HC RX 250 WO HCPCS: Performed by: INTERNAL MEDICINE

## 2025-06-24 PROCEDURE — 96417 CHEMO IV INFUS EACH ADDL SEQ: CPT

## 2025-06-24 RX ORDER — ONDANSETRON 2 MG/ML
8 INJECTION INTRAMUSCULAR; INTRAVENOUS
Status: CANCELLED | OUTPATIENT
Start: 2025-06-25

## 2025-06-24 RX ORDER — MEPERIDINE HYDROCHLORIDE 50 MG/ML
12.5 INJECTION INTRAMUSCULAR; INTRAVENOUS; SUBCUTANEOUS PRN
Status: CANCELLED | OUTPATIENT
Start: 2025-06-25

## 2025-06-24 RX ORDER — PROCHLORPERAZINE EDISYLATE 5 MG/ML
5 INJECTION INTRAMUSCULAR; INTRAVENOUS
Status: CANCELLED | OUTPATIENT
Start: 2025-06-25

## 2025-06-24 RX ORDER — FAMOTIDINE 10 MG/ML
20 INJECTION, SOLUTION INTRAVENOUS
Status: CANCELLED | OUTPATIENT
Start: 2025-06-25

## 2025-06-24 RX ORDER — SODIUM CHLORIDE 9 MG/ML
5-250 INJECTION, SOLUTION INTRAVENOUS PRN
Status: CANCELLED | OUTPATIENT
Start: 2025-06-25

## 2025-06-24 RX ORDER — HEPARIN SODIUM (PORCINE) LOCK FLUSH IV SOLN 100 UNIT/ML 100 UNIT/ML
500 SOLUTION INTRAVENOUS PRN
Status: CANCELLED | OUTPATIENT
Start: 2025-06-25

## 2025-06-24 RX ORDER — ALBUTEROL SULFATE 90 UG/1
4 INHALANT RESPIRATORY (INHALATION) PRN
Status: CANCELLED | OUTPATIENT
Start: 2025-06-25

## 2025-06-24 RX ORDER — DOXORUBICIN HYDROCHLORIDE 2 MG/ML
50 INJECTION, SOLUTION INTRAVENOUS ONCE
Status: COMPLETED | OUTPATIENT
Start: 2025-06-24 | End: 2025-06-24

## 2025-06-24 RX ORDER — SODIUM CHLORIDE 9 MG/ML
INJECTION, SOLUTION INTRAVENOUS CONTINUOUS
Status: CANCELLED | OUTPATIENT
Start: 2025-06-25

## 2025-06-24 RX ORDER — HEPARIN 100 UNIT/ML
500 SYRINGE INTRAVENOUS PRN
Status: DISCONTINUED | OUTPATIENT
Start: 2025-06-24 | End: 2025-06-25 | Stop reason: HOSPADM

## 2025-06-24 RX ORDER — PALONOSETRON 0.05 MG/ML
0.25 INJECTION, SOLUTION INTRAVENOUS ONCE
Status: COMPLETED | OUTPATIENT
Start: 2025-06-24 | End: 2025-06-24

## 2025-06-24 RX ORDER — SODIUM CHLORIDE 0.9 % (FLUSH) 0.9 %
5-40 SYRINGE (ML) INJECTION PRN
Status: CANCELLED | OUTPATIENT
Start: 2025-06-25

## 2025-06-24 RX ORDER — SODIUM CHLORIDE 9 MG/ML
5-250 INJECTION, SOLUTION INTRAVENOUS PRN
Status: DISCONTINUED | OUTPATIENT
Start: 2025-06-24 | End: 2025-06-25 | Stop reason: HOSPADM

## 2025-06-24 RX ORDER — SODIUM CHLORIDE 0.9 % (FLUSH) 0.9 %
5-40 SYRINGE (ML) INJECTION PRN
Status: DISCONTINUED | OUTPATIENT
Start: 2025-06-24 | End: 2025-06-25 | Stop reason: HOSPADM

## 2025-06-24 RX ORDER — PREDNISONE 50 MG/1
50 TABLET ORAL 2 TIMES DAILY
Qty: 3 TABLET | Refills: 0 | Status: SHIPPED | OUTPATIENT
Start: 2025-06-24 | End: 2025-06-26

## 2025-06-24 RX ORDER — ACETAMINOPHEN 325 MG/1
650 TABLET ORAL
Status: CANCELLED | OUTPATIENT
Start: 2025-06-25

## 2025-06-24 RX ORDER — DOXORUBICIN HYDROCHLORIDE 2 MG/ML
50 INJECTION, SOLUTION INTRAVENOUS ONCE
Status: CANCELLED | OUTPATIENT
Start: 2025-06-25 | End: 2025-06-25

## 2025-06-24 RX ORDER — DIPHENHYDRAMINE HYDROCHLORIDE 50 MG/ML
50 INJECTION, SOLUTION INTRAMUSCULAR; INTRAVENOUS ONCE
Status: CANCELLED | OUTPATIENT
Start: 2025-06-25 | End: 2025-06-25

## 2025-06-24 RX ORDER — DIPHENHYDRAMINE HYDROCHLORIDE 50 MG/ML
50 INJECTION, SOLUTION INTRAMUSCULAR; INTRAVENOUS ONCE
Status: COMPLETED | OUTPATIENT
Start: 2025-06-24 | End: 2025-06-24

## 2025-06-24 RX ORDER — DIPHENHYDRAMINE HYDROCHLORIDE 50 MG/ML
50 INJECTION, SOLUTION INTRAMUSCULAR; INTRAVENOUS
Status: CANCELLED | OUTPATIENT
Start: 2025-06-25

## 2025-06-24 RX ORDER — ACETAMINOPHEN 325 MG/1
650 TABLET ORAL ONCE
Status: COMPLETED | OUTPATIENT
Start: 2025-06-24 | End: 2025-06-24

## 2025-06-24 RX ORDER — PALONOSETRON 0.05 MG/ML
0.25 INJECTION, SOLUTION INTRAVENOUS ONCE
Status: CANCELLED | OUTPATIENT
Start: 2025-06-25 | End: 2025-06-25

## 2025-06-24 RX ORDER — EPINEPHRINE 1 MG/ML
0.3 INJECTION, SOLUTION, CONCENTRATE INTRAVENOUS PRN
Status: CANCELLED | OUTPATIENT
Start: 2025-06-25

## 2025-06-24 RX ORDER — HYDROCORTISONE SODIUM SUCCINATE 100 MG/2ML
100 INJECTION INTRAMUSCULAR; INTRAVENOUS
Status: CANCELLED | OUTPATIENT
Start: 2025-06-25

## 2025-06-24 RX ORDER — ACETAMINOPHEN 325 MG/1
650 TABLET ORAL ONCE
Status: CANCELLED | OUTPATIENT
Start: 2025-06-25 | End: 2025-06-25

## 2025-06-24 RX ADMIN — HEPARIN 500 UNITS: 100 SYRINGE at 14:06

## 2025-06-24 RX ADMIN — VINCRISTINE SULFATE 2 MG: 1 INJECTION, SOLUTION INTRAVENOUS at 13:40

## 2025-06-24 RX ADMIN — DOXORUBICIN HYDROCHLORIDE 102 MG: 2 INJECTION, SOLUTION INTRAVENOUS at 13:27

## 2025-06-24 RX ADMIN — ACETAMINOPHEN 650 MG: 325 TABLET ORAL at 10:29

## 2025-06-24 RX ADMIN — PALONOSETRON 0.25 MG: 0.05 INJECTION, SOLUTION INTRAVENOUS at 10:30

## 2025-06-24 RX ADMIN — SODIUM CHLORIDE, PRESERVATIVE FREE 10 ML: 5 INJECTION INTRAVENOUS at 14:06

## 2025-06-24 RX ADMIN — CYCLOPHOSPHAMIDE 1500 MG: 500 INJECTION, POWDER, FOR SOLUTION INTRAVENOUS; ORAL at 12:48

## 2025-06-24 RX ADMIN — SODIUM CHLORIDE 800 MG: 0.9 INJECTION, SOLUTION INTRAVENOUS at 11:08

## 2025-06-24 RX ADMIN — DIPHENHYDRAMINE HYDROCHLORIDE 50 MG: 50 INJECTION INTRAMUSCULAR; INTRAVENOUS at 10:30

## 2025-06-24 RX ADMIN — SODIUM CHLORIDE 40 ML/HR: 0.9 INJECTION, SOLUTION INTRAVENOUS at 10:19

## 2025-06-24 RX ADMIN — SODIUM CHLORIDE 150 MG: 0.9 INJECTION, SOLUTION INTRAVENOUS at 10:38

## 2025-06-24 ASSESSMENT — PAIN - FUNCTIONAL ASSESSMENT: PAIN_FUNCTIONAL_ASSESSMENT: ACTIVITIES ARE NOT PREVENTED

## 2025-06-24 ASSESSMENT — PAIN SCALES - GENERAL: PAINLEVEL_OUTOF10: 0

## 2025-06-24 NOTE — PROGRESS NOTES
VAD de-accessed after 10ml NS and 5ml of heparin.  Band aid applied to site.  No sign of reaction to infusion at this time.  Pt ambulated out infusion center door without difficulty.  DIXIEPt going to Dr. Mirza's office for appt. Pt will be back tomorrow for fulphila injection and scheduled for port flush in 6 weeks.

## 2025-06-24 NOTE — PROGRESS NOTES
Therese Mckeon                                                                                                                  6/24/2025  MRN:   [id number]  YOB: 1960  PCP:                           Mary Schneider DO  Referring Physician: No ref. provider found  Treating Physician Name: MITUL RICE MD      Reason for visit:  Chief Complaint   Patient presents with    Follow-up     Lymphoma    Toxicity check  Discussed treatment plan    Current problems:  Diffuse large B-cell lymphoma, germinal center type, NOS  -Molecular testing could not be performed due to decalcified specimen  Tobacco dependence  Back pain  Medical comorbidities: Dyslipidemia hypertension hypothyroidism    Active and recent treatments:  R-CHOP x 6 cycles-completed 6/2025    Interval history:  Patient presents to the clinic for a follow-up visit and to discuss results of her lab workup.  Patient scheduled for cycle 6.  Back pain improved.  Hoarseness of voice is improving over time but still complains of cough.  Has an appointment coming up with neurosurgery and will have a MRI spine done before that.  Labs adequate for treatment denies fever or chills.  Denies hospitalization ER visit.    During this visit patient's allergy, social, medical, surgical history and medications were reviewed and updated.    Summary of Case/History:  Therese Mckeon a 65 y.o.female is a patient with lytic lesions of the spine concerning for metastatic disease presents to the clinic to establish care and for further workup and evaluation.  Patient was initially referred to neurosurgery because of significant back pain.  Patient subsequently had MRI of the lumbar spine which showed lytic process at L4 with soft tissue component extending into the spinal canal.  Patient has been started on Decadron.  Her back pain has improved.  Patient yesterday had a CT chest and pelvis.  No obvious finding suggestive of a primary tumor noted on the CT

## 2025-06-25 ENCOUNTER — HOSPITAL ENCOUNTER (OUTPATIENT)
Dept: INFUSION THERAPY | Age: 65
Discharge: HOME | End: 2025-06-25
Payer: MEDICAID

## 2025-06-25 VITALS
OXYGEN SATURATION: 97 % | RESPIRATION RATE: 16 BRPM | HEART RATE: 100 BPM | TEMPERATURE: 98.9 F | SYSTOLIC BLOOD PRESSURE: 128 MMHG | DIASTOLIC BLOOD PRESSURE: 68 MMHG

## 2025-06-25 DIAGNOSIS — C83.398 DIFFUSE LARGE B-CELL LYMPHOMA OF SOLID ORGAN EXCLUDING SPLEEN (HCC): Primary | ICD-10-CM

## 2025-06-25 PROCEDURE — 6360000002 HC RX W HCPCS: Performed by: INTERNAL MEDICINE

## 2025-06-25 PROCEDURE — 96372 THER/PROPH/DIAG INJ SC/IM: CPT

## 2025-06-25 RX ADMIN — PEGFILGRASTIM-JMDB 6 MG: 6 INJECTION SUBCUTANEOUS at 15:09

## 2025-06-25 NOTE — PROGRESS NOTES
Patient came to the infusion center for her injection. Patient tolerated well. Ambulated out of the department with her rolator and her significant other.

## 2025-07-02 ENCOUNTER — HOSPITAL ENCOUNTER (OUTPATIENT)
Dept: INFUSION THERAPY | Age: 65
Discharge: HOME | End: 2025-07-02
Payer: MEDICAID

## 2025-07-02 VITALS
SYSTOLIC BLOOD PRESSURE: 110 MMHG | TEMPERATURE: 97.5 F | RESPIRATION RATE: 16 BRPM | HEART RATE: 98 BPM | OXYGEN SATURATION: 92 % | DIASTOLIC BLOOD PRESSURE: 46 MMHG

## 2025-07-02 DIAGNOSIS — C83.398 DIFFUSE LARGE B-CELL LYMPHOMA OF EXTRANODAL SITE EXCLUDING SPLEEN AND OTHER SOLID ORGANS (HCC): Primary | ICD-10-CM

## 2025-07-02 PROCEDURE — 96361 HYDRATE IV INFUSION ADD-ON: CPT

## 2025-07-02 PROCEDURE — 2500000003 HC RX 250 WO HCPCS: Performed by: INTERNAL MEDICINE

## 2025-07-02 PROCEDURE — 2580000003 HC RX 258: Performed by: INTERNAL MEDICINE

## 2025-07-02 PROCEDURE — 96360 HYDRATION IV INFUSION INIT: CPT

## 2025-07-02 PROCEDURE — 6360000002 HC RX W HCPCS: Performed by: INTERNAL MEDICINE

## 2025-07-02 RX ORDER — SODIUM CHLORIDE 0.9 % (FLUSH) 0.9 %
5-40 SYRINGE (ML) INJECTION PRN
Status: DISCONTINUED | OUTPATIENT
Start: 2025-07-02 | End: 2025-07-03 | Stop reason: HOSPADM

## 2025-07-02 RX ORDER — 0.9 % SODIUM CHLORIDE 0.9 %
1000 INTRAVENOUS SOLUTION INTRAVENOUS PRN
Start: 2025-07-02

## 2025-07-02 RX ORDER — HEPARIN 100 UNIT/ML
500 SYRINGE INTRAVENOUS PRN
OUTPATIENT
Start: 2025-07-02

## 2025-07-02 RX ORDER — SODIUM CHLORIDE 9 MG/ML
5-250 INJECTION, SOLUTION INTRAVENOUS PRN
OUTPATIENT
Start: 2025-07-02

## 2025-07-02 RX ORDER — SODIUM CHLORIDE 0.9 % (FLUSH) 0.9 %
5-40 SYRINGE (ML) INJECTION PRN
OUTPATIENT
Start: 2025-07-02

## 2025-07-02 RX ORDER — 0.9 % SODIUM CHLORIDE 0.9 %
1000 INTRAVENOUS SOLUTION INTRAVENOUS PRN
Status: DISCONTINUED | OUTPATIENT
Start: 2025-07-02 | End: 2025-07-03 | Stop reason: HOSPADM

## 2025-07-02 RX ORDER — HEPARIN 100 UNIT/ML
500 SYRINGE INTRAVENOUS PRN
Status: DISCONTINUED | OUTPATIENT
Start: 2025-07-02 | End: 2025-07-03 | Stop reason: HOSPADM

## 2025-07-02 RX ADMIN — SODIUM CHLORIDE, PRESERVATIVE FREE 10 ML: 5 INJECTION INTRAVENOUS at 12:15

## 2025-07-02 RX ADMIN — HEPARIN 500 UNITS: 100 SYRINGE at 12:15

## 2025-07-02 RX ADMIN — SODIUM CHLORIDE 1000 ML: 0.9 INJECTION, SOLUTION INTRAVENOUS at 10:15

## 2025-07-02 NOTE — PROGRESS NOTES
Pt called in and states she is having difficulty keeping fluids down. Brought pt in for a liter of IV fluids. VAD de-accessed after 10ml NS and 5ml of heparin.  Band aid applied to site.  No sign of reaction to infusion at this time.  Pt ambulated out infusion center door without difficulty in stable condition.

## 2025-07-07 ENCOUNTER — TELEPHONE (OUTPATIENT)
Dept: ONCOLOGY | Age: 65
End: 2025-07-07

## 2025-07-07 NOTE — TELEPHONE ENCOUNTER
Name: Therese Mckeon  : 1960  MRN: [id number]    Oncology Navigation Follow-Up Note    Contact Type:  chart review    Notes:   Patient has completed treatment. She is scheduled for the followin25 PET CT [facility]  7/15/25 MRI lumbar spine w wo contrast [facility]HD  25 Dr. Mirza follow up      Electronically signed by Yelena Galvan RN on 2025 at 3:33 PM

## 2025-07-14 ENCOUNTER — HOSPITAL ENCOUNTER (OUTPATIENT)
Dept: NUCLEAR MEDICINE | Age: 65
Discharge: HOME | End: 2025-07-16
Attending: INTERNAL MEDICINE
Payer: MEDICAID

## 2025-07-14 DIAGNOSIS — M54.50 BILATERAL LOW BACK PAIN, UNSPECIFIED CHRONICITY, UNSPECIFIED WHETHER SCIATICA PRESENT: ICD-10-CM

## 2025-07-14 DIAGNOSIS — Z51.11 CHEMOTHERAPY MANAGEMENT, ENCOUNTER FOR: ICD-10-CM

## 2025-07-14 DIAGNOSIS — C83.398 DIFFUSE LARGE B-CELL LYMPHOMA OF SOLID ORGAN EXCLUDING SPLEEN (HCC): ICD-10-CM

## 2025-07-14 LAB — GLUCOSE BLD-MCNC: 106 MG/DL (ref 65–105)

## 2025-07-14 PROCEDURE — A9552 F18 FDG: HCPCS | Performed by: INTERNAL MEDICINE

## 2025-07-14 PROCEDURE — 2500000003 HC RX 250 WO HCPCS: Performed by: INTERNAL MEDICINE

## 2025-07-14 PROCEDURE — 3430000000 HC RX DIAGNOSTIC RADIOPHARMACEUTICAL: Performed by: INTERNAL MEDICINE

## 2025-07-14 PROCEDURE — A9609 HC RX DIAGNOSTIC RADIOPHARMACEUTICAL: HCPCS | Performed by: INTERNAL MEDICINE

## 2025-07-14 PROCEDURE — 78815 PET IMAGE W/CT SKULL-THIGH: CPT

## 2025-07-14 PROCEDURE — 82947 ASSAY GLUCOSE BLOOD QUANT: CPT

## 2025-07-14 RX ORDER — FLUDEOXYGLUCOSE F 18 200 MCI/ML
10 INJECTION, SOLUTION INTRAVENOUS
Status: COMPLETED | OUTPATIENT
Start: 2025-07-14 | End: 2025-07-14

## 2025-07-14 RX ORDER — SODIUM CHLORIDE 0.9 % (FLUSH) 0.9 %
10 SYRINGE (ML) INJECTION PRN
Status: DISCONTINUED | OUTPATIENT
Start: 2025-07-14 | End: 2025-07-17 | Stop reason: HOSPADM

## 2025-07-14 RX ADMIN — SODIUM CHLORIDE, PRESERVATIVE FREE 10 ML: 5 INJECTION INTRAVENOUS at 08:10

## 2025-07-14 RX ADMIN — FLUDEOXYGLUCOSE F 18 10.63 MILLICURIE: 200 INJECTION, SOLUTION INTRAVENOUS at 08:10

## 2025-07-15 ENCOUNTER — HOSPITAL ENCOUNTER (OUTPATIENT)
Dept: MRI IMAGING | Age: 65
Discharge: HOME | End: 2025-07-17
Payer: MEDICAID

## 2025-07-15 DIAGNOSIS — C79.51 MALIGNANT NEOPLASM METASTATIC TO LUMBAR SPINE WITH UNKNOWN PRIMARY SITE (HCC): ICD-10-CM

## 2025-07-15 DIAGNOSIS — C80.1 MALIGNANT NEOPLASM METASTATIC TO LUMBAR SPINE WITH UNKNOWN PRIMARY SITE (HCC): ICD-10-CM

## 2025-07-15 DIAGNOSIS — M48.062 LUMBAR STENOSIS WITH NEUROGENIC CLAUDICATION: ICD-10-CM

## 2025-07-15 PROCEDURE — 6360000004 HC RX CONTRAST MEDICATION: Performed by: NEUROLOGICAL SURGERY

## 2025-07-15 PROCEDURE — A9579 GAD-BASE MR CONTRAST NOS,1ML: HCPCS | Performed by: NEUROLOGICAL SURGERY

## 2025-07-15 PROCEDURE — 72158 MRI LUMBAR SPINE W/O & W/DYE: CPT

## 2025-07-15 RX ORDER — GADOTERIDOL 279.3 MG/ML
15 INJECTION INTRAVENOUS
Status: COMPLETED | OUTPATIENT
Start: 2025-07-15 | End: 2025-07-15

## 2025-07-15 RX ADMIN — GADOTERIDOL 15 ML: 279.3 INJECTION, SOLUTION INTRAVENOUS at 14:19

## 2025-07-16 ENCOUNTER — HOSPITAL ENCOUNTER (OUTPATIENT)
Dept: GENERAL RADIOLOGY | Age: 65
Discharge: HOME | End: 2025-07-18
Payer: MEDICAID

## 2025-07-16 DIAGNOSIS — M48.062 LUMBAR STENOSIS WITH NEUROGENIC CLAUDICATION: ICD-10-CM

## 2025-07-16 DIAGNOSIS — C79.51 MALIGNANT NEOPLASM METASTATIC TO LUMBAR SPINE WITH UNKNOWN PRIMARY SITE (HCC): ICD-10-CM

## 2025-07-16 DIAGNOSIS — C80.1 MALIGNANT NEOPLASM METASTATIC TO LUMBAR SPINE WITH UNKNOWN PRIMARY SITE (HCC): ICD-10-CM

## 2025-07-16 PROCEDURE — 72100 X-RAY EXAM L-S SPINE 2/3 VWS: CPT

## 2025-07-18 ENCOUNTER — OFFICE VISIT (OUTPATIENT)
Dept: NEUROSURGERY | Age: 65
End: 2025-07-18
Payer: MEDICAID

## 2025-07-18 VITALS
RESPIRATION RATE: 20 BRPM | HEART RATE: 94 BPM | BODY MASS INDEX: 32.95 KG/M2 | WEIGHT: 193 LBS | SYSTOLIC BLOOD PRESSURE: 102 MMHG | HEIGHT: 64 IN | OXYGEN SATURATION: 93 % | DIASTOLIC BLOOD PRESSURE: 62 MMHG

## 2025-07-18 DIAGNOSIS — C80.1 MALIGNANT NEOPLASM METASTATIC TO LUMBAR SPINE WITH UNKNOWN PRIMARY SITE (HCC): Primary | ICD-10-CM

## 2025-07-18 DIAGNOSIS — C79.51 MALIGNANT NEOPLASM METASTATIC TO LUMBAR SPINE WITH UNKNOWN PRIMARY SITE (HCC): Primary | ICD-10-CM

## 2025-07-18 PROCEDURE — 3078F DIAST BP <80 MM HG: CPT | Performed by: NEUROLOGICAL SURGERY

## 2025-07-18 PROCEDURE — 3074F SYST BP LT 130 MM HG: CPT | Performed by: NEUROLOGICAL SURGERY

## 2025-07-18 PROCEDURE — 99213 OFFICE O/P EST LOW 20 MIN: CPT | Performed by: NEUROLOGICAL SURGERY

## 2025-07-18 PROCEDURE — 99214 OFFICE O/P EST MOD 30 MIN: CPT | Performed by: NEUROLOGICAL SURGERY

## 2025-07-18 NOTE — PROGRESS NOTES
band    DILATION AND CURETTAGE OF UTERUS  1995    following miscarriage    PARTIAL HYSTERECTOMY (CERVIX NOT REMOVED)  09/25/2020    [facility] - Dr. Kimball; uterus & both ovaries/fallopian tubes removed    PORT SURGERY Right 02/19/2025    Right Port insertion performed by Momo Urias MD at [facility] OR    THYROIDECTOMY  1980    due to abscess    UTERUS SURGERY  09/25/2020    Robot-assisted laparoscopic sacrocolpopexy with polypropylene Y-shaped mesh of the [place] Poken type (REF [id number], LOT [id number])     Family History   Problem Relation Age of Onset    Liver Disease Mother         was felt to be related to early gastic bypass surgery    Hypertension Mother     Cancer Father         Esophageal    Diabetes Father     Heart Attack Father         age 59    Breast Cancer Sister         around age 60    Heart Attack Brother         early 60's    Heart Attack Maternal Grandmother         due to blood clot - age 61    No Known Problems Maternal Aunt     No Known Problems Maternal Aunt     No Known Problems Paternal Aunt     No Known Problems Paternal Aunt     No Known Problems Paternal Aunt     Glaucoma Neg Hx      Current Outpatient Medications on File Prior to Visit   Medication Sig Dispense Refill    omeprazole (PRILOSEC) 40 MG delayed release capsule Take 1 capsule by mouth every morning (before breakfast) 30 capsule 1    albuterol sulfate HFA (VENTOLIN HFA) 108 (90 Base) MCG/ACT inhaler Inhale 2 puffs into the lungs every 4 hours as needed for Wheezing or Shortness of Breath 18 g 0    gabapentin (NEURONTIN) 100 MG capsule Take 2 capsules by mouth 2 times daily for 30 days. 60 capsule 1    prochlorperazine (COMPAZINE) 10 MG tablet Take 0.5 tablets by mouth every 6 hours as needed (Take 1 tablet every 6 hours for nausea) 80 tablet 0    levothyroxine (SYNTHROID) 200 MCG tablet Take 1 tab by mouth daily, except 1/2 tab on Sunday's. 90 tablet 0    escitalopram (LEXAPRO) 10 MG tablet Take 1 tablet by

## 2025-07-22 ENCOUNTER — OFFICE VISIT (OUTPATIENT)
Dept: ONCOLOGY | Age: 65
End: 2025-07-22
Payer: MEDICAID

## 2025-07-22 ENCOUNTER — TELEPHONE (OUTPATIENT)
Dept: ONCOLOGY | Age: 65
End: 2025-07-22

## 2025-07-22 VITALS
OXYGEN SATURATION: 91 % | TEMPERATURE: 98.1 F | BODY MASS INDEX: 33.85 KG/M2 | HEART RATE: 95 BPM | DIASTOLIC BLOOD PRESSURE: 64 MMHG | WEIGHT: 197.2 LBS | SYSTOLIC BLOOD PRESSURE: 102 MMHG

## 2025-07-22 DIAGNOSIS — T45.1X5A CHEMOTHERAPY-INDUCED NEUROPATHY: ICD-10-CM

## 2025-07-22 DIAGNOSIS — C83.398 DIFFUSE LARGE B-CELL LYMPHOMA OF SOLID ORGAN EXCLUDING SPLEEN (HCC): Primary | ICD-10-CM

## 2025-07-22 DIAGNOSIS — G62.0 CHEMOTHERAPY-INDUCED NEUROPATHY: ICD-10-CM

## 2025-07-22 DIAGNOSIS — T45.1X5A CHEMOTHERAPY-INDUCED NAUSEA: ICD-10-CM

## 2025-07-22 DIAGNOSIS — M54.50 BILATERAL LOW BACK PAIN, UNSPECIFIED CHRONICITY, UNSPECIFIED WHETHER SCIATICA PRESENT: ICD-10-CM

## 2025-07-22 DIAGNOSIS — R11.0 CHEMOTHERAPY-INDUCED NAUSEA: ICD-10-CM

## 2025-07-22 PROCEDURE — 99214 OFFICE O/P EST MOD 30 MIN: CPT | Performed by: INTERNAL MEDICINE

## 2025-07-22 PROCEDURE — 3078F DIAST BP <80 MM HG: CPT | Performed by: INTERNAL MEDICINE

## 2025-07-22 PROCEDURE — 3074F SYST BP LT 130 MM HG: CPT | Performed by: INTERNAL MEDICINE

## 2025-07-22 NOTE — PROGRESS NOTES
Therese Mckeon                                                                                                                  7/22/2025  MRN:   [id number]  YOB: 1960  PCP:                           Mary Schneider DO  Referring Physician: No ref. provider found  Treating Physician Name: MITUL RICE MD      Reason for visit:  Chief Complaint   Patient presents with    Lymphoma      Lymphoma post PET/CT      Results   Patient presents to the clinic for toxicity check and to discuss results of lab work-up, imaging studies and further treatment plan.    Current problems:  Diffuse large B-cell lymphoma, germinal center type, NOS  -Molecular testing could not be performed due to decalcified specimen  Tobacco dependence  Back pain  Medical comorbidities: Dyslipidemia hypertension hypothyroidism    Active and recent treatments:  R-CHOP x 6 cycles-completed 6/2025    Interval history:  Patient presents to the clinic for a follow-up visit and to discuss results of her lab workup.  Underwent CT PET which showed CR.  Patient back pain much improved.  Neuropathy improving.  Patient wants to know if she can wean herself off gabapentin.  During this visit patient's allergy, social, medical, surgical history and medications were reviewed and updated.    Summary of Case/History:  Therese Mckeon a 65 y.o.female is a patient with lytic lesions of the spine concerning for metastatic disease presents to the clinic to establish care and for further workup and evaluation.  Patient was initially referred to neurosurgery because of significant back pain.  Patient subsequently had MRI of the lumbar spine which showed lytic process at L4 with soft tissue component extending into the spinal canal.  Patient has been started on Decadron.  Her back pain has improved.  Patient yesterday had a CT chest and pelvis.  No obvious finding suggestive of a primary tumor noted on the CT chest and pelvis.  MRI cervical and

## 2025-07-22 NOTE — TELEPHONE ENCOUNTER
Name: Therese Mckeon  : 1960  MRN: [id number]    Oncology Navigation Follow-Up Note    Contact Type:  Medical Oncology    Notes:   Met with patient and friend while in office for med onc visit. She is now on surveillance with three month follow up scheduled for 10/21/25 with labs prior.     Instructed about Survivorship visit and brochure given with writer's contact information. She can call to schedule if she is interested.      She has reached the point in her cancer journey where navigator's help is not as greatly needed. Navigator will continue to be available if she needs assistance, however will no longer be actively following her care. Patient verbalized understanding and agreement.  Care team updated.    Electronically signed by Yelena Galvan RN on 2025 at 2:41 PM

## 2025-07-31 ENCOUNTER — HOSPITAL ENCOUNTER (OUTPATIENT)
Dept: INFUSION THERAPY | Age: 65
Discharge: HOME | End: 2025-07-31
Payer: MEDICAID

## 2025-07-31 DIAGNOSIS — C83.30 DIFFUSE LARGE B-CELL LYMPHOMA, UNSPECIFIED BODY REGION (HCC): Primary | ICD-10-CM

## 2025-07-31 PROCEDURE — 2500000003 HC RX 250 WO HCPCS: Performed by: INTERNAL MEDICINE

## 2025-07-31 PROCEDURE — 6360000002 HC RX W HCPCS: Performed by: INTERNAL MEDICINE

## 2025-07-31 PROCEDURE — 96523 IRRIG DRUG DELIVERY DEVICE: CPT

## 2025-07-31 RX ORDER — HEPARIN 100 UNIT/ML
500 SYRINGE INTRAVENOUS PRN
OUTPATIENT
Start: 2025-07-31

## 2025-07-31 RX ORDER — HEPARIN 100 UNIT/ML
500 SYRINGE INTRAVENOUS PRN
Status: DISCONTINUED | OUTPATIENT
Start: 2025-07-31 | End: 2025-08-01 | Stop reason: HOSPADM

## 2025-07-31 RX ORDER — SODIUM CHLORIDE 9 MG/ML
25 INJECTION, SOLUTION INTRAVENOUS PRN
OUTPATIENT
Start: 2025-07-31

## 2025-07-31 RX ORDER — SODIUM CHLORIDE 0.9 % (FLUSH) 0.9 %
5-40 SYRINGE (ML) INJECTION PRN
OUTPATIENT
Start: 2025-07-31

## 2025-07-31 RX ORDER — SODIUM CHLORIDE 0.9 % (FLUSH) 0.9 %
5-40 SYRINGE (ML) INJECTION PRN
Status: DISCONTINUED | OUTPATIENT
Start: 2025-07-31 | End: 2025-08-01 | Stop reason: HOSPADM

## 2025-07-31 RX ADMIN — SODIUM CHLORIDE, PRESERVATIVE FREE 10 ML: 5 INJECTION INTRAVENOUS at 15:02

## 2025-07-31 RX ADMIN — HEPARIN 500 UNITS: 100 SYRINGE at 15:02

## 2025-08-07 ENCOUNTER — HOSPITAL ENCOUNTER (OUTPATIENT)
Dept: PHYSICAL THERAPY | Age: 65
Setting detail: THERAPIES SERIES
Discharge: HOME | End: 2025-08-07
Attending: INTERNAL MEDICINE
Payer: MEDICAID

## 2025-08-07 PROCEDURE — 97161 PT EVAL LOW COMPLEX 20 MIN: CPT | Performed by: PHYSICAL THERAPIST

## 2025-08-07 PROCEDURE — 97110 THERAPEUTIC EXERCISES: CPT | Performed by: PHYSICAL THERAPIST

## 2025-08-13 ENCOUNTER — HOSPITAL ENCOUNTER (OUTPATIENT)
Dept: PHYSICAL THERAPY | Age: 65
Setting detail: THERAPIES SERIES
Discharge: HOME | End: 2025-08-13
Attending: INTERNAL MEDICINE
Payer: MEDICAID

## 2025-08-13 PROCEDURE — 97110 THERAPEUTIC EXERCISES: CPT

## 2025-08-15 ENCOUNTER — HOSPITAL ENCOUNTER (OUTPATIENT)
Dept: PHYSICAL THERAPY | Age: 65
Setting detail: THERAPIES SERIES
Discharge: HOME | End: 2025-08-15
Attending: INTERNAL MEDICINE
Payer: MEDICAID

## 2025-08-15 PROCEDURE — 97110 THERAPEUTIC EXERCISES: CPT

## 2025-08-18 ENCOUNTER — HOSPITAL ENCOUNTER (OUTPATIENT)
Dept: PHYSICAL THERAPY | Age: 65
Setting detail: THERAPIES SERIES
Discharge: HOME | End: 2025-08-18
Attending: INTERNAL MEDICINE
Payer: MEDICAID

## 2025-08-18 PROCEDURE — 97110 THERAPEUTIC EXERCISES: CPT

## 2025-08-20 ENCOUNTER — HOSPITAL ENCOUNTER (OUTPATIENT)
Dept: PHYSICAL THERAPY | Age: 65
Setting detail: THERAPIES SERIES
Discharge: HOME | End: 2025-08-20
Attending: INTERNAL MEDICINE
Payer: MEDICAID

## 2025-08-20 PROCEDURE — 97110 THERAPEUTIC EXERCISES: CPT

## 2025-08-25 ENCOUNTER — HOSPITAL ENCOUNTER (OUTPATIENT)
Dept: PHYSICAL THERAPY | Age: 65
Setting detail: THERAPIES SERIES
Discharge: HOME | End: 2025-08-25
Attending: INTERNAL MEDICINE
Payer: MEDICAID

## 2025-08-25 PROCEDURE — 97110 THERAPEUTIC EXERCISES: CPT

## 2025-08-28 ENCOUNTER — HOSPITAL ENCOUNTER (OUTPATIENT)
Dept: PHYSICAL THERAPY | Age: 65
Setting detail: THERAPIES SERIES
Discharge: HOME | End: 2025-08-28
Attending: INTERNAL MEDICINE
Payer: MEDICAID

## 2025-08-28 PROCEDURE — 97110 THERAPEUTIC EXERCISES: CPT | Performed by: PHYSICAL THERAPIST

## 2025-09-03 ENCOUNTER — HOSPITAL ENCOUNTER (OUTPATIENT)
Dept: PHYSICAL THERAPY | Age: 65
Setting detail: THERAPIES SERIES
Discharge: HOME | End: 2025-09-03
Attending: INTERNAL MEDICINE
Payer: MEDICAID

## 2025-09-03 PROCEDURE — 97110 THERAPEUTIC EXERCISES: CPT

## (undated) DEVICE — DECANTER FLD 9IN ST BG FOR ASEP TRNSF OF FLD

## (undated) DEVICE — SUTURE VICRYL SZ 3-0 L27IN ABSRB UD L26MM CT-2 1/2 CIR J232H

## (undated) DEVICE — 3M™ TEGADERM™ TRANSPARENT FILM DRESSING FRAME STYLE, 1626W, 4 IN X 4-3/4 IN (10 CM X 12 CM), 50/CT 4CT/CASE: Brand: 3M™ TEGADERM™

## (undated) DEVICE — GLOVE SURG 7.5 11.7IN BEAD CUF LIGHT BRN SENSICARE LTX FREE

## (undated) DEVICE — SUTURE MONOCRYL SZ 4-0 L18IN ABSRB UD L19MM PS-2 3/8 CIR PRIM Y496G

## (undated) DEVICE — GARMENT,MEDLINE,DVT,INT,CALF,MED, GEN2: Brand: MEDLINE

## (undated) DEVICE — GOWN,AURORA,NONRNF,XL,30/CS: Brand: MEDLINE

## (undated) DEVICE — DRAPE C ARM W104XL188CM FLD MOB XR

## (undated) DEVICE — STRIP,CLOSURE,WOUND,MEDI-STRIP,1/2X4: Brand: MEDLINE

## (undated) DEVICE — TUBING, SUCTION, 3/16" X 20', STRAIGHT: Brand: MEDLINE

## (undated) DEVICE — SOLUTION IV 100ML 0.9% SOD CHL PLAS CONT USP VIAFLX 1 PER

## (undated) DEVICE — MASTISOL ADHESIVE LIQ 2/3ML

## (undated) DEVICE — Device

## (undated) DEVICE — MDHZ LAPAROTOMY: Brand: MEDLINE INDUSTRIES, INC.

## (undated) DEVICE — YANKAUER,BULB TIP,W/O VENT,RIGID,STERILE: Brand: MEDLINE

## (undated) DEVICE — GAUZE,SPONGE,2"X2",8PLY,STERILE,LF,2'S: Brand: MEDLINE